# Patient Record
Sex: MALE | Race: WHITE | Employment: FULL TIME | URBAN - METROPOLITAN AREA
[De-identification: names, ages, dates, MRNs, and addresses within clinical notes are randomized per-mention and may not be internally consistent; named-entity substitution may affect disease eponyms.]

---

## 2017-01-06 ENCOUNTER — OFFICE VISIT (OUTPATIENT)
Dept: FAMILY MEDICINE CLINIC | Facility: CLINIC | Age: 52
End: 2017-01-06

## 2017-01-06 VITALS — DIASTOLIC BLOOD PRESSURE: 82 MMHG | RESPIRATION RATE: 16 BRPM | HEART RATE: 80 BPM | SYSTOLIC BLOOD PRESSURE: 132 MMHG

## 2017-01-06 DIAGNOSIS — E88.81 METABOLIC SYNDROME: Primary | ICD-10-CM

## 2017-01-06 DIAGNOSIS — Z72.0 TOBACCO ABUSE: ICD-10-CM

## 2017-01-06 DIAGNOSIS — E78.1 HYPERTRIGLYCERIDEMIA: ICD-10-CM

## 2017-01-06 PROCEDURE — 99213 OFFICE O/P EST LOW 20 MIN: CPT | Performed by: FAMILY MEDICINE

## 2017-01-06 RX ORDER — PRAVASTATIN SODIUM 10 MG
10 TABLET ORAL NIGHTLY
Qty: 90 TABLET | Refills: 3 | Status: SHIPPED | OUTPATIENT
Start: 2017-01-06 | End: 2017-06-07

## 2017-01-06 RX ORDER — FENOFIBRATE 160 MG/1
160 TABLET ORAL DAILY
Qty: 90 TABLET | Refills: 3 | Status: SHIPPED | OUTPATIENT
Start: 2017-01-06 | End: 2017-11-06

## 2017-01-06 RX ORDER — LEVOTHYROXINE SODIUM 0.03 MG/1
TABLET ORAL
Qty: 100 TABLET | Refills: 3 | Status: SHIPPED | OUTPATIENT
Start: 2017-01-06 | End: 2017-05-02 | Stop reason: DRUGHIGH

## 2017-01-06 NOTE — PROGRESS NOTES
Arrives to discuss some findings including a TSH over 10 and a triglyceride over 700. Thankfully he is asymptomatic for both these numbers although he has recently put on a fair amount of weight since he stopped smoking.     He denies chest pain shortness

## 2017-04-03 ENCOUNTER — LAB ENCOUNTER (OUTPATIENT)
Dept: LAB | Age: 52
End: 2017-04-03
Attending: FAMILY MEDICINE
Payer: COMMERCIAL

## 2017-04-03 DIAGNOSIS — E78.1 HYPERTRIGLYCERIDEMIA: ICD-10-CM

## 2017-04-03 DIAGNOSIS — E88.81 METABOLIC SYNDROME: ICD-10-CM

## 2017-04-03 PROCEDURE — 80061 LIPID PANEL: CPT

## 2017-04-03 PROCEDURE — 36415 COLL VENOUS BLD VENIPUNCTURE: CPT

## 2017-04-03 PROCEDURE — 83721 ASSAY OF BLOOD LIPOPROTEIN: CPT

## 2017-04-03 PROCEDURE — 84443 ASSAY THYROID STIM HORMONE: CPT

## 2017-04-03 PROCEDURE — 80053 COMPREHEN METABOLIC PANEL: CPT

## 2017-04-03 PROCEDURE — 83036 HEMOGLOBIN GLYCOSYLATED A1C: CPT

## 2017-04-11 ENCOUNTER — OFFICE VISIT (OUTPATIENT)
Dept: FAMILY MEDICINE CLINIC | Facility: CLINIC | Age: 52
End: 2017-04-11

## 2017-04-11 VITALS
HEART RATE: 66 BPM | BODY MASS INDEX: 35.55 KG/M2 | WEIGHT: 240 LBS | TEMPERATURE: 98 F | SYSTOLIC BLOOD PRESSURE: 144 MMHG | RESPIRATION RATE: 16 BRPM | HEIGHT: 69 IN | DIASTOLIC BLOOD PRESSURE: 82 MMHG

## 2017-04-11 DIAGNOSIS — K75.81 STEATOHEPATITIS: ICD-10-CM

## 2017-04-11 DIAGNOSIS — E78.1 HYPERTRIGLYCERIDEMIA: Primary | ICD-10-CM

## 2017-04-11 PROCEDURE — 99213 OFFICE O/P EST LOW 20 MIN: CPT | Performed by: FAMILY MEDICINE

## 2017-04-11 RX ORDER — PHENTERMINE HYDROCHLORIDE 37.5 MG/1
37.5 CAPSULE ORAL EVERY MORNING
Qty: 30 CAPSULE | Refills: 2 | Status: SHIPPED | OUTPATIENT
Start: 2017-04-11 | End: 2017-06-07

## 2017-04-11 RX ORDER — BUDESONIDE AND FORMOTEROL FUMARATE DIHYDRATE 160; 4.5 UG/1; UG/1
AEROSOL RESPIRATORY (INHALATION)
Qty: 1 INHALER | Refills: 6 | Status: SHIPPED | OUTPATIENT
Start: 2017-04-11 | End: 2017-07-17

## 2017-04-11 RX ORDER — ALBUTEROL SULFATE 90 UG/1
2 AEROSOL, METERED RESPIRATORY (INHALATION) EVERY 6 HOURS PRN
Qty: 1 INHALER | Refills: 6 | Status: SHIPPED | OUTPATIENT
Start: 2017-04-11 | End: 2017-11-06

## 2017-04-11 NOTE — PROGRESS NOTES
Here today to discuss his weight gain that has come on the heels of his losing weight intentionally. .  I also took advantage of our time today to discuss laboratory results including an AST of 48 and an ALT of 83. These represent asymptomatic elevations.

## 2017-05-02 ENCOUNTER — TELEPHONE (OUTPATIENT)
Dept: FAMILY MEDICINE CLINIC | Facility: CLINIC | Age: 52
End: 2017-05-02

## 2017-05-02 RX ORDER — LEVOTHYROXINE SODIUM 0.1 MG/1
100 TABLET ORAL
Qty: 90 TABLET | Refills: 1 | Status: SHIPPED | OUTPATIENT
Start: 2017-05-02 | End: 2017-10-30

## 2017-05-02 NOTE — TELEPHONE ENCOUNTER
Refilled medication- patient was taking Levothyroxine 25mcg 4 tabs a day. rx sent for Levothyroxine 100mcg 1 tab a day. Alejandra informed.

## 2017-05-02 NOTE — TELEPHONE ENCOUNTER
Wants to know why they only got 3 tabs on the Levothyroxine. He refilled at [facility] and this is all they gave him. Normally he gets 30 days supply. He needs a refill on this med for 1 month. Pls call to advise.

## 2017-06-05 ENCOUNTER — APPOINTMENT (OUTPATIENT)
Dept: LAB | Age: 52
End: 2017-06-05
Attending: FAMILY MEDICINE
Payer: COMMERCIAL

## 2017-06-05 DIAGNOSIS — K75.81 STEATOHEPATITIS: ICD-10-CM

## 2017-06-05 DIAGNOSIS — E78.1 HYPERTRIGLYCERIDEMIA: ICD-10-CM

## 2017-06-05 PROCEDURE — 83036 HEMOGLOBIN GLYCOSYLATED A1C: CPT

## 2017-06-05 PROCEDURE — 80061 LIPID PANEL: CPT

## 2017-06-05 PROCEDURE — 80053 COMPREHEN METABOLIC PANEL: CPT

## 2017-06-05 PROCEDURE — 36415 COLL VENOUS BLD VENIPUNCTURE: CPT

## 2017-06-07 ENCOUNTER — OFFICE VISIT (OUTPATIENT)
Dept: FAMILY MEDICINE CLINIC | Facility: CLINIC | Age: 52
End: 2017-06-07

## 2017-06-07 VITALS
SYSTOLIC BLOOD PRESSURE: 132 MMHG | WEIGHT: 228 LBS | TEMPERATURE: 98 F | BODY MASS INDEX: 33.77 KG/M2 | RESPIRATION RATE: 16 BRPM | DIASTOLIC BLOOD PRESSURE: 84 MMHG | HEART RATE: 76 BPM | HEIGHT: 69 IN

## 2017-06-07 DIAGNOSIS — T78.3XXA ANGIOEDEMA, INITIAL ENCOUNTER: ICD-10-CM

## 2017-06-07 DIAGNOSIS — E88.81 METABOLIC SYNDROME: Primary | ICD-10-CM

## 2017-06-07 DIAGNOSIS — E78.1 HYPERTRIGLYCERIDEMIA: ICD-10-CM

## 2017-06-07 PROCEDURE — 99213 OFFICE O/P EST LOW 20 MIN: CPT | Performed by: FAMILY MEDICINE

## 2017-06-07 RX ORDER — PRAVASTATIN SODIUM 20 MG
20 TABLET ORAL NIGHTLY
Qty: 90 TABLET | Refills: 3 | Status: SHIPPED | OUTPATIENT
Start: 2017-06-07 | End: 2017-11-06

## 2017-06-07 RX ORDER — MONTELUKAST SODIUM 10 MG/1
10 TABLET ORAL NIGHTLY
Qty: 90 TABLET | Refills: 3 | Status: SHIPPED | OUTPATIENT
Start: 2017-06-07 | End: 2017-11-06

## 2017-07-17 RX ORDER — BUDESONIDE AND FORMOTEROL FUMARATE DIHYDRATE 160; 4.5 UG/1; UG/1
AEROSOL RESPIRATORY (INHALATION)
Qty: 3 INHALER | Refills: 3 | Status: SHIPPED | OUTPATIENT
Start: 2017-07-17 | End: 2017-11-06

## 2017-10-30 ENCOUNTER — TELEPHONE (OUTPATIENT)
Dept: FAMILY MEDICINE CLINIC | Facility: CLINIC | Age: 52
End: 2017-10-30

## 2017-10-30 RX ORDER — LEVOTHYROXINE SODIUM 0.1 MG/1
100 TABLET ORAL
Qty: 90 TABLET | Refills: 0 | Status: SHIPPED | OUTPATIENT
Start: 2017-10-30 | End: 2017-11-06

## 2017-10-30 NOTE — TELEPHONE ENCOUNTER
rx refilled, pt has appt 11/13/17, however wife states pt's b/p has been high, and wants sooner appt, no symptoms.  Pt will get labs done, appt rescheduled for 11/6/17

## 2017-11-03 ENCOUNTER — LAB ENCOUNTER (OUTPATIENT)
Dept: LAB | Age: 52
End: 2017-11-03
Attending: FAMILY MEDICINE
Payer: COMMERCIAL

## 2017-11-03 DIAGNOSIS — T78.3XXA ANGIOEDEMA, INITIAL ENCOUNTER: ICD-10-CM

## 2017-11-03 DIAGNOSIS — E88.81 METABOLIC SYNDROME: ICD-10-CM

## 2017-11-03 DIAGNOSIS — E78.1 HYPERTRIGLYCERIDEMIA: ICD-10-CM

## 2017-11-03 PROCEDURE — 80061 LIPID PANEL: CPT

## 2017-11-03 PROCEDURE — 80053 COMPREHEN METABOLIC PANEL: CPT

## 2017-11-03 PROCEDURE — 36415 COLL VENOUS BLD VENIPUNCTURE: CPT

## 2017-11-03 PROCEDURE — 83721 ASSAY OF BLOOD LIPOPROTEIN: CPT

## 2017-11-06 ENCOUNTER — OFFICE VISIT (OUTPATIENT)
Dept: FAMILY MEDICINE CLINIC | Facility: CLINIC | Age: 52
End: 2017-11-06

## 2017-11-06 VITALS
OXYGEN SATURATION: 98 % | HEART RATE: 77 BPM | WEIGHT: 228 LBS | DIASTOLIC BLOOD PRESSURE: 90 MMHG | BODY MASS INDEX: 33.77 KG/M2 | SYSTOLIC BLOOD PRESSURE: 160 MMHG | TEMPERATURE: 98 F | HEIGHT: 69 IN | RESPIRATION RATE: 17 BRPM

## 2017-11-06 DIAGNOSIS — E78.1 HYPERTRIGLYCERIDEMIA: ICD-10-CM

## 2017-11-06 DIAGNOSIS — E88.81 METABOLIC SYNDROME: ICD-10-CM

## 2017-11-06 DIAGNOSIS — I10 HYPERTENSION, ACCELERATED: Primary | ICD-10-CM

## 2017-11-06 PROCEDURE — 99214 OFFICE O/P EST MOD 30 MIN: CPT | Performed by: FAMILY MEDICINE

## 2017-11-06 RX ORDER — FENOFIBRATE 160 MG/1
160 TABLET ORAL DAILY
Qty: 90 TABLET | Refills: 3 | Status: SHIPPED | OUTPATIENT
Start: 2017-11-06 | End: 2018-06-26

## 2017-11-06 RX ORDER — LEVOTHYROXINE SODIUM 0.1 MG/1
100 TABLET ORAL
Qty: 90 TABLET | Refills: 0 | Status: SHIPPED | OUTPATIENT
Start: 2017-11-06 | End: 2018-02-24

## 2017-11-06 RX ORDER — BUDESONIDE AND FORMOTEROL FUMARATE DIHYDRATE 160; 4.5 UG/1; UG/1
AEROSOL RESPIRATORY (INHALATION)
Qty: 3 INHALER | Refills: 3 | Status: SHIPPED | OUTPATIENT
Start: 2017-11-06 | End: 2018-06-26

## 2017-11-06 RX ORDER — PRAVASTATIN SODIUM 20 MG
20 TABLET ORAL NIGHTLY
Qty: 90 TABLET | Refills: 3 | Status: SHIPPED | OUTPATIENT
Start: 2017-11-06 | End: 2018-06-26

## 2017-11-06 RX ORDER — ALBUTEROL SULFATE 90 UG/1
2 AEROSOL, METERED RESPIRATORY (INHALATION) EVERY 6 HOURS PRN
Qty: 1 INHALER | Refills: 6 | Status: SHIPPED | OUTPATIENT
Start: 2017-11-06 | End: 2018-06-26

## 2017-11-06 RX ORDER — LOSARTAN POTASSIUM AND HYDROCHLOROTHIAZIDE 12.5; 5 MG/1; MG/1
1 TABLET ORAL DAILY
Qty: 90 TABLET | Refills: 3 | Status: SHIPPED | OUTPATIENT
Start: 2017-11-06 | End: 2018-06-26

## 2017-11-06 NOTE — PROGRESS NOTES
Patient is here for reevaluation of his chronic asthma. He is a very busy toe tropic . He occasionally has upper substernal burning is worse when he is active on the job or otherwise becomes excited about something. He is currently symptom-free.

## 2017-12-04 ENCOUNTER — TELEPHONE (OUTPATIENT)
Dept: FAMILY MEDICINE CLINIC | Facility: CLINIC | Age: 52
End: 2017-12-04

## 2017-12-04 NOTE — TELEPHONE ENCOUNTER
PT WIFE CALLED TO SET UP APPT BUT WOULD LIKE ELIU TO BE SEEN SOONER THAN 12/19/17    PT STATES BP STILL RUNNING HIGH     PLEASE CALL

## 2017-12-05 NOTE — TELEPHONE ENCOUNTER
Spoke to pt, he states his b/p has been 151/? He is not sure, he is taking his meds. He has no symptoms. appt time changed to 12/7/17.

## 2017-12-07 NOTE — PROGRESS NOTES
Follow-up for both blood pressure and his breathing. He had atypical chest sensations which have improved drastically now that he is using his Symbicort inhaler and as needed albuterol inhalers more regularly.   He had been taking montelukast for recurrent

## 2018-01-23 ENCOUNTER — APPOINTMENT (OUTPATIENT)
Dept: GENERAL RADIOLOGY | Facility: HOSPITAL | Age: 53
End: 2018-01-23
Attending: EMERGENCY MEDICINE
Payer: OTHER MISCELLANEOUS

## 2018-01-23 ENCOUNTER — HOSPITAL ENCOUNTER (EMERGENCY)
Facility: HOSPITAL | Age: 53
Discharge: HOME | End: 2018-01-23
Attending: EMERGENCY MEDICINE
Payer: OTHER MISCELLANEOUS

## 2018-01-23 VITALS
TEMPERATURE: 98 F | HEIGHT: 68 IN | SYSTOLIC BLOOD PRESSURE: 138 MMHG | OXYGEN SATURATION: 96 % | WEIGHT: 234 LBS | RESPIRATION RATE: 24 BRPM | HEART RATE: 72 BPM | DIASTOLIC BLOOD PRESSURE: 58 MMHG | BODY MASS INDEX: 35.46 KG/M2

## 2018-01-23 DIAGNOSIS — S93.492A SPRAIN OF ANTERIOR TALOFIBULAR LIGAMENT OF LEFT ANKLE, INITIAL ENCOUNTER: Primary | ICD-10-CM

## 2018-01-23 DIAGNOSIS — S63.502A SPRAIN OF LEFT WRIST, INITIAL ENCOUNTER: ICD-10-CM

## 2018-01-23 PROCEDURE — 73610 X-RAY EXAM OF ANKLE: CPT | Performed by: EMERGENCY MEDICINE

## 2018-01-23 PROCEDURE — 99284 EMERGENCY DEPT VISIT MOD MDM: CPT

## 2018-01-23 PROCEDURE — 73110 X-RAY EXAM OF WRIST: CPT | Performed by: EMERGENCY MEDICINE

## 2018-01-23 RX ORDER — IBUPROFEN 600 MG/1
600 TABLET ORAL EVERY 8 HOURS PRN
Qty: 30 TABLET | Refills: 0 | Status: SHIPPED | OUTPATIENT
Start: 2018-01-23 | End: 2018-01-30

## 2018-01-23 RX ORDER — IBUPROFEN 600 MG/1
600 TABLET ORAL ONCE
Status: DISCONTINUED | OUTPATIENT
Start: 2018-01-23 | End: 2018-01-23

## 2018-01-23 NOTE — ED NOTES
Spoke with Stacey Goodwin in [address] is not contracted with [facility] [address]. 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

## 2018-01-23 NOTE — ED PROVIDER NOTES
Patient Seen in: [facility] Emergency Department    History   Patient presents with:  Fall (musculoskeletal, neurologic)    Stated Complaint: ankle, wrist    HPI    69-year-old male who comes emergency department complaining of a fall.   He norma 1669  ------------------------------------------------------------  Xr Ankle (min 3 Views), Left (cpt=73610)    Result Date: 1/23/2018  CONCLUSION:  1. Soft tissue swelling laterally without definite acute displaced fracture appreciated.   Findings consiste

## 2018-01-24 ENCOUNTER — TELEPHONE (OUTPATIENT)
Dept: FAMILY MEDICINE CLINIC | Facility: CLINIC | Age: 53
End: 2018-01-24

## 2018-01-24 NOTE — TELEPHONE ENCOUNTER
Pt seen in ED on 1/23/18 for a sprained wrist and ankle. He was recommended to follow up in a week with JG if needed. Patient said the swelling is going down and the mobility is coming back. He said for now he didn't feel he needed a follow up.

## 2018-02-26 ENCOUNTER — TELEPHONE (OUTPATIENT)
Dept: FAMILY MEDICINE CLINIC | Facility: CLINIC | Age: 53
End: 2018-02-26

## 2018-02-26 RX ORDER — LEVOTHYROXINE SODIUM 0.1 MG/1
TABLET ORAL
Qty: 90 TABLET | Refills: 0 | Status: SHIPPED | OUTPATIENT
Start: 2018-02-26 | End: 2018-05-06

## 2018-02-26 NOTE — TELEPHONE ENCOUNTER
PT OUT OF LEVOTHYROXINE. WE DID GET A REQUEST. NEEDS REFILLED ASAP. CAN WE REFILL? PLS CALL TO ADVISE.     Kamryn Broussard ON FILE

## 2018-05-06 DIAGNOSIS — E03.9 HYPOTHYROIDISM, UNSPECIFIED TYPE: Primary | ICD-10-CM

## 2018-05-07 RX ORDER — LEVOTHYROXINE SODIUM 0.1 MG/1
TABLET ORAL
Qty: 30 TABLET | Refills: 0 | Status: SHIPPED | OUTPATIENT
Start: 2018-05-07 | End: 2018-06-26

## 2018-06-04 RX ORDER — LEVOTHYROXINE SODIUM 0.1 MG/1
TABLET ORAL
Qty: 90 TABLET | Refills: 0 | Status: SHIPPED | OUTPATIENT
Start: 2018-06-04 | End: 2018-08-28

## 2018-06-11 ENCOUNTER — TELEPHONE (OUTPATIENT)
Dept: FAMILY MEDICINE CLINIC | Facility: CLINIC | Age: 53
End: 2018-06-11

## 2018-06-11 DIAGNOSIS — E78.1 HYPERTRIGLYCERIDEMIA: Primary | ICD-10-CM

## 2018-06-11 DIAGNOSIS — R73.09 ELEVATED GLUCOSE: ICD-10-CM

## 2018-06-11 NOTE — TELEPHONE ENCOUNTER
Pt's wife called to request a call back to discuss pt's lab orders, she wants to know if pt is due for any other labs since pt has a hx of bp and cholesterol and thyroid. Pt's wife wants pt to do his labs prior to his appt next Friday.  Please call and advi

## 2018-06-26 ENCOUNTER — LAB ENCOUNTER (OUTPATIENT)
Dept: LAB | Age: 53
End: 2018-06-26
Attending: FAMILY MEDICINE
Payer: COMMERCIAL

## 2018-06-26 ENCOUNTER — OFFICE VISIT (OUTPATIENT)
Dept: FAMILY MEDICINE CLINIC | Facility: CLINIC | Age: 53
End: 2018-06-26

## 2018-06-26 VITALS
RESPIRATION RATE: 16 BRPM | BODY MASS INDEX: 34.71 KG/M2 | WEIGHT: 229 LBS | OXYGEN SATURATION: 98 % | HEIGHT: 68 IN | TEMPERATURE: 98 F | DIASTOLIC BLOOD PRESSURE: 78 MMHG | HEART RATE: 54 BPM | SYSTOLIC BLOOD PRESSURE: 116 MMHG

## 2018-06-26 DIAGNOSIS — E03.9 HYPOTHYROIDISM, UNSPECIFIED TYPE: ICD-10-CM

## 2018-06-26 DIAGNOSIS — E78.1 HYPERTRIGLYCERIDEMIA: ICD-10-CM

## 2018-06-26 DIAGNOSIS — E88.81 METABOLIC SYNDROME: ICD-10-CM

## 2018-06-26 DIAGNOSIS — J45.41 MODERATE PERSISTENT ASTHMA WITH ACUTE EXACERBATION: Primary | ICD-10-CM

## 2018-06-26 DIAGNOSIS — R73.09 ELEVATED GLUCOSE: ICD-10-CM

## 2018-06-26 PROCEDURE — 83036 HEMOGLOBIN GLYCOSYLATED A1C: CPT

## 2018-06-26 PROCEDURE — 80061 LIPID PANEL: CPT

## 2018-06-26 PROCEDURE — 99213 OFFICE O/P EST LOW 20 MIN: CPT | Performed by: FAMILY MEDICINE

## 2018-06-26 PROCEDURE — 85025 COMPLETE CBC W/AUTO DIFF WBC: CPT

## 2018-06-26 PROCEDURE — 80053 COMPREHEN METABOLIC PANEL: CPT

## 2018-06-26 PROCEDURE — 84443 ASSAY THYROID STIM HORMONE: CPT

## 2018-06-26 PROCEDURE — 36415 COLL VENOUS BLD VENIPUNCTURE: CPT

## 2018-06-26 RX ORDER — LOSARTAN POTASSIUM AND HYDROCHLOROTHIAZIDE 12.5; 5 MG/1; MG/1
1 TABLET ORAL DAILY
Qty: 90 TABLET | Refills: 3 | Status: ON HOLD | OUTPATIENT
Start: 2018-06-26 | End: 2018-09-01

## 2018-06-26 RX ORDER — BUDESONIDE AND FORMOTEROL FUMARATE DIHYDRATE 160; 4.5 UG/1; UG/1
AEROSOL RESPIRATORY (INHALATION)
Qty: 3 INHALER | Refills: 3 | Status: SHIPPED | OUTPATIENT
Start: 2018-06-26 | End: 2019-11-18

## 2018-06-26 RX ORDER — ALBUTEROL SULFATE 90 UG/1
2 AEROSOL, METERED RESPIRATORY (INHALATION) EVERY 6 HOURS PRN
Qty: 1 INHALER | Refills: 6 | Status: SHIPPED | OUTPATIENT
Start: 2018-06-26 | End: 2019-01-15

## 2018-06-26 RX ORDER — FENOFIBRATE 160 MG/1
160 TABLET ORAL DAILY
Qty: 90 TABLET | Refills: 3 | Status: SHIPPED | OUTPATIENT
Start: 2018-06-26 | End: 2019-01-21 | Stop reason: ALTCHOICE

## 2018-06-26 RX ORDER — ALBUTEROL SULFATE 90 UG/1
2 AEROSOL, METERED RESPIRATORY (INHALATION) EVERY 6 HOURS PRN
Qty: 3 INHALER | Refills: 3 | Status: SHIPPED | OUTPATIENT
Start: 2018-06-26 | End: 2018-11-06

## 2018-06-26 RX ORDER — PRAVASTATIN SODIUM 20 MG
20 TABLET ORAL NIGHTLY
Qty: 90 TABLET | Refills: 3 | Status: SHIPPED | OUTPATIENT
Start: 2018-06-26 | End: 2018-12-05

## 2018-06-26 NOTE — PROGRESS NOTES
Here for discussion and reevaluation of his asthma. He continues to smoke 2-3 small cigars a day and has stopped cigarettes. He is intentionally lost nearly 20 pounds by cutting out junk food and soda pop.     Today's exam vital signs are normal.  He has

## 2018-07-16 DIAGNOSIS — E03.9 HYPOTHYROIDISM, UNSPECIFIED TYPE: ICD-10-CM

## 2018-07-16 RX ORDER — LEVOTHYROXINE SODIUM 0.1 MG/1
TABLET ORAL
Qty: 30 TABLET | Refills: 0 | Status: SHIPPED | OUTPATIENT
Start: 2018-07-16 | End: 2018-08-17

## 2018-08-09 ENCOUNTER — TELEPHONE (OUTPATIENT)
Dept: FAMILY MEDICINE CLINIC | Facility: CLINIC | Age: 53
End: 2018-08-09

## 2018-08-09 ENCOUNTER — TELEPHONE (OUTPATIENT)
Dept: ADMINISTRATIVE | Age: 53
End: 2018-08-09

## 2018-08-09 NOTE — TELEPHONE ENCOUNTER
Pt's wife, Gonsalo Vitale, called to cancel/reschedule 's appointment with Dr. Elizabeth Enriquez 8/10/18. Gonsalo Vitale would prefer the office call her back to re-schedule, to be sure the appointment will be kept.  Please return Alejandra's call at [phone] to re-schedule

## 2018-08-17 DIAGNOSIS — E03.9 HYPOTHYROIDISM, UNSPECIFIED TYPE: ICD-10-CM

## 2018-08-17 RX ORDER — LEVOTHYROXINE SODIUM 0.1 MG/1
TABLET ORAL
Qty: 90 TABLET | Refills: 0 | Status: SHIPPED | OUTPATIENT
Start: 2018-08-17 | End: 2018-11-25

## 2018-08-28 ENCOUNTER — OFFICE VISIT (OUTPATIENT)
Dept: FAMILY MEDICINE CLINIC | Facility: CLINIC | Age: 53
End: 2018-08-28
Payer: COMMERCIAL

## 2018-08-28 ENCOUNTER — HOSPITAL ENCOUNTER (OUTPATIENT)
Dept: CT IMAGING | Age: 53
Discharge: HOME | End: 2018-08-28
Attending: FAMILY MEDICINE
Payer: COMMERCIAL

## 2018-08-28 ENCOUNTER — HOSPITAL ENCOUNTER (INPATIENT)
Facility: HOSPITAL | Age: 53
LOS: 3 days | Discharge: HOME | DRG: 372 | End: 2018-09-01
Attending: EMERGENCY MEDICINE | Admitting: HOSPITALIST
Payer: COMMERCIAL

## 2018-08-28 VITALS
RESPIRATION RATE: 16 BRPM | HEART RATE: 72 BPM | BODY MASS INDEX: 33.34 KG/M2 | WEIGHT: 220 LBS | OXYGEN SATURATION: 98 % | HEIGHT: 68 IN | TEMPERATURE: 98 F | SYSTOLIC BLOOD PRESSURE: 122 MMHG | DIASTOLIC BLOOD PRESSURE: 74 MMHG

## 2018-08-28 DIAGNOSIS — K35.80 ACUTE APPENDICITIS, UNSPECIFIED ACUTE APPENDICITIS TYPE: Primary | ICD-10-CM

## 2018-08-28 DIAGNOSIS — R10.31 ABDOMINAL PAIN, ACUTE, BILATERAL LOWER QUADRANT: Primary | ICD-10-CM

## 2018-08-28 DIAGNOSIS — R10.31 RLQ ABDOMINAL PAIN: ICD-10-CM

## 2018-08-28 DIAGNOSIS — R10.32 ABDOMINAL PAIN, ACUTE, BILATERAL LOWER QUADRANT: Primary | ICD-10-CM

## 2018-08-28 LAB
ALBUMIN SERPL-MCNC: 3.2 G/DL (ref 3.5–4.8)
ALBUMIN/GLOB SERPL: 0.7 {RATIO} (ref 1–2)
ALP LIVER SERPL-CCNC: 92 U/L (ref 45–117)
ALT SERPL-CCNC: 124 U/L (ref 17–63)
ANION GAP SERPL CALC-SCNC: 12 MMOL/L (ref 0–18)
AST SERPL-CCNC: 66 U/L (ref 15–41)
BASOPHILS # BLD AUTO: 0.04 X10(3) UL (ref 0–0.1)
BASOPHILS NFR BLD AUTO: 0.3 %
BILIRUB SERPL-MCNC: 0.3 MG/DL (ref 0.1–2)
BUN BLD-MCNC: 38 MG/DL (ref 8–20)
BUN/CREAT SERPL: 13.6 (ref 10–20)
CALCIUM BLD-MCNC: 9.2 MG/DL (ref 8.3–10.3)
CHLORIDE SERPL-SCNC: 108 MMOL/L (ref 101–111)
CO2 SERPL-SCNC: 17 MMOL/L (ref 22–32)
CREAT BLD-MCNC: 2.8 MG/DL (ref 0.7–1.3)
EOSINOPHIL # BLD AUTO: 0.21 X10(3) UL (ref 0–0.3)
EOSINOPHIL NFR BLD AUTO: 1.8 %
ERYTHROCYTE [DISTWIDTH] IN BLOOD BY AUTOMATED COUNT: 14.6 % (ref 11.5–16)
GLOBULIN PLAS-MCNC: 4.8 G/DL (ref 2.5–4)
GLUCOSE BLD-MCNC: 97 MG/DL (ref 70–99)
HCT VFR BLD AUTO: 34 % (ref 37–53)
HGB BLD-MCNC: 11.4 G/DL (ref 13–17)
IMMATURE GRANULOCYTE COUNT: 0.05 X10(3) UL (ref 0–1)
IMMATURE GRANULOCYTE RATIO %: 0.4 %
LYMPHOCYTES # BLD AUTO: 1.32 X10(3) UL (ref 0.9–4)
LYMPHOCYTES NFR BLD AUTO: 11.1 %
M PROTEIN MFR SERPL ELPH: 8 G/DL (ref 6.1–8.3)
MCH RBC QN AUTO: 28.7 PG (ref 27–33.2)
MCHC RBC AUTO-ENTMCNC: 33.5 G/DL (ref 31–37)
MCV RBC AUTO: 85.6 FL (ref 80–99)
MONOCYTES # BLD AUTO: 1.3 X10(3) UL (ref 0.1–1)
MONOCYTES NFR BLD AUTO: 11 %
NEUTROPHIL ABS PRELIM: 8.92 X10 (3) UL (ref 1.3–6.7)
NEUTROPHILS # BLD AUTO: 8.92 X10(3) UL (ref 1.3–6.7)
NEUTROPHILS NFR BLD AUTO: 75.4 %
OSMOLALITY SERPL CALC.SUM OF ELEC: 293 MOSM/KG (ref 275–295)
PLATELET # BLD AUTO: 267 10(3)UL (ref 150–450)
POTASSIUM SERPL-SCNC: 4.1 MMOL/L (ref 3.6–5.1)
RBC # BLD AUTO: 3.97 X10(6)UL (ref 4.3–5.7)
RED CELL DISTRIBUTION WIDTH-SD: 45.6 FL (ref 35.1–46.3)
SODIUM SERPL-SCNC: 137 MMOL/L (ref 136–144)
WBC # BLD AUTO: 11.8 X10(3) UL (ref 4–13)

## 2018-08-28 PROCEDURE — 99214 OFFICE O/P EST MOD 30 MIN: CPT | Performed by: FAMILY MEDICINE

## 2018-08-28 PROCEDURE — 74176 CT ABD & PELVIS W/O CONTRAST: CPT | Performed by: FAMILY MEDICINE

## 2018-08-28 RX ORDER — HYDROCODONE BITARTRATE AND ACETAMINOPHEN 10; 325 MG/1; MG/1
1 TABLET ORAL EVERY 6 HOURS PRN
Qty: 30 TABLET | Refills: 0 | Status: SHIPPED | OUTPATIENT
Start: 2018-08-28 | End: 2018-09-11 | Stop reason: ALTCHOICE

## 2018-08-28 NOTE — PROGRESS NOTES
Presents for 2 problems problem #1 is severe burning pain just below the right inguinal crease he denies trauma. He has been taking up to 8-10 ibuprofen tablets daily with no improvement he does not feel constipated is not nauseated he is not vomiting.   H

## 2018-08-29 ENCOUNTER — APPOINTMENT (OUTPATIENT)
Dept: CT IMAGING | Facility: HOSPITAL | Age: 53
DRG: 372 | End: 2018-08-29
Attending: PHYSICIAN ASSISTANT
Payer: COMMERCIAL

## 2018-08-29 PROBLEM — K35.80 ACUTE APPENDICITIS, UNSPECIFIED ACUTE APPENDICITIS TYPE: Status: ACTIVE | Noted: 2018-08-29

## 2018-08-29 LAB
ANION GAP SERPL CALC-SCNC: 12 MMOL/L (ref 0–18)
ANTIBODY SCREEN: NEGATIVE
APAP SERPL-MCNC: 4.7 UG/ML (ref ?–2)
APTT PPP: 37.7 SECONDS (ref 26.1–34.6)
BASOPHILS # BLD AUTO: 0.03 X10(3) UL (ref 0–0.1)
BASOPHILS NFR BLD AUTO: 0.3 %
BILIRUB UR QL STRIP.AUTO: NEGATIVE
BUN BLD-MCNC: 35 MG/DL (ref 8–20)
BUN/CREAT SERPL: 13.8 (ref 10–20)
CALCIUM BLD-MCNC: 8.7 MG/DL (ref 8.3–10.3)
CHLORIDE SERPL-SCNC: 108 MMOL/L (ref 101–111)
CLARITY UR REFRACT.AUTO: CLEAR
CO2 SERPL-SCNC: 19 MMOL/L (ref 22–32)
COLOR UR AUTO: YELLOW
CREAT BLD-MCNC: 2.53 MG/DL (ref 0.7–1.3)
EOSINOPHIL # BLD AUTO: 0.17 X10(3) UL (ref 0–0.3)
EOSINOPHIL NFR BLD AUTO: 1.8 %
ERYTHROCYTE [DISTWIDTH] IN BLOOD BY AUTOMATED COUNT: 14.7 % (ref 11.5–16)
GLUCOSE BLD-MCNC: 96 MG/DL (ref 70–99)
GLUCOSE UR STRIP.AUTO-MCNC: NEGATIVE MG/DL
HCT VFR BLD AUTO: 33.5 % (ref 37–53)
HGB BLD-MCNC: 10.7 G/DL (ref 13–17)
IMMATURE GRANULOCYTE COUNT: 0.05 X10(3) UL (ref 0–1)
IMMATURE GRANULOCYTE RATIO %: 0.5 %
INR BLD: 1.11 (ref 0.9–1.1)
KETONES UR STRIP.AUTO-MCNC: NEGATIVE MG/DL
LEUKOCYTE ESTERASE UR QL STRIP.AUTO: NEGATIVE
LYMPHOCYTES # BLD AUTO: 0.91 X10(3) UL (ref 0.9–4)
LYMPHOCYTES NFR BLD AUTO: 9.6 %
MCH RBC QN AUTO: 27.9 PG (ref 27–33.2)
MCHC RBC AUTO-ENTMCNC: 31.9 G/DL (ref 31–37)
MCV RBC AUTO: 87.5 FL (ref 80–99)
MONOCYTES # BLD AUTO: 0.9 X10(3) UL (ref 0.1–1)
MONOCYTES NFR BLD AUTO: 9.5 %
NEUTROPHIL ABS PRELIM: 7.43 X10 (3) UL (ref 1.3–6.7)
NEUTROPHILS # BLD AUTO: 7.43 X10(3) UL (ref 1.3–6.7)
NEUTROPHILS NFR BLD AUTO: 78.3 %
NITRITE UR QL STRIP.AUTO: NEGATIVE
OSMOLALITY SERPL CALC.SUM OF ELEC: 296 MOSM/KG (ref 275–295)
PH UR STRIP.AUTO: 5 [PH] (ref 4.5–8)
PLATELET # BLD AUTO: 226 10(3)UL (ref 150–450)
POTASSIUM SERPL-SCNC: 4 MMOL/L (ref 3.6–5.1)
PROT UR STRIP.AUTO-MCNC: 30 MG/DL
PSA SERPL DL<=0.01 NG/ML-MCNC: 14.8 SECONDS (ref 12.4–14.7)
RBC # BLD AUTO: 3.83 X10(6)UL (ref 4.3–5.7)
RED CELL DISTRIBUTION WIDTH-SD: 47 FL (ref 35.1–46.3)
RH BLOOD TYPE: POSITIVE
SODIUM SERPL-SCNC: 139 MMOL/L (ref 136–144)
SP GR UR STRIP.AUTO: 1.01 (ref 1–1.03)
UROBILINOGEN UR STRIP.AUTO-MCNC: <2 MG/DL
WBC # BLD AUTO: 9.5 X10(3) UL (ref 4–13)
WBC CLUMPS UR QL AUTO: PRESENT

## 2018-08-29 PROCEDURE — 0W9G30Z DRAINAGE OF PERITONEAL CAVITY WITH DRAINAGE DEVICE, PERCUTANEOUS APPROACH: ICD-10-PCS | Performed by: RADIOLOGY

## 2018-08-29 PROCEDURE — 99152 MOD SED SAME PHYS/QHP 5/>YRS: CPT | Performed by: PHYSICIAN ASSISTANT

## 2018-08-29 PROCEDURE — 10030 IMG GID FLU COLL DRG SFT TIS: CPT | Performed by: PHYSICIAN ASSISTANT

## 2018-08-29 PROCEDURE — 49406 IMAGE CATH FLUID PERI/RETRO: CPT | Performed by: PHYSICIAN ASSISTANT

## 2018-08-29 PROCEDURE — 99253 IP/OBS CNSLTJ NEW/EST LOW 45: CPT | Performed by: SURGERY

## 2018-08-29 PROCEDURE — 99223 1ST HOSP IP/OBS HIGH 75: CPT | Performed by: HOSPITALIST

## 2018-08-29 RX ORDER — HEPARIN SODIUM 5000 [USP'U]/ML
5000 INJECTION, SOLUTION INTRAVENOUS; SUBCUTANEOUS ONCE
Status: DISCONTINUED | OUTPATIENT
Start: 2018-08-29 | End: 2018-08-29

## 2018-08-29 RX ORDER — SODIUM CHLORIDE 9 MG/ML
INJECTION, SOLUTION INTRAVENOUS CONTINUOUS
Status: DISCONTINUED | OUTPATIENT
Start: 2018-08-29 | End: 2018-09-01

## 2018-08-29 RX ORDER — MIDAZOLAM HYDROCHLORIDE 1 MG/ML
1 INJECTION INTRAMUSCULAR; INTRAVENOUS EVERY 5 MIN PRN
Status: ACTIVE | OUTPATIENT
Start: 2018-08-29 | End: 2018-08-29

## 2018-08-29 RX ORDER — FEXOFENADINE HCL 180 MG/1
180 TABLET ORAL DAILY
Status: ON HOLD | COMMUNITY
End: 2018-11-12

## 2018-08-29 RX ORDER — MIDAZOLAM HYDROCHLORIDE 1 MG/ML
INJECTION INTRAMUSCULAR; INTRAVENOUS
Status: COMPLETED
Start: 2018-08-29 | End: 2018-08-29

## 2018-08-29 RX ORDER — ONDANSETRON 2 MG/ML
4 INJECTION INTRAMUSCULAR; INTRAVENOUS EVERY 6 HOURS PRN
Status: DISCONTINUED | OUTPATIENT
Start: 2018-08-29 | End: 2018-09-01

## 2018-08-29 RX ORDER — FLUMAZENIL 0.1 MG/ML
0.2 INJECTION, SOLUTION INTRAVENOUS AS NEEDED
Status: ACTIVE | OUTPATIENT
Start: 2018-08-29 | End: 2018-08-29

## 2018-08-29 RX ORDER — MORPHINE SULFATE 4 MG/ML
2 INJECTION, SOLUTION INTRAMUSCULAR; INTRAVENOUS EVERY 2 HOUR PRN
Status: DISCONTINUED | OUTPATIENT
Start: 2018-08-29 | End: 2018-09-01

## 2018-08-29 RX ORDER — NALOXONE HYDROCHLORIDE 0.4 MG/ML
80 INJECTION, SOLUTION INTRAMUSCULAR; INTRAVENOUS; SUBCUTANEOUS AS NEEDED
Status: ACTIVE | OUTPATIENT
Start: 2018-08-29 | End: 2018-08-29

## 2018-08-29 RX ORDER — HYDRALAZINE HYDROCHLORIDE 20 MG/ML
10 INJECTION INTRAMUSCULAR; INTRAVENOUS EVERY 6 HOURS PRN
Status: DISCONTINUED | OUTPATIENT
Start: 2018-08-29 | End: 2018-09-01

## 2018-08-29 RX ORDER — MORPHINE SULFATE 4 MG/ML
4 INJECTION, SOLUTION INTRAMUSCULAR; INTRAVENOUS EVERY 2 HOUR PRN
Status: DISCONTINUED | OUTPATIENT
Start: 2018-08-29 | End: 2018-09-01

## 2018-08-29 RX ORDER — METOCLOPRAMIDE HYDROCHLORIDE 5 MG/ML
5 INJECTION INTRAMUSCULAR; INTRAVENOUS EVERY 8 HOURS PRN
Status: DISCONTINUED | OUTPATIENT
Start: 2018-08-29 | End: 2018-09-01

## 2018-08-29 RX ORDER — SODIUM CHLORIDE 9 MG/ML
INJECTION, SOLUTION INTRAVENOUS CONTINUOUS
Status: DISCONTINUED | OUTPATIENT
Start: 2018-08-29 | End: 2018-08-31

## 2018-08-29 RX ORDER — ALBUTEROL SULFATE 90 UG/1
2 AEROSOL, METERED RESPIRATORY (INHALATION) EVERY 6 HOURS PRN
Status: DISCONTINUED | OUTPATIENT
Start: 2018-08-29 | End: 2018-09-01

## 2018-08-29 RX ORDER — MORPHINE SULFATE 4 MG/ML
1 INJECTION, SOLUTION INTRAMUSCULAR; INTRAVENOUS EVERY 2 HOUR PRN
Status: DISCONTINUED | OUTPATIENT
Start: 2018-08-29 | End: 2018-09-01

## 2018-08-29 NOTE — IMAGING NOTE
Dr Flo Wills explained procedure to patient. Patient signed consent. Time out performed. Patient tolerated procedure well. Dr Flo Wills placed an indwelling  Right  abdominal drainage catheter attached to ALONSO drain. Initial output 40 ml abscess.  Dakota brittany

## 2018-08-29 NOTE — PRE-SEDATION ASSESSMENT
[facility]  IR Pre-Procedure Sedation Assessment    History of snoring or sleep or apnea?    Yes    History of previous problems with anesthesia or sedation  No    Physical Findings:  Neck: nl ROM    PULM: normal respiratory rate/effort    Mallampati S

## 2018-08-29 NOTE — PROCEDURES
[address] Patient Status:  Inpatient    1965 MRN [id number]   [facility] 3NW-A Attending Dixie Johnson MD   Hosp Day # 0 PCP Aquilino Orozco DO         Brief Procedure Report    Pre-Operative Diagnosis: A

## 2018-08-29 NOTE — PROGRESS NOTES
Pharmacy Note: Renal dose adjustment for Metoclopramide (Reglan)  Livier Reynolds has been prescribed Metoclopramide (Reglan) 10 mg every 8 hours as needed. Estimated Creatinine Clearance: 28.8 mL/min (A) (based on SCr of 2.8 mg/dL (H)).     His calcu

## 2018-08-29 NOTE — ED INITIAL ASSESSMENT (HPI)
Pt presents to the ED accompanied by family with complaints of right LQ abdominal pain for past 2 weeks. Pt was seen by his PMD earlier today, sent for a CT and then told to come to the ED due to abnormal result.  Pt awake and alert,skin w/d,resps reg/unlab

## 2018-08-29 NOTE — PROGRESS NOTES
Pt. returned from CT with right lower abdomen ALONSO placement. sorbaview dressing and site intact. 10CC pink-tinged tan drainage emptied from ALONSO. Drain flushed with 10cc NS and 10cc pink-tinged drainage aspirated. Repeated x1 due to amount of drainage.

## 2018-08-29 NOTE — PROGRESS NOTES
Pharmacy Note: Renal dose adjustment of Unasyn    Corazon Guajardo is a 52year old male who has been prescribed Unasyn. CrCl is 28.8 ml/min so the dose has been adjusted  to 3gm IV q12h per hospital renal dose adjustment protocol.   Pharmacy will follow

## 2018-08-29 NOTE — PAYOR COMM NOTE
--------------  Admit to inpatient Once (Order #[id number]) on 8/29/18    ADMISSION REVIEW     Payor: [address] [place] [address] Drive #:  [id number]  Authorization Number: [id number]    Admit date: 8/29/18  Admit time: 0030       Patient Seen in: [facility] measuring up to 2.6 cm in transverse dimension with adjacent inflammatory changes consistent with acute appendicitis.   Lateral to the distended appendix is an approximately 3.0 x 3.3 cm, AP x T respectively air-fluid level concerning for a periappendiceal movement was yesterday morning. He has been taking over-the-counter pain medications to help with his pain. Over the past 4 days he is taking approximately 20-25 tablets of 200 mg ibuprofen a day.     Current Outpatient Prescriptions   HYDROcodone-acetami presented to the ED for evaluation due to severe pain.      CT abdomen/pelvis shows severe inflammation of the appendix with perforation and abscess. White count is normal but with a left shift. There is also anemia noted to 10. 7.        Acute appendicitis

## 2018-08-29 NOTE — ED NOTES
Report received from [facility], introduced self to pt. Pt aware awaiting disposition to OR. Iv antibiotics started as ordered, pt vs rechecked pt's  ATX drawn and sent as ordered. Pt voiced no needs currently.

## 2018-08-29 NOTE — PROCEDURES
[facility]  Pre-Procedure Note    Name: Yuniel Self  MRN#: [id number]  : 1965    Procedure:  CT guided abscess drain placement    Indication: Abdominal Abscess. Perforated appendicitis.     Allergies:    No Known Allergies    Pertinent M

## 2018-08-29 NOTE — H&P
[facility]  History and Physical     [address] [facility] Patient Status:  Inpatient    1965 MRN [id number]   [facility] 3NW-A Attending Anup Rosado MD   Hosp Day # 0 PCP Steve Samayoa DO     Chief Complaint: Abdominal p Lipids Maternal Grandfather        Allergies: No Known Allergies    Medications:    No current facility-administered medications on file prior to encounter.    Current Outpatient Prescriptions on File Prior to Encounter:  HYDROcodone-acetaminophen ([address]) 1 No tenderness or deformity. Abdomen: Soft, mild RLQ tenderness, nondistended. Positive bowel sounds. No rebound, guarding or organomegaly. Neurologic: No focal neurological deficits. CNII-XII grossly intact. Musculoskeletal: Moves all extremities.   Ext

## 2018-08-29 NOTE — ED PROVIDER NOTES
Patient Seen in: [facility] Emergency Department    History   Patient presents with:  Abdomen/Flank Pain (GI/)    Stated Complaint: abdominal pain    HPI    Patient is a 58-year-old male comes emergency room for evaluation of abdominal pain.   Nabila (36.7 °C) (Oral)   Resp 16   Ht 172.7 cm (5' 8\")   Wt 98.9 kg   SpO2 96%   BMI 33.15 kg/m²         Physical Exam    GENERAL: No acute distress, well appearing and non-toxic, Alert and oriented X 3   HEENT: Normocephalic, atraumatic.   Moist mucous membrane ---------                               -----------         ------                     CBC W/ DIFFERENTIAL[[id number]]          Abnormal            Final result                 Please view results for these tests on the individual orders.    BASIC METABOLI incompletely characterized on this study but most likely represents a cyst.  ADRENALS:  No mass or enlargement. AORTA/VASCULAR:    Atherosclerosis. Infrarenal abdominal aortic aneurysm measures 3.0 x 3.2 cm, AP x T dimension respectively.  RETROPERITONEUM ondansetron HCl (ZOFRAN) injection 4 mg (not administered)   Metoclopramide HCl (REGLAN) injection 10 mg (not administered)   morphINE sulfate (PF) 4 MG/ML injection 1 mg (not administered)     Or   morphINE sulfate (PF) 4 MG/ML injection 2 mg (not admin

## 2018-08-30 PROCEDURE — 99233 SBSQ HOSP IP/OBS HIGH 50: CPT | Performed by: HOSPITALIST

## 2018-08-30 PROCEDURE — 99233 SBSQ HOSP IP/OBS HIGH 50: CPT | Performed by: SURGERY

## 2018-08-30 RX ORDER — HYDROCODONE BITARTRATE AND ACETAMINOPHEN 5; 325 MG/1; MG/1
1 TABLET ORAL EVERY 6 HOURS PRN
Status: DISCONTINUED | OUTPATIENT
Start: 2018-08-30 | End: 2018-08-31

## 2018-08-30 RX ORDER — DOCUSATE SODIUM 100 MG/1
100 CAPSULE, LIQUID FILLED ORAL 2 TIMES DAILY
Status: DISCONTINUED | OUTPATIENT
Start: 2018-08-30 | End: 2018-09-01

## 2018-08-30 RX ORDER — CETIRIZINE HYDROCHLORIDE 10 MG/1
10 TABLET ORAL DAILY
Status: DISCONTINUED | OUTPATIENT
Start: 2018-08-30 | End: 2018-09-01

## 2018-08-30 RX ORDER — HEPARIN SODIUM 5000 [USP'U]/ML
5000 INJECTION, SOLUTION INTRAVENOUS; SUBCUTANEOUS EVERY 8 HOURS SCHEDULED
Status: DISCONTINUED | OUTPATIENT
Start: 2018-08-30 | End: 2018-09-01

## 2018-08-30 NOTE — PROGRESS NOTES
[facility]  Progress Note    [place] Sl[address] Jeronimo Patient Status:  Inpatient    1965 MRN [id number]   [facility] 3NW-A Attending Jace Perrin MD   [facility] Day # 1 PCP Dale Dias DO     Subjective:  No new complaints.  Mild/mode total face time with this patient was 23 minutes. Greater than half of our visit was spent in counseling the patient on the above listed diagnoses and treatment options. Yulissa Kearney  8/30/2018  1:26 PM        The patient was seen and examined.   Kristal

## 2018-08-30 NOTE — PROGRESS NOTES
Drain to right side of abdomen flushed at this time with saline and 10ml of serosanguinous fluid aspirated without difficulty. Pt tolerated well. Rates pain to abdomen as a 4 on a 1-10 scale and denies need for pain medication at this time.  Pt resting in b

## 2018-08-30 NOTE — PROGRESS NOTES
[facility]  Progress Note     Gaurav Decker Patient Status:  Inpatient    1965 MRN [id number]   [facility] 3NW-A Attending Yuniel Vidal MD   Hosp Day # 1 PCP Tom Santacruz DO     Chief Complaint: abd pain    S: Patie Intravenous Q12H       ASSESSMENT / PLAN:     1. Acute appendicitis with charan-appendiceal abscess s/p IR drain placement POD#1; timing of definitive surgery TBD  2. ASUNCION-aggressive hydration-monitor gfr  3. Acidosis-monitor response to hydration  4.  Anemia-

## 2018-08-30 NOTE — PROGRESS NOTES
Drain to right side of abdomen flushed with 10ml of sterile saline and 10ml of serous fluid drained without difficulty. Dressing c/d/i. Pt tolerated well. Will continue to monitor.

## 2018-08-31 LAB
ANION GAP SERPL CALC-SCNC: 7 MMOL/L (ref 0–18)
BUN BLD-MCNC: 17 MG/DL (ref 8–20)
BUN/CREAT SERPL: 15.2 (ref 10–20)
CALCIUM BLD-MCNC: 7.9 MG/DL (ref 8.3–10.3)
CHLORIDE SERPL-SCNC: 111 MMOL/L (ref 101–111)
CO2 SERPL-SCNC: 25 MMOL/L (ref 22–32)
CREAT BLD-MCNC: 1.12 MG/DL (ref 0.7–1.3)
GLUCOSE BLD-MCNC: 93 MG/DL (ref 70–99)
OSMOLALITY SERPL CALC.SUM OF ELEC: 297 MOSM/KG (ref 275–295)
POTASSIUM SERPL-SCNC: 3.9 MMOL/L (ref 3.6–5.1)
SODIUM SERPL-SCNC: 143 MMOL/L (ref 136–144)

## 2018-08-31 PROCEDURE — 99232 SBSQ HOSP IP/OBS MODERATE 35: CPT | Performed by: HOSPITALIST

## 2018-08-31 PROCEDURE — 99233 SBSQ HOSP IP/OBS HIGH 50: CPT | Performed by: SURGERY

## 2018-08-31 RX ORDER — HYDROCODONE BITARTRATE AND ACETAMINOPHEN 5; 325 MG/1; MG/1
2 TABLET ORAL EVERY 4 HOURS PRN
Status: DISCONTINUED | OUTPATIENT
Start: 2018-08-31 | End: 2018-09-01

## 2018-08-31 RX ORDER — HYDROCODONE BITARTRATE AND ACETAMINOPHEN 5; 325 MG/1; MG/1
1 TABLET ORAL EVERY 4 HOURS PRN
Status: DISCONTINUED | OUTPATIENT
Start: 2018-08-31 | End: 2018-09-01

## 2018-08-31 RX ORDER — METRONIDAZOLE 500 MG/100ML
500 INJECTION, SOLUTION INTRAVENOUS EVERY 8 HOURS
Status: DISCONTINUED | OUTPATIENT
Start: 2018-08-31 | End: 2018-09-01

## 2018-08-31 RX ORDER — CIPROFLOXACIN 2 MG/ML
400 INJECTION, SOLUTION INTRAVENOUS EVERY 12 HOURS
Status: DISCONTINUED | OUTPATIENT
Start: 2018-08-31 | End: 2018-09-01

## 2018-08-31 NOTE — PLAN OF CARE
RLQ drain easily flushed with 10 ml normal saline, aspirated back 10 ml cloudy serous fluid without difficulty. Patient tolerated well. Dressing remains clean, dry, and intact, drain insertion site remains intact without redness, swelling, or drainage.   Pa

## 2018-08-31 NOTE — PLAN OF CARE
Problem: Patient/Family Goals  Goal: Patient/Family Long Term Goal  Patient's Long Term Goal: to go home    Interventions:  - npo, pain meds prn, d/c when ordered  - See additional Care Plan goals for specific interventions    Outcome: Progressing  matt die nutritional consult as needed  - Establish a toileting routine/schedule  - Consider collaborating with pharmacy to review patient's medication profile   Outcome: Progressing    Goal: Maintains adequate nutritional intake (undernourished)  INTERVENTIONS:  - coughing and deep breathing, patient gave good return demonstration and verbalizes understanding. Encourage patient to use incentive spirometer at least ten times/hour while awake. Encourage patient to use pillow as splint with coughing and deep breathing.

## 2018-08-31 NOTE — PROGRESS NOTES
[facility]  Progress Note      Gaurav Decker Patient Status:  Inpatient    1965 MRN [id number]   [facility] 3NW-A Attending Yuniel Vidal MD   [facility] Day # 2 PCP Tamra Way DO       Subjective:   Feels fine today    Obj

## 2018-08-31 NOTE — PROGRESS NOTES
[facility]  Progress Note    [address] [facility] Patient Status:  Inpatient    1965 MRN [id number]   [facility] 3NW-A Attending Jasbir Navas MD   [facility] Day # 2 PCP Sidra Acevedo DO     Subjective:  Patient feeling better.  Pain Dr. Satnam Srivastava today. If no surgery this admission, can be discharged in the next 24 hours and schedule laparoscopic appendectomy early next week. 2. Continue drain care  3. Continue diet as tolerated  4. Continue IV antibiotics  5.  IV fluids, analgesia/antie

## 2018-08-31 NOTE — PROGRESS NOTES
[facility]  Progress Note     Adiran Tinsley [address] Patient Status:  Inpatient    1965 MRN [id number]   [facility] 3NW-A Attending Brian Mars MD   Hosp Day # 2 PCP Agusto Friday, DO     Chief Complaint: abd pain    S: Patie Heparin Sodium (Porcine)  5,000 Units Subcutaneous [facility]   • docusate sodium  100 mg Oral BID   • cetirizine  10 mg Oral Daily   • Fluticasone Furoate-Vilanterol  1 puff Inhalation Daily   • ampicillin-sulbactam  3 g Intravenous Q12H       ASSESSMENT / P

## 2018-08-31 NOTE — PAYOR COMM NOTE
--------------  CONTINUED STAY REVIEW    Payor: [address] [place] [address] Drive #:  [id number]  Authorization Number: [id number]    Admit date: 8/29/18  Admit time: 0030 8/29:    Pre-Operative Diagnosis: Abdominal abscess requiring drainage.   Procedure Pe 08/29/18 2334 98.2 °F (36.8 °C) 68 18 129/70 93 %   08/29/18 2032 98.4 °F (36.9 °C) 70 18 133/54 93 %       8/31:    Aerobic Bacterial Culture [[id number]] (Abnormal)  Collected: 08/29/18 1356   Order Status: Completed Lab Status: Preliminary result Updated HYDROcodone-acetaminophen (NORCO) 5-325 MG per tab 2 tablet     Date Action Dose Route User    8/31/2018 7787 Given 2 tablet Oral Parish Herrera RN    8/31/2018 0158 Given 2 tablet Oral Geo Villaseñor RN      morphINE sulfate (PF) 4 MG/ML injectio

## 2018-09-01 VITALS
WEIGHT: 218 LBS | OXYGEN SATURATION: 96 % | SYSTOLIC BLOOD PRESSURE: 136 MMHG | HEART RATE: 52 BPM | BODY MASS INDEX: 33.04 KG/M2 | TEMPERATURE: 98 F | HEIGHT: 68 IN | DIASTOLIC BLOOD PRESSURE: 65 MMHG | RESPIRATION RATE: 18 BRPM

## 2018-09-01 PROCEDURE — 99239 HOSP IP/OBS DSCHRG MGMT >30: CPT | Performed by: HOSPITALIST

## 2018-09-01 RX ORDER — ONDANSETRON 4 MG/1
4 TABLET, FILM COATED ORAL EVERY 8 HOURS PRN
Qty: 5 TABLET | Refills: 0 | Status: SHIPPED | OUTPATIENT
Start: 2018-09-01 | End: 2018-09-11 | Stop reason: ALTCHOICE

## 2018-09-01 RX ORDER — HYDROCODONE BITARTRATE AND ACETAMINOPHEN 5; 325 MG/1; MG/1
1 TABLET ORAL EVERY 8 HOURS PRN
Qty: 10 TABLET | Refills: 0 | Status: SHIPPED | OUTPATIENT
Start: 2018-09-01 | End: 2018-09-11 | Stop reason: ALTCHOICE

## 2018-09-01 RX ORDER — LOSARTAN POTASSIUM 50 MG/1
50 TABLET ORAL DAILY
Qty: 30 TABLET | Refills: 2 | Status: SHIPPED | OUTPATIENT
Start: 2018-09-01 | End: 2018-11-06 | Stop reason: ALTCHOICE

## 2018-09-01 RX ORDER — METRONIDAZOLE 500 MG/1
500 TABLET ORAL 3 TIMES DAILY
Qty: 42 TABLET | Refills: 0 | Status: SHIPPED | OUTPATIENT
Start: 2018-09-01 | End: 2018-09-12

## 2018-09-01 RX ORDER — CIPROFLOXACIN 500 MG/1
500 TABLET, FILM COATED ORAL 2 TIMES DAILY
Qty: 28 TABLET | Refills: 0 | Status: SHIPPED | OUTPATIENT
Start: 2018-09-01 | End: 2018-09-12

## 2018-09-01 NOTE — PLAN OF CARE
VSS,afebrile. Tolerating soft diet w/o issue. + flatus & loose bmx2(pt report). RLQ perc-drain to bulb suction, drain care performed. Reports \"tightness\" to abd when ambulating, prn meds admin w/relif. Up ad randy, voiding freely. Will monitor.      P

## 2018-09-01 NOTE — PROGRESS NOTES
[facility]  Progress Note    Cristobal Decker Patient Status:  Inpatient    1965 MRN [id number]   [facility] 3NW-A Attending Rut Anderson MD   [facility] Day # 3 PCP Tanya Wallace DO     Subjective:  No new complaints.  Feeling b diet  Follow up with your surgeon in 1 week for surgical planning - appendectomy as an outpatient in 6-8 weeks. My total face time with this patient was 25 minutes.   Greater than half of our visit was spent in counseling the patient on the above listed

## 2018-09-01 NOTE — PLAN OF CARE
Problem: Patient/Family Goals  Goal: Patient/Family Long Term Goal  Patient's Long Term Goal: to go home    Interventions:  - npo, pain meds prn, d/c when ordered  - See additional Care Plan goals for specific interventions    Outcome: Progressing  matt die mobilization and activity  - Obtain nutritional consult as needed  - Establish a toileting routine/schedule  - Consider collaborating with pharmacy to review patient's medication profile   Outcome: Progressing      Problem: SKIN/TISSUE INTEGRITY - ADULT  G

## 2018-09-01 NOTE — PROGRESS NOTES
Written and verbal discharge instructions given to patient and spouse verbalize understanding. Drain care instructed, discussed, demonstrated with successful teach back. All questions answered.  IV discontinued in [facility] angio-cath tip intact, site free from red

## 2018-09-02 NOTE — DISCHARGE SUMMARY
[facility]  DISCHARGE SUMMARY     [address] [facility] Patient Status:  Inpatient    1965 MRN [id number]   [facility] 3NW-A Attending No att. providers found   Hosp Day # 3 PCP Tanya Wallace DO     Date of Admission: 2018 Brief Synopsis: admitted and found to have Acute appendicitis with focal perforation and charan-appendiceal abscess so underwent IR drain placement. Definitive surgery was deemed best by surgery to be deferred for about 6-8 weeks into the future.   Will need Stop taking on:  9/15/2018  Quantity:  28 tablet  Refills:  0     Losartan Potassium 50 MG Tabs  Commonly known as:  COZAAR      Take 1 tablet (50 mg total) by mouth daily.    Quantity:  30 tablet  Refills:  2     metRONIDAZOLE 500 MG Tabs  Commonly known Take 1 tablet (160 mg total) by mouth daily. Quantity:  90 tablet  Refills:  3     Fexofenadine HCl 180 MG Tabs  Commonly known as:  ALLEGRA      Take 180 mg by mouth daily.    Refills:  0     Levothyroxine Sodium 100 MCG Tabs  Commonly known as:  SYNTHR -----------------------------------------------------------------------------------------------  PATIENT DISCHARGE INSTRUCTIONS: See electronic chart    Johana Suarez MD 9/2/2018    Time spent:  > 30 minutes

## 2018-09-04 ENCOUNTER — TELEPHONE (OUTPATIENT)
Dept: SURGERY | Facility: CLINIC | Age: 53
End: 2018-09-04

## 2018-09-04 ENCOUNTER — PATIENT OUTREACH (OUTPATIENT)
Dept: CASE MANAGEMENT | Age: 53
End: 2018-09-04

## 2018-09-04 ENCOUNTER — PATIENT MESSAGE (OUTPATIENT)
Dept: CASE MANAGEMENT | Age: 53
End: 2018-09-04

## 2018-09-04 DIAGNOSIS — Z02.9 ENCOUNTERS FOR ADMINISTRATIVE PURPOSE: ICD-10-CM

## 2018-09-04 NOTE — TELEPHONE ENCOUNTER
Spoke to spouse Crystal regarding drain removal/CT. Patient wants to have drain removed early this week. Patient is scheduled on 9/10/18 for CT and possible drain removal. Ok for CT to be done Monday 9/10/18 per Brandin CARTAGENA.

## 2018-09-05 ENCOUNTER — TELEPHONE (OUTPATIENT)
Dept: FAMILY MEDICINE CLINIC | Facility: CLINIC | Age: 53
End: 2018-09-05

## 2018-09-05 NOTE — TELEPHONE ENCOUNTER
Spoke to pt today for TCM and condition update. Pt discharged from hospital on 09/01/18 with acute appendicitis. Pt has not scheduled a TCM/HFU as no openings within 7 day timeframe and is high risk for readmission.   Please f/up with pt to try to schedul

## 2018-09-05 NOTE — PROGRESS NOTES
Initial Post Discharge Follow Up   Discharge Date: 9/1/18  Contact Date: 9/4/2018    Consent Verification:  Assessment Completed With: Patient  HIPAA Verified?   Yes    Discharge Dx:   Acute appendicitis, ASUNCION, Acidosis, Anemia, HTN, HL    General:   • Ho Potassium 50 MG Oral Tab Take 1 tablet (50 mg total) by mouth daily. Disp: 30 tablet Rfl: 2   Fexofenadine HCl 180 MG Oral Tab Take 180 mg by mouth daily.  Disp:  Rfl:    HYDROcodone-acetaminophen (NORCO)  MG Oral Tab Take 1 tablet by mouth every 6 (s addressed before your next visit with your PCP?  (DME, meds, disease concerns, Etc): No     Follow up appointments:       Your appointments     Date & Time Appointment Department [facility])    Sep 10, 2018 11:00 AM CDT CT INJECTION ABSCESS with [address] [facility] CT MAIN RM [address] ([facility])        [address] [address] [place] [address] [address]

## 2018-09-10 ENCOUNTER — HOSPITAL ENCOUNTER (OUTPATIENT)
Dept: CT IMAGING | Facility: HOSPITAL | Age: 53
Discharge: HOME | End: 2018-09-10
Attending: PHYSICIAN ASSISTANT
Payer: COMMERCIAL

## 2018-09-10 DIAGNOSIS — K35.80 ACUTE APPENDICITIS, UNSPECIFIED ACUTE APPENDICITIS TYPE: ICD-10-CM

## 2018-09-10 PROCEDURE — 49424 ASSESS CYST CONTRAST INJECT: CPT | Performed by: PHYSICIAN ASSISTANT

## 2018-09-10 PROCEDURE — 76080 X-RAY EXAM OF FISTULA: CPT | Performed by: PHYSICIAN ASSISTANT

## 2018-09-10 NOTE — PROCEDURES
[facility]  Procedure Note    Jefe Abraham Patient Status:  Outpatient    1965 MRN [id number]   [facility] CT Attending Patrick Welch, [address] Day # 0 PCP Rupinder King DO     Procedure: CT abscessogram    Pre-Proced

## 2018-09-11 ENCOUNTER — OFFICE VISIT (OUTPATIENT)
Dept: FAMILY MEDICINE CLINIC | Facility: CLINIC | Age: 53
End: 2018-09-11
Payer: COMMERCIAL

## 2018-09-11 VITALS
SYSTOLIC BLOOD PRESSURE: 108 MMHG | RESPIRATION RATE: 16 BRPM | TEMPERATURE: 98 F | HEIGHT: 68 IN | BODY MASS INDEX: 32.74 KG/M2 | WEIGHT: 216 LBS | HEART RATE: 92 BPM | DIASTOLIC BLOOD PRESSURE: 76 MMHG | OXYGEN SATURATION: 95 %

## 2018-09-11 DIAGNOSIS — K35.32 ACUTE APPENDICITIS WITH RUPTURE: Primary | ICD-10-CM

## 2018-09-11 PROCEDURE — 99214 OFFICE O/P EST MOD 30 MIN: CPT | Performed by: FAMILY MEDICINE

## 2018-09-11 NOTE — PROGRESS NOTES
A complicated case. I saw him nearly a month ago for acute abdominal pain and as it turned out it represented ruptured appendicitis with abscess.   He now has a drain in place in the ultimate anticipated treatment enforces to remove the drain and to apply

## 2018-09-12 ENCOUNTER — OFFICE VISIT (OUTPATIENT)
Dept: SURGERY | Facility: CLINIC | Age: 53
End: 2018-09-12
Payer: COMMERCIAL

## 2018-09-12 VITALS — DIASTOLIC BLOOD PRESSURE: 84 MMHG | SYSTOLIC BLOOD PRESSURE: 131 MMHG | HEART RATE: 65 BPM

## 2018-09-12 DIAGNOSIS — K35.890 OTHER ACUTE APPENDICITIS: Primary | ICD-10-CM

## 2018-09-12 PROCEDURE — 99214 OFFICE O/P EST MOD 30 MIN: CPT | Performed by: SURGERY

## 2018-09-12 RX ORDER — METRONIDAZOLE 500 MG/1
500 TABLET ORAL 3 TIMES DAILY
Qty: 42 TABLET | Refills: 0 | Status: SHIPPED | OUTPATIENT
Start: 2018-09-12 | End: 2018-09-26

## 2018-09-12 RX ORDER — CIPROFLOXACIN 500 MG/1
500 TABLET, FILM COATED ORAL 2 TIMES DAILY
Qty: 28 TABLET | Refills: 0 | Status: SHIPPED | OUTPATIENT
Start: 2018-09-12 | End: 2018-09-26

## 2018-09-12 NOTE — PROGRESS NOTES
Follow Up Visit Note       Active Problems      No diagnosis found. Chief Complaint   Patient presents with: Follow - Up: follow up after CT/ ED admission. SAILAJA pt. History of Present Illness  Patient denies complaint.   She denies fever chills use: Yes        Alcohol/week: 0.0 oz        Comment: social      Drug use: No      Sexual activity: Not on file    Other Topics      Concerns:         Service: Not Asked        Blood Transfusions: Not Asked        Caffeine Concern: Yes        Occup reviewed by me during today. Review of Systems   Constitutional: Negative for chills, diaphoresis, fatigue, fever and unexpected weight change. HENT: Negative for hearing loss, nosebleeds, sore throat and trouble swallowing.     Respiratory: Negative for communication of presumed open appendiceal stump with abscess cavity and catheter  Plan   Repeat CT scan in 1 week's time once there is no communication and drain can be removed with appendectomy planned for later date         No orders of the defined type

## 2018-09-18 ENCOUNTER — HOSPITAL ENCOUNTER (OUTPATIENT)
Dept: CT IMAGING | Facility: HOSPITAL | Age: 53
Discharge: HOME | End: 2018-09-18
Attending: SURGERY
Payer: COMMERCIAL

## 2018-09-18 DIAGNOSIS — K35.80 ACUTE APPENDICITIS, UNSPECIFIED ACUTE APPENDICITIS TYPE: ICD-10-CM

## 2018-09-18 PROCEDURE — 49424 ASSESS CYST CONTRAST INJECT: CPT | Performed by: SURGERY

## 2018-09-18 PROCEDURE — 76080 X-RAY EXAM OF FISTULA: CPT | Performed by: SURGERY

## 2018-09-25 ENCOUNTER — HOSPITAL ENCOUNTER (OUTPATIENT)
Dept: CT IMAGING | Facility: HOSPITAL | Age: 53
Discharge: HOME | End: 2018-09-25
Attending: RADIOLOGY
Payer: COMMERCIAL

## 2018-09-25 DIAGNOSIS — K35.33 APPENDICEAL ABSCESS: ICD-10-CM

## 2018-09-25 PROCEDURE — 76080 X-RAY EXAM OF FISTULA: CPT | Performed by: RADIOLOGY

## 2018-09-25 PROCEDURE — 49424 ASSESS CYST CONTRAST INJECT: CPT | Performed by: RADIOLOGY

## 2018-09-29 ENCOUNTER — OFFICE VISIT (OUTPATIENT)
Dept: FAMILY MEDICINE CLINIC | Facility: CLINIC | Age: 53
End: 2018-09-29
Payer: COMMERCIAL

## 2018-09-29 VITALS
WEIGHT: 218 LBS | HEART RATE: 56 BPM | SYSTOLIC BLOOD PRESSURE: 150 MMHG | DIASTOLIC BLOOD PRESSURE: 98 MMHG | TEMPERATURE: 98 F | OXYGEN SATURATION: 98 % | HEIGHT: 68 IN | RESPIRATION RATE: 16 BRPM | BODY MASS INDEX: 33.04 KG/M2

## 2018-09-29 DIAGNOSIS — M10.9 ACUTE GOUT INVOLVING TOE OF LEFT FOOT, UNSPECIFIED CAUSE: Primary | ICD-10-CM

## 2018-09-29 DIAGNOSIS — I10 HYPERTENSION, UNSPECIFIED TYPE: ICD-10-CM

## 2018-09-29 PROCEDURE — 99214 OFFICE O/P EST MOD 30 MIN: CPT | Performed by: FAMILY MEDICINE

## 2018-09-29 RX ORDER — COLCHICINE 0.6 MG/1
TABLET ORAL
Qty: 9 TABLET | Refills: 0 | Status: SHIPPED | OUTPATIENT
Start: 2018-09-29 | End: 2018-11-06

## 2018-09-29 RX ORDER — PREDNISONE 20 MG/1
TABLET ORAL
Qty: 15 TABLET | Refills: 0 | Status: SHIPPED | OUTPATIENT
Start: 2018-09-29 | End: 2018-10-16 | Stop reason: ALTCHOICE

## 2018-09-29 RX ORDER — TRAMADOL HYDROCHLORIDE 50 MG/1
50 TABLET ORAL EVERY 6 HOURS PRN
Qty: 20 TABLET | Refills: 0 | Status: SHIPPED | OUTPATIENT
Start: 2018-09-29 | End: 2018-11-06

## 2018-09-29 NOTE — PROGRESS NOTES
HPI:    Patient ID: Kindra Callahan is a 52year old male. Gout   This is a new problem. Episode onset: 1 week ago. The problem occurs constantly. The problem has been gradually improving.  Associated symptoms comments: Pain and swelling of left foot MCG/ACT Inhalation Aero Soln Inhale 2 puffs into the lungs every 6 (six) hours as needed for Wheezing. Disp: 1 Inhaler Rfl: 6   Fenofibrate 160 MG Oral Tab Take 1 tablet (160 mg total) by mouth daily.  Disp: 90 tablet Rfl: 3   Albuterol Sulfate HFA (VENTOLI Si.5 tab day 1-4, 2 tab day 5, 1.5 tab day 6, 1 tab day 7, 0.5 tab day 8   • TraMADol HCl 50 MG Oral Tab 20 tablet 0     Sig: Take 1 tablet (50 mg total) by mouth every 6 (six) hours as needed for Pain.        Imaging & Referrals:  None       ID#[id number]

## 2018-09-29 NOTE — PATIENT INSTRUCTIONS
Treating Gout Attacks     Raising the joint above the level of your heart can help reduce gout symptoms. Gout is a disease that affects the joints. It is caused by excess uric acid in your blood that may lead to crystals forming in your joints.  Left ? Shellfish (lobster, crab, shrimp, scallop, mussel)  ? Certain fish (anchovy, sardine, herring, mackerel)  · Take any medicines prescribed by your healthcare provider. · Lose weight if you need to. · Reduce high fructose corn syrup in meals and drinks. · Mushrooms, spinach, asparagus, and cauliflower  · Yeast and yeast extract supplements  Foods to try  Some foods may be helpful for people with gout. You may want to try adding some of the following foods to your diet:  · Dark berries.  These include blueb

## 2018-10-02 ENCOUNTER — OFFICE VISIT (OUTPATIENT)
Dept: SURGERY | Facility: CLINIC | Age: 53
End: 2018-10-02
Payer: COMMERCIAL

## 2018-10-02 VITALS
HEART RATE: 59 BPM | SYSTOLIC BLOOD PRESSURE: 129 MMHG | BODY MASS INDEX: 32.74 KG/M2 | HEIGHT: 68 IN | DIASTOLIC BLOOD PRESSURE: 83 MMHG | TEMPERATURE: 98 F | WEIGHT: 216 LBS

## 2018-10-02 DIAGNOSIS — K37 APPENDICITIS, UNSPECIFIED APPENDICITIS TYPE: Primary | ICD-10-CM

## 2018-10-02 DIAGNOSIS — K35.33 ACUTE APPENDICITIS WITH PERFORATION, LOCALIZED PERITONITIS, AND ABSCESS, UNSPECIFIED WHETHER GANGRENE PRESENT: ICD-10-CM

## 2018-10-02 PROCEDURE — 99213 OFFICE O/P EST LOW 20 MIN: CPT | Performed by: SURGERY

## 2018-10-02 NOTE — PROGRESS NOTES
Follow Up Visit Note       Active Problems      1. Appendicitis, unspecified appendicitis type    2.  Acute appendicitis with perforation, localized peritonitis, and abscess, unspecified whether gangrene present          Chief Complaint   Patient presents w Comment: social      Drug use: No      Sexual activity: Not on file    Other Topics      Concerns:         Service: Not Asked        Blood Transfusions: Not Asked        Caffeine Concern: Yes        Occupational Exposure: Not Asked        Clark Tam Disp:  Rfl:         Review of Systems  The Review of Systems has been reviewed by me during today. Review of Systems   Constitutional: Negative for chills, diaphoresis, fatigue, fever and unexpected weight change.    HENT: Negative for hearing loss, nosebl encounter diagnosis)  Acute appendicitis with perforation, localized peritonitis, and abscess, unspecified whether gangrene present  47-year-old gentleman who was recently admitted for acute appendicitis with periappendiceal abscess  The patient was treate

## 2018-10-16 ENCOUNTER — OFFICE VISIT (OUTPATIENT)
Dept: FAMILY MEDICINE CLINIC | Facility: CLINIC | Age: 53
End: 2018-10-16
Payer: COMMERCIAL

## 2018-10-16 ENCOUNTER — TELEPHONE (OUTPATIENT)
Dept: FAMILY MEDICINE CLINIC | Facility: CLINIC | Age: 53
End: 2018-10-16

## 2018-10-16 VITALS
WEIGHT: 220 LBS | HEART RATE: 70 BPM | RESPIRATION RATE: 16 BRPM | SYSTOLIC BLOOD PRESSURE: 124 MMHG | OXYGEN SATURATION: 98 % | HEIGHT: 68 IN | TEMPERATURE: 98 F | DIASTOLIC BLOOD PRESSURE: 78 MMHG | BODY MASS INDEX: 33.34 KG/M2

## 2018-10-16 DIAGNOSIS — M10.079 IDIOPATHIC GOUT OF FOOT, UNSPECIFIED CHRONICITY, UNSPECIFIED LATERALITY: Primary | ICD-10-CM

## 2018-10-16 PROCEDURE — 99213 OFFICE O/P EST LOW 20 MIN: CPT | Performed by: FAMILY MEDICINE

## 2018-10-16 RX ORDER — PREDNISONE 10 MG/1
TABLET ORAL
Qty: 14 TABLET | Refills: 0 | Status: SHIPPED | OUTPATIENT
Start: 2018-10-16 | End: 2018-10-23

## 2018-10-16 NOTE — PROGRESS NOTES
Here for follow-up. Unfortunately this gentleman who was been healing from a ruptured appendix a number of months ago is also come down with gout. It runs heavily in his family and this is his first encounter.   He just finished a course of anti-inflammat

## 2018-10-16 NOTE — TELEPHONE ENCOUNTER
Patients wife called. Patient has gout and wants to see Nasrin Franklin either on Wed or Thursday before 1pm.    Please advise.

## 2018-10-31 ENCOUNTER — TELEPHONE (OUTPATIENT)
Dept: FAMILY MEDICINE CLINIC | Facility: CLINIC | Age: 53
End: 2018-10-31

## 2018-10-31 RX ORDER — PREDNISONE 10 MG/1
10 TABLET ORAL DAILY
Qty: 30 TABLET | Refills: 0 | Status: SHIPPED | OUTPATIENT
Start: 2018-10-31 | End: 2018-11-29

## 2018-10-31 NOTE — TELEPHONE ENCOUNTER
Saw DOV 2 weeks ago for gout and was put on steroids. Gout has come back. DOV talked about putting her  on some medication but never sent a prescription in. Her  is scheduled for surgery on 11/6.   She said he can't go into surgery with adalberto

## 2018-10-31 NOTE — TELEPHONE ENCOUNTER
Spoke to pt wife,informed pt to take Prednisone 10mg daily. Also informed to notify anesthesiologist of daily prednisone dose. also instructed to FU with Dr German Manzaon prior to surgery. Understanding verbalized. Task complete

## 2018-10-31 NOTE — TELEPHONE ENCOUNTER
See previous message pt c/o gout flareup finished steroids from previous visit. Pt having appendectomy in 2 weeks. Please advise.

## 2018-11-06 ENCOUNTER — TELEPHONE (OUTPATIENT)
Dept: FAMILY MEDICINE CLINIC | Facility: CLINIC | Age: 53
End: 2018-11-06

## 2018-11-06 ENCOUNTER — TELEPHONE (OUTPATIENT)
Dept: SURGERY | Facility: CLINIC | Age: 53
End: 2018-11-06

## 2018-11-06 ENCOUNTER — OFFICE VISIT (OUTPATIENT)
Dept: FAMILY MEDICINE CLINIC | Facility: CLINIC | Age: 53
End: 2018-11-06
Payer: COMMERCIAL

## 2018-11-06 VITALS
SYSTOLIC BLOOD PRESSURE: 116 MMHG | DIASTOLIC BLOOD PRESSURE: 80 MMHG | HEART RATE: 90 BPM | RESPIRATION RATE: 16 BRPM | WEIGHT: 220 LBS | TEMPERATURE: 98 F | OXYGEN SATURATION: 97 % | BODY MASS INDEX: 33 KG/M2

## 2018-11-06 DIAGNOSIS — K37 APPENDICITIS, UNSPECIFIED APPENDICITIS TYPE: Primary | ICD-10-CM

## 2018-11-06 DIAGNOSIS — Z23 NEED FOR VACCINATION: ICD-10-CM

## 2018-11-06 PROCEDURE — 90471 IMMUNIZATION ADMIN: CPT | Performed by: FAMILY MEDICINE

## 2018-11-06 PROCEDURE — 99214 OFFICE O/P EST MOD 30 MIN: CPT | Performed by: FAMILY MEDICINE

## 2018-11-06 PROCEDURE — 90686 IIV4 VACC NO PRSV 0.5 ML IM: CPT | Performed by: FAMILY MEDICINE

## 2018-11-06 RX ORDER — LOSARTAN POTASSIUM AND HYDROCHLOROTHIAZIDE 12.5; 5 MG/1; MG/1
TABLET ORAL
COMMUNITY
Start: 2018-09-21 | End: 2018-12-05

## 2018-11-06 NOTE — PROGRESS NOTES
Originally here for preoperative clearance in 7 days he is scheduled to have laparoscopic appendectomy by Dr. Ang Martinez at [facility]..  His only other surgery of importance was that of an initial staging of an appendiceal abscess 6 weeks ago at of gout along with dehydration. Subsequent testing is now showing resolution of these renal functions. Symptomatically he is ready and able to undergo surgery. I spoke to Dr. Safia Martinez yesterday for verification.   After further review of this case I fee

## 2018-11-07 ENCOUNTER — TELEPHONE (OUTPATIENT)
Dept: NEPHROLOGY | Facility: CLINIC | Age: 53
End: 2018-11-07

## 2018-11-07 ENCOUNTER — TELEPHONE (OUTPATIENT)
Dept: FAMILY MEDICINE CLINIC | Facility: CLINIC | Age: 53
End: 2018-11-07

## 2018-11-07 NOTE — TELEPHONE ENCOUNTER
I called and spoke with the patient today. He was hospitalized at the end of August and was found to have acute kidney injury related to prerenal azotemia as well as excessive nonsteroidal anti-inflammatory use.   During that hospital stay his creatinine i

## 2018-11-07 NOTE — TELEPHONE ENCOUNTER
Dr. Yao Valadez, preadmission testing calling, is pt cleared for surgery? Was EKG done?     Office notes from 11/6/2018 states \"The plan I will hold surgery at the current time until we did discuss this case with nephrology i.e. Dr. Shiloh Marie and colleagues\"

## 2018-11-08 ENCOUNTER — TELEPHONE (OUTPATIENT)
Dept: SURGERY | Facility: CLINIC | Age: 53
End: 2018-11-08

## 2018-11-08 NOTE — TELEPHONE ENCOUNTER
Note from Dr. Macarena Chang given to JG>  Per Mague Zhang, he would like to discuss with Dr. Macarena Chang or Dr. Zuleyka Deutsch. Office will page doctor.  Back line number and JG cell number given

## 2018-11-08 NOTE — PAT NURSING NOTE
Received call from Walter Mina with [facility] stating that patient is not cleared for surgery until report from nephrologist  received.  Rosa Fong was contacted by Morgan Quintanilla and advised of need for evaluation of renal status

## 2018-11-08 NOTE — TELEPHONE ENCOUNTER
Left message for valeria, charge nurse,  EKG faxed, however unsure if pt is cleared yet. Awaiting appt with Dr. Michael Right today.

## 2018-11-08 NOTE — TELEPHONE ENCOUNTER
Notified Ana Paula Perez, charge nurse at preadmission testing.   Await for appt with Dr. Jay Perez today

## 2018-11-08 NOTE — TELEPHONE ENCOUNTER
Not yet cleared       Has thurs appt with dr. Elkin Schroeder    For acute renal failure          Also spoke to dr Han Norwood    Is awore of the possible need to cancel.    scarg

## 2018-11-09 NOTE — TELEPHONE ENCOUNTER
Olena Vazquez from [facility] pre admission dept called to check on the status of pt's clearance for surgery, pt is schedule for surgery on Monday, please call Olena Vazquez at [phone]

## 2018-11-09 NOTE — TELEPHONE ENCOUNTER
[id number] [facility] has cleared pt, added addendum to 11-6-18 H+P OV notes. LMOM for Percy Saldivar with this information. Requested a return call with questions. Task completed.

## 2018-11-11 ENCOUNTER — ANESTHESIA EVENT (OUTPATIENT)
Dept: SURGERY | Facility: HOSPITAL | Age: 53
End: 2018-11-11

## 2018-11-12 ENCOUNTER — HOSPITAL ENCOUNTER (OUTPATIENT)
Facility: HOSPITAL | Age: 53
Setting detail: HOSPITAL OUTPATIENT SURGERY
Discharge: HOME | End: 2018-11-12
Attending: SURGERY | Admitting: SURGERY
Payer: COMMERCIAL

## 2018-11-12 ENCOUNTER — ANESTHESIA (OUTPATIENT)
Dept: SURGERY | Facility: HOSPITAL | Age: 53
End: 2018-11-12

## 2018-11-12 VITALS
TEMPERATURE: 99 F | HEIGHT: 68 IN | BODY MASS INDEX: 34.38 KG/M2 | DIASTOLIC BLOOD PRESSURE: 79 MMHG | HEART RATE: 60 BPM | RESPIRATION RATE: 20 BRPM | WEIGHT: 226.88 LBS | OXYGEN SATURATION: 96 % | SYSTOLIC BLOOD PRESSURE: 147 MMHG

## 2018-11-12 DIAGNOSIS — K37 APPENDICITIS, UNSPECIFIED APPENDICITIS TYPE: ICD-10-CM

## 2018-11-12 PROCEDURE — 88304 TISSUE EXAM BY PATHOLOGIST: CPT | Performed by: SURGERY

## 2018-11-12 PROCEDURE — 88342 IMHCHEM/IMCYTCHM 1ST ANTB: CPT | Performed by: SURGERY

## 2018-11-12 PROCEDURE — 0DNW4ZZ RELEASE PERITONEUM, PERCUTANEOUS ENDOSCOPIC APPROACH: ICD-10-PCS | Performed by: SURGERY

## 2018-11-12 PROCEDURE — 99244 OFF/OP CNSLTJ NEW/EST MOD 40: CPT | Performed by: SURGERY

## 2018-11-12 PROCEDURE — 0DTJ4ZZ RESECTION OF APPENDIX, PERCUTANEOUS ENDOSCOPIC APPROACH: ICD-10-PCS | Performed by: SURGERY

## 2018-11-12 RX ORDER — METOCLOPRAMIDE HYDROCHLORIDE 5 MG/ML
10 INJECTION INTRAMUSCULAR; INTRAVENOUS AS NEEDED
Status: DISCONTINUED | OUTPATIENT
Start: 2018-11-12 | End: 2018-11-12

## 2018-11-12 RX ORDER — MORPHINE SULFATE 4 MG/ML
2 INJECTION, SOLUTION INTRAMUSCULAR; INTRAVENOUS EVERY 5 MIN PRN
Status: DISCONTINUED | OUTPATIENT
Start: 2018-11-12 | End: 2018-11-12

## 2018-11-12 RX ORDER — ACETAMINOPHEN 500 MG
1000 TABLET ORAL ONCE
Status: ON HOLD | COMMUNITY
End: 2018-11-12

## 2018-11-12 RX ORDER — MEPERIDINE HYDROCHLORIDE 25 MG/ML
INJECTION INTRAMUSCULAR; INTRAVENOUS; SUBCUTANEOUS
Status: COMPLETED
Start: 2018-11-12 | End: 2018-11-12

## 2018-11-12 RX ORDER — ALBUTEROL SULFATE 2.5 MG/3ML
2.5 SOLUTION RESPIRATORY (INHALATION) AS NEEDED
Status: DISCONTINUED | OUTPATIENT
Start: 2018-11-12 | End: 2018-11-12

## 2018-11-12 RX ORDER — BACITRACIN 50000 [USP'U]/1
INJECTION, POWDER, LYOPHILIZED, FOR SOLUTION INTRAMUSCULAR AS NEEDED
Status: DISCONTINUED | OUTPATIENT
Start: 2018-11-12 | End: 2018-11-12 | Stop reason: HOSPADM

## 2018-11-12 RX ORDER — ACETAMINOPHEN 500 MG
1000 TABLET ORAL ONCE AS NEEDED
Status: DISCONTINUED | OUTPATIENT
Start: 2018-11-12 | End: 2018-11-12

## 2018-11-12 RX ORDER — ONDANSETRON 2 MG/ML
4 INJECTION INTRAMUSCULAR; INTRAVENOUS AS NEEDED
Status: DISCONTINUED | OUTPATIENT
Start: 2018-11-12 | End: 2018-11-12

## 2018-11-12 RX ORDER — NALOXONE HYDROCHLORIDE 0.4 MG/ML
80 INJECTION, SOLUTION INTRAMUSCULAR; INTRAVENOUS; SUBCUTANEOUS AS NEEDED
Status: DISCONTINUED | OUTPATIENT
Start: 2018-11-12 | End: 2018-11-12

## 2018-11-12 RX ORDER — BUPIVACAINE HYDROCHLORIDE AND EPINEPHRINE 5; 5 MG/ML; UG/ML
INJECTION, SOLUTION EPIDURAL; INTRACAUDAL; PERINEURAL AS NEEDED
Status: DISCONTINUED | OUTPATIENT
Start: 2018-11-12 | End: 2018-11-12 | Stop reason: HOSPADM

## 2018-11-12 RX ORDER — HYDROCODONE BITARTRATE AND ACETAMINOPHEN 5; 325 MG/1; MG/1
TABLET ORAL
Status: DISCONTINUED
Start: 2018-11-12 | End: 2018-11-12

## 2018-11-12 RX ORDER — SODIUM CHLORIDE, SODIUM LACTATE, POTASSIUM CHLORIDE, CALCIUM CHLORIDE 600; 310; 30; 20 MG/100ML; MG/100ML; MG/100ML; MG/100ML
INJECTION, SOLUTION INTRAVENOUS CONTINUOUS
Status: DISCONTINUED | OUTPATIENT
Start: 2018-11-12 | End: 2018-11-12

## 2018-11-12 RX ORDER — POLYMYXIN B 500000 [USP'U]/1
INJECTION, POWDER, LYOPHILIZED, FOR SOLUTION INTRAMUSCULAR; INTRATHECAL; INTRAVENOUS; OPHTHALMIC AS NEEDED
Status: DISCONTINUED | OUTPATIENT
Start: 2018-11-12 | End: 2018-11-12 | Stop reason: HOSPADM

## 2018-11-12 RX ORDER — ACETAMINOPHEN 500 MG
1000 TABLET ORAL ONCE
Status: DISCONTINUED | OUTPATIENT
Start: 2018-11-12 | End: 2018-11-12 | Stop reason: HOSPADM

## 2018-11-12 RX ORDER — HYDROCODONE BITARTRATE AND ACETAMINOPHEN 10; 325 MG/1; MG/1
1 TABLET ORAL EVERY 6 HOURS PRN
Qty: 30 TABLET | Refills: 0 | Status: ON HOLD | OUTPATIENT
Start: 2018-11-12 | End: 2018-12-14

## 2018-11-12 RX ORDER — HYDROCODONE BITARTRATE AND ACETAMINOPHEN 5; 325 MG/1; MG/1
2 TABLET ORAL AS NEEDED
Status: COMPLETED | OUTPATIENT
Start: 2018-11-12 | End: 2018-11-12

## 2018-11-12 RX ORDER — HYDROCODONE BITARTRATE AND ACETAMINOPHEN 5; 325 MG/1; MG/1
1 TABLET ORAL AS NEEDED
Status: COMPLETED | OUTPATIENT
Start: 2018-11-12 | End: 2018-11-12

## 2018-11-12 RX ORDER — HEPARIN SODIUM 5000 [USP'U]/ML
5000 INJECTION, SOLUTION INTRAVENOUS; SUBCUTANEOUS ONCE
Status: COMPLETED | OUTPATIENT
Start: 2018-11-12 | End: 2018-11-12

## 2018-11-12 RX ORDER — DIPHENHYDRAMINE HYDROCHLORIDE 50 MG/ML
12.5 INJECTION INTRAMUSCULAR; INTRAVENOUS AS NEEDED
Status: DISCONTINUED | OUTPATIENT
Start: 2018-11-12 | End: 2018-11-12

## 2018-11-12 RX ORDER — AMOXICILLIN AND CLAVULANATE POTASSIUM 875; 125 MG/1; MG/1
1 TABLET, FILM COATED ORAL 2 TIMES DAILY
Qty: 10 TABLET | Refills: 0 | Status: SHIPPED | OUTPATIENT
Start: 2018-11-12 | End: 2018-11-17

## 2018-11-12 RX ORDER — MEPERIDINE HYDROCHLORIDE 25 MG/ML
12.5 INJECTION INTRAMUSCULAR; INTRAVENOUS; SUBCUTANEOUS AS NEEDED
Status: DISCONTINUED | OUTPATIENT
Start: 2018-11-12 | End: 2018-11-12

## 2018-11-12 NOTE — ANESTHESIA PREPROCEDURE EVALUATION
PRE-OP EVALUATION    Patient Name: Shashank Dickerson    Pre-op Diagnosis: Appendicitis, unspecified appendicitis type [K37]    Procedure(s):  LAPAROSCOPIC APPENDECTOMY    Surgeon(s) and Role:     Dennis Pandya MD - Primary    Pre-op vitals reviewed. Endo/Other           (+) hypothyroidism                       Pulmonary  Comment: Former smoker    (+) asthma                     Neuro/Psych    Negative neuro/psych ROS.                                 Past Surgical History:   Procedure Danuta House

## 2018-11-12 NOTE — ANESTHESIA POSTPROCEDURE EVALUATION
[address] Patient Status:  Hospital Outpatient Surgery   Age/Gender 52year old male MRN [id number]   [facility] SURGERY Attending Tamela Negron MD   [facility] Day # 0 PCP Chemo Antonio DO       Anesthesia Post-op

## 2018-11-12 NOTE — H&P
History & Physical Examination    Patient Name: Gerri Salmeron  MRN: [id number]  CSN: [id number]  YOB: 1965    Diagnosis: history of perforated appendicitis with abscess s/p percutaneous drainage of abscess.   Pt admitted today for interval Facility-Administered Medications:  lactated ringers infusion  Intravenous Continuous   [MAR Hold] acetaminophen (TYLENOL EXTRA STRENGTH) tab 1,000 mg 1,000 mg Oral Once   CefOXitin Sodium (MEFOXIN) 2 g in sodium chloride 0.9 % 100 mL MBP 2 g Intravenous O

## 2018-11-13 NOTE — OR NURSING
Detailed discharged instructions given. Wife verbally understands. Patient up to urinate and small amount saline serosanguinous drainage out of incisions sites. r Demonstrated how to care for drainage change dressing as needed.

## 2018-11-13 NOTE — OPERATIVE REPORT
[facility]  Operative Note    Rafael Slick Location: OR   CSN [id number] MRN [id number]   Admission Date 11/12/2018 Operation Date 11/12/2018   Attending Physician No att. providers found Operating Physician Mario Fairchild MD     Date of procedur open appendectomy. The patient states understanding and does not have any further questions at this time and does wish to proceed with surgery. Summary of case:  The patient was taken to the operating room and was placed on the OR table in the supine po mesoappendix and base of the appendix was identified and found to be intact. There was no evidence of bleeding. The right lower quadrant and pelvis was copiously irrigated with 2 L antibiotic irrigation fluid until the irrigant was clear.   Drain was placed

## 2018-11-14 ENCOUNTER — TELEPHONE (OUTPATIENT)
Dept: SURGERY | Facility: CLINIC | Age: 53
End: 2018-11-14

## 2018-11-14 NOTE — TELEPHONE ENCOUNTER
Pts wife phoned office, pt had lap appy with Dr. Jv Hackett. Requesting refill of Norco 10/325. Pt has tried alternating with motrin without good results.    Future Appointments   Date Time Provider Genesis Casarez   11/16/2018 10:15 AM Milad Apple MD E

## 2018-11-16 ENCOUNTER — APPOINTMENT (OUTPATIENT)
Dept: LAB | Facility: HOSPITAL | Age: 53
End: 2018-11-16
Attending: SURGERY
Payer: COMMERCIAL

## 2018-11-16 ENCOUNTER — OFFICE VISIT (OUTPATIENT)
Dept: SURGERY | Facility: CLINIC | Age: 53
End: 2018-11-16
Payer: COMMERCIAL

## 2018-11-16 ENCOUNTER — HOSPITAL ENCOUNTER (OUTPATIENT)
Dept: CT IMAGING | Facility: HOSPITAL | Age: 53
Discharge: HOME | End: 2018-11-16
Attending: SURGERY
Payer: COMMERCIAL

## 2018-11-16 ENCOUNTER — TELEPHONE (OUTPATIENT)
Dept: SURGERY | Facility: CLINIC | Age: 53
End: 2018-11-16

## 2018-11-16 VITALS
TEMPERATURE: 98 F | WEIGHT: 224 LBS | HEART RATE: 58 BPM | SYSTOLIC BLOOD PRESSURE: 139 MMHG | DIASTOLIC BLOOD PRESSURE: 85 MMHG | BODY MASS INDEX: 34 KG/M2

## 2018-11-16 DIAGNOSIS — C18.1 PRIMARY MUCINOUS ADENOCARCINOMA OF APPENDIX (HCC): Primary | ICD-10-CM

## 2018-11-16 PROCEDURE — 74178 CT ABD&PLV WO CNTR FLWD CNTR: CPT | Performed by: SURGERY

## 2018-11-16 PROCEDURE — 71270 CT THORAX DX C-/C+: CPT | Performed by: SURGERY

## 2018-11-16 PROCEDURE — 82378 CARCINOEMBRYONIC ANTIGEN: CPT

## 2018-11-16 PROCEDURE — 36415 COLL VENOUS BLD VENIPUNCTURE: CPT

## 2018-11-16 RX ORDER — POLYETHYLENE GLYCOL 3350, SODIUM CHLORIDE, SODIUM BICARBONATE, POTASSIUM CHLORIDE 420; 11.2; 5.72; 1.48 G/4L; G/4L; G/4L; G/4L
POWDER, FOR SOLUTION ORAL
Qty: 1 BOTTLE | Refills: 0 | Status: ON HOLD | OUTPATIENT
Start: 2018-11-16 | End: 2018-12-14

## 2018-11-16 NOTE — TELEPHONE ENCOUNTER
LVMM to discuss appointment information for consult and review health history. Call back number left.

## 2018-11-16 NOTE — H&P
New Patient Visit Note       Active Problems      1.  Primary mucinous adenocarcinoma of appendix [facility])        Chief Complaint   Patient presents with:  Post-Op: post op 72/60 lap appy, umbilical hernia repair, per pt drain is < 10 ml today      Allergies Drug use: No      Sexual activity: Not on file    Other Topics      Concerns:         Service: Not Asked        Blood Transfusions: Not Asked        Caffeine Concern: Yes        Occupational Exposure: Not Asked        Hobby Hazards: Not Asked Systems  The Review of Systems has been reviewed by me during today. Review of Systems   Constitutional: Negative for diaphoresis, fever and unexpected weight change. HENT: Negative for congestion, nosebleeds, sore throat and trouble swallowing.     Eyes was recently admitted for acute appendicitis with periappendiceal abscess on August 31, 2018  Litzymilka Marquis had not been feeling well for 2 weeks prior to admission  CT scan revealed perforated acute appendicitis with 3 cm adjacent abscess  Other findings including (primary encounter diagnosis)        Plan     CT scan chest abdomen pelvis for staging workup  CBC, CMP, CEA  Screening colonoscopy December 7  Patient to see Dr. Wiliam Davis this Monday             The risks, benefits, complications, possible outcomes and alte

## 2018-11-19 ENCOUNTER — OFFICE VISIT (OUTPATIENT)
Dept: SURGERY | Facility: CLINIC | Age: 53
End: 2018-11-19
Payer: COMMERCIAL

## 2018-11-19 ENCOUNTER — LAB ENCOUNTER (OUTPATIENT)
Dept: LAB | Facility: HOSPITAL | Age: 53
End: 2018-11-19
Attending: SURGERY
Payer: COMMERCIAL

## 2018-11-19 VITALS
RESPIRATION RATE: 18 BRPM | SYSTOLIC BLOOD PRESSURE: 196 MMHG | OXYGEN SATURATION: 99 % | TEMPERATURE: 98 F | HEIGHT: 68 IN | WEIGHT: 219 LBS | DIASTOLIC BLOOD PRESSURE: 97 MMHG | BODY MASS INDEX: 33.19 KG/M2 | HEART RATE: 70 BPM

## 2018-11-19 DIAGNOSIS — C18.1 PRIMARY APPENDICEAL ADENOCARCINOMA (HCC): Primary | ICD-10-CM

## 2018-11-19 DIAGNOSIS — C18.1 PRIMARY APPENDICEAL ADENOCARCINOMA (HCC): ICD-10-CM

## 2018-11-19 DIAGNOSIS — C18.1 PRIMARY MUCINOUS ADENOCARCINOMA OF APPENDIX (HCC): Primary | ICD-10-CM

## 2018-11-19 PROCEDURE — 80053 COMPREHEN METABOLIC PANEL: CPT

## 2018-11-19 PROCEDURE — 86301 IMMUNOASSAY TUMOR CA 19-9: CPT

## 2018-11-19 PROCEDURE — 85025 COMPLETE CBC W/AUTO DIFF WBC: CPT

## 2018-11-19 PROCEDURE — 86304 IMMUNOASSAY TUMOR CA 125: CPT

## 2018-11-19 PROCEDURE — 99245 OFF/OP CONSLTJ NEW/EST HI 55: CPT | Performed by: SURGERY

## 2018-11-19 PROCEDURE — 36415 COLL VENOUS BLD VENIPUNCTURE: CPT

## 2018-11-19 RX ORDER — METRONIDAZOLE 500 MG/1
500 TABLET ORAL 3 TIMES DAILY
Qty: 3 TABLET | Refills: 0 | Status: ON HOLD | OUTPATIENT
Start: 2018-11-19 | End: 2018-12-14

## 2018-11-19 RX ORDER — NEOMYCIN SULFATE 500 MG/1
1000 TABLET ORAL 3 TIMES DAILY
Qty: 6 TABLET | Refills: 0 | Status: SHIPPED | OUTPATIENT
Start: 2018-11-19 | End: 2018-11-20

## 2018-11-19 NOTE — PATIENT INSTRUCTIONS
Surgery: Cytoreductive surgery, possible multi-organ resection, hyperthermic intraperitoneal chemotherapy (HIPEC) for appendiceal cancer.     Date of Surgery: 12/11/18    Hosptial:    [place] [address] [address], [address]  Phone: 6 to surgery time), unless instructed otherwise by your surgeon. · Bring your picture ID and insurance card with you. · Wear comfortable clothing that can easily be removed. Preferably, something that zips, snaps, or buttons up the front.    · You will b

## 2018-11-20 DIAGNOSIS — C18.1 PRIMARY MUCINOUS ADENOCARCINOMA OF APPENDIX (HCC): Primary | ICD-10-CM

## 2018-11-20 RX ORDER — ALPRAZOLAM 0.5 MG/1
0.5 TABLET ORAL EVERY 6 HOURS
Qty: 40 TABLET | Refills: 0 | Status: SHIPPED | OUTPATIENT
Start: 2018-11-20 | End: 2019-01-04

## 2018-11-21 ENCOUNTER — TELEPHONE (OUTPATIENT)
Dept: SURGERY | Facility: CLINIC | Age: 53
End: 2018-11-21

## 2018-11-21 PROBLEM — C18.1: Status: ACTIVE | Noted: 2018-08-28

## 2018-11-21 NOTE — H&P (VIEW-ONLY)
[facility] Surgical Oncology    Patient Name:  Louisa Parish   YOB: 1965   Gender:  Male   Appt Date:  11/19/2018   Provider:  Ethan Blunt MD   Insurance:  NAVIGATE [address] [facility] [address]  Referring Provider: Meseret Robles MD    [facility] •  ALPRAZolam 0.5 MG Oral Tab, Take 1 tablet (0.5 mg total) by mouth every 6 (six) hours. , Disp: 40 tablet, Rfl: 0  •  PEG 3350-KCl-Na Bicarb-NaCl (TRILYTE) 420 g Oral Recon Soln, Starting at 4:00 pm the night before procedure, drink 8 ounces of the prep e Types: Cigarettes        Quit date: 2016        Years since quittin.6      Smokeless tobacco: Former User        Quit date: 2003    Alcohol use:  Yes      Alcohol/week: 0.0 oz      Comment: social    Drug use: No     Reviewed:  Family H Findings were discussed with the patient and his wife at length. The case was subsequently discussed at our tumor board.   I recommended, based on tumor board discussion, right hemicolectomy with cytoreductive surgery and hyperthermic intraperitoneal chemo

## 2018-11-21 NOTE — TELEPHONE ENCOUNTER
Addressed further questions with family. Offered to have Dr. Maciel Peterson speak with them on phone or to come in for OV to discuss further questions.

## 2018-11-21 NOTE — CONSULTS
[facility]    Patient Name:  Skip Shone   YOB: 1965   Gender:  Male   Appt Date:  11/19/2018   Provider:  Shabbir Mayo MD   Insurance:  NAVIGATE [address] [place] [address] Street  Referring Provider: Kaila Angel MD    [facility] •  ALPRAZolam 0.5 MG Oral Tab, Take 1 tablet (0.5 mg total) by mouth every 6 (six) hours. , Disp: 40 tablet, Rfl: 0  •  PEG 3350-KCl-Na Bicarb-NaCl (TRILYTE) 420 g Oral Recon Soln, Starting at 4:00 pm the night before procedure, drink 8 ounces of the prep e Types: Cigarettes        Quit date: 2016        Years since quittin.6      Smokeless tobacco: Former User        Quit date: 2003    Alcohol use:  Yes      Alcohol/week: 0.0 oz      Comment: social    Drug use: No     Reviewed:  Family H Findings were discussed with the patient and his wife at length. The case was subsequently discussed at our tumor board.   I recommended, based on tumor board discussion, right hemicolectomy with cytoreductive surgery and hyperthermic intraperitoneal chemo

## 2018-11-25 DIAGNOSIS — E03.9 HYPOTHYROIDISM, UNSPECIFIED TYPE: ICD-10-CM

## 2018-11-26 DIAGNOSIS — C18.1: Primary | ICD-10-CM

## 2018-11-26 RX ORDER — LEVOTHYROXINE SODIUM 0.1 MG/1
TABLET ORAL
Qty: 90 TABLET | Refills: 1 | Status: SHIPPED | OUTPATIENT
Start: 2018-11-26 | End: 2019-07-02

## 2018-11-27 ENCOUNTER — TELEPHONE (OUTPATIENT)
Dept: SURGERY | Facility: CLINIC | Age: 53
End: 2018-11-27

## 2018-11-27 DIAGNOSIS — E88.81 METABOLIC SYNDROME: ICD-10-CM

## 2018-11-27 DIAGNOSIS — E78.1 HYPERTRIGLYCERIDEMIA: ICD-10-CM

## 2018-11-27 RX ORDER — PRAVASTATIN SODIUM 20 MG
TABLET ORAL
Qty: 90 TABLET | Refills: 0 | Status: SHIPPED | OUTPATIENT
Start: 2018-11-27 | End: 2019-03-17

## 2018-11-27 NOTE — TELEPHONE ENCOUNTER
Dr. Rajinder Rodríguez attempt to reach Dandy Kaur [phone] to discuss questions. Will try back another time.

## 2018-11-27 NOTE — TELEPHONE ENCOUNTER
Returned Alejandra's call. Reviewed lab results and answered further questions regarding surgery. Patient has colonoscopy scheduled on 12/7/18. Wife requests call from Dr. Lissette Marion to address further questions.

## 2018-11-28 NOTE — TELEPHONE ENCOUNTER
Approve/Deny Prednisone  Pt scheduled for surgery 12/11/18.   Last filled 10/31/18  Last ov 11/16/18  Pt on for gout

## 2018-11-28 NOTE — TELEPHONE ENCOUNTER
Prednisone refill - any question to wife Gregor Ruiz. Pt is scheduled for surgery and wife wants to make sure \"all ducks in a row\" .   pls call to discuss

## 2018-11-29 RX ORDER — PREDNISONE 10 MG/1
10 TABLET ORAL DAILY
Qty: 30 TABLET | Refills: 0 | Status: SHIPPED | OUTPATIENT
Start: 2018-11-29 | End: 2019-01-04

## 2018-11-30 ENCOUNTER — TELEPHONE (OUTPATIENT)
Dept: FAMILY MEDICINE CLINIC | Facility: CLINIC | Age: 53
End: 2018-11-30

## 2018-11-30 DIAGNOSIS — C18.1: Primary | ICD-10-CM

## 2018-11-30 NOTE — TELEPHONE ENCOUNTER
Calling about an order needed for a colonoscopy for his [employer]. She faxed us a form and notes today. Pls let her know if this was not received.

## 2018-12-03 ENCOUNTER — TELEPHONE (OUTPATIENT)
Dept: FAMILY MEDICINE CLINIC | Facility: CLINIC | Age: 53
End: 2018-12-03

## 2018-12-03 DIAGNOSIS — C18.1 ADENOCARCINOMA OF APPENDIX (HCC): Primary | ICD-10-CM

## 2018-12-03 NOTE — TELEPHONE ENCOUNTER
Call received from [facility] at  Dr. Tadeo Fernando office. Pt having colonoscopy on 12/7/18, needs referral.   Entered.  Await approval.

## 2018-12-03 NOTE — TELEPHONE ENCOUNTER
Referral order entered. Office notes from Dr. Satnam Srivastava faxed to referral dept.   Holding papers in triage referral folder for approval.

## 2018-12-05 RX ORDER — LOSARTAN POTASSIUM 50 MG/1
50 TABLET ORAL DAILY
COMMUNITY
End: 2019-01-21 | Stop reason: ALTCHOICE

## 2018-12-05 RX ORDER — METRONIDAZOLE 500 MG/100ML
500 INJECTION, SOLUTION INTRAVENOUS ONCE
Status: CANCELLED | OUTPATIENT
Start: 2018-12-05 | End: 2018-12-05

## 2018-12-05 RX ORDER — FEXOFENADINE HCL 180 MG/1
180 TABLET ORAL AS NEEDED
COMMUNITY

## 2018-12-05 RX ORDER — HEPARIN SODIUM 5000 [USP'U]/ML
5000 INJECTION, SOLUTION INTRAVENOUS; SUBCUTANEOUS ONCE
Status: CANCELLED | OUTPATIENT
Start: 2018-12-05 | End: 2018-12-05

## 2018-12-07 RX ORDER — NEOMYCIN SULFATE 500 MG/1
500 TABLET ORAL
Status: ON HOLD | COMMUNITY
End: 2018-12-14

## 2018-12-08 ENCOUNTER — LABORATORY ENCOUNTER (OUTPATIENT)
Dept: LAB | Facility: HOSPITAL | Age: 53
End: 2018-12-08
Attending: SURGERY
Payer: COMMERCIAL

## 2018-12-08 DIAGNOSIS — C18.1: ICD-10-CM

## 2018-12-08 PROCEDURE — 86901 BLOOD TYPING SEROLOGIC RH(D): CPT

## 2018-12-08 PROCEDURE — 86850 RBC ANTIBODY SCREEN: CPT

## 2018-12-08 PROCEDURE — 86900 BLOOD TYPING SEROLOGIC ABO: CPT

## 2018-12-10 ENCOUNTER — TELEPHONE (OUTPATIENT)
Dept: SURGERY | Facility: CLINIC | Age: 53
End: 2018-12-10

## 2018-12-10 ENCOUNTER — ANESTHESIA EVENT (OUTPATIENT)
Dept: SURGERY | Facility: HOSPITAL | Age: 53
DRG: 330 | End: 2018-12-10
Payer: COMMERCIAL

## 2018-12-10 NOTE — TELEPHONE ENCOUNTER
Returned wife's message regarding pre-op instructions for tomorrow's surgery. States that person from PAT only reviewed flagyl and bowel prep instruction and did not have neomycin listed.  Wife called back to discuss again with PAT and at that time was conf

## 2018-12-11 ENCOUNTER — HOSPITAL ENCOUNTER (INPATIENT)
Facility: HOSPITAL | Age: 53
LOS: 3 days | Discharge: HOME | DRG: 330 | End: 2018-12-14
Attending: SURGERY | Admitting: SURGERY
Payer: COMMERCIAL

## 2018-12-11 ENCOUNTER — ANESTHESIA (OUTPATIENT)
Dept: SURGERY | Facility: HOSPITAL | Age: 53
DRG: 330 | End: 2018-12-11
Payer: COMMERCIAL

## 2018-12-11 DIAGNOSIS — C18.1 PRIMARY MUCINOUS ADENOCARCINOMA OF APPENDIX (HCC): ICD-10-CM

## 2018-12-11 DIAGNOSIS — C18.1: Primary | ICD-10-CM

## 2018-12-11 PROCEDURE — 99223 1ST HOSP IP/OBS HIGH 75: CPT | Performed by: HOSPITALIST

## 2018-12-11 PROCEDURE — 0DBW0ZZ EXCISION OF PERITONEUM, OPEN APPROACH: ICD-10-PCS | Performed by: SURGERY

## 2018-12-11 PROCEDURE — 0DBB0ZZ EXCISION OF ILEUM, OPEN APPROACH: ICD-10-PCS | Performed by: SURGERY

## 2018-12-11 PROCEDURE — 0DBU0ZZ EXCISION OF OMENTUM, OPEN APPROACH: ICD-10-PCS | Performed by: SURGERY

## 2018-12-11 PROCEDURE — 3E0M705 INTRODUCTION OF OTHER ANTINEOPLASTIC INTO PERITONEAL CAVITY, VIA NATURAL OR ARTIFICIAL OPENING: ICD-10-PCS | Performed by: SURGERY

## 2018-12-11 PROCEDURE — 0DBE0ZZ EXCISION OF LARGE INTESTINE, OPEN APPROACH: ICD-10-PCS | Performed by: SURGERY

## 2018-12-11 RX ORDER — HYDROMORPHONE HYDROCHLORIDE 1 MG/ML
INJECTION, SOLUTION INTRAMUSCULAR; INTRAVENOUS; SUBCUTANEOUS
Status: COMPLETED
Start: 2018-12-11 | End: 2018-12-11

## 2018-12-11 RX ORDER — MEPERIDINE HYDROCHLORIDE 25 MG/ML
12.5 INJECTION INTRAMUSCULAR; INTRAVENOUS; SUBCUTANEOUS AS NEEDED
Status: DISCONTINUED | OUTPATIENT
Start: 2018-12-11 | End: 2018-12-11 | Stop reason: HOSPADM

## 2018-12-11 RX ORDER — ACETAMINOPHEN 10 MG/ML
1000 INJECTION, SOLUTION INTRAVENOUS EVERY 6 HOURS
Status: DISCONTINUED | OUTPATIENT
Start: 2018-12-12 | End: 2018-12-12

## 2018-12-11 RX ORDER — BUPIVACAINE HYDROCHLORIDE 2.5 MG/ML
INJECTION, SOLUTION INFILTRATION; PERINEURAL AS NEEDED
Status: DISCONTINUED | OUTPATIENT
Start: 2018-12-11 | End: 2018-12-11

## 2018-12-11 RX ORDER — SODIUM CHLORIDE, SODIUM LACTATE, POTASSIUM CHLORIDE, CALCIUM CHLORIDE 600; 310; 30; 20 MG/100ML; MG/100ML; MG/100ML; MG/100ML
INJECTION, SOLUTION INTRAVENOUS CONTINUOUS
Status: DISCONTINUED | OUTPATIENT
Start: 2018-12-11 | End: 2018-12-11 | Stop reason: HOSPADM

## 2018-12-11 RX ORDER — ALBUTEROL SULFATE 90 UG/1
2 AEROSOL, METERED RESPIRATORY (INHALATION) EVERY 6 HOURS PRN
Status: DISCONTINUED | OUTPATIENT
Start: 2018-12-11 | End: 2018-12-14

## 2018-12-11 RX ORDER — LEVOTHYROXINE SODIUM 0.1 MG/1
100 TABLET ORAL
Status: DISCONTINUED | OUTPATIENT
Start: 2018-12-12 | End: 2018-12-14

## 2018-12-11 RX ORDER — ALPRAZOLAM 0.5 MG/1
0.5 TABLET ORAL EVERY 6 HOURS
Status: DISCONTINUED | OUTPATIENT
Start: 2018-12-11 | End: 2018-12-11

## 2018-12-11 RX ORDER — GABAPENTIN 300 MG/1
300 CAPSULE ORAL ONCE
Status: COMPLETED | OUTPATIENT
Start: 2018-12-11 | End: 2018-12-11

## 2018-12-11 RX ORDER — ENOXAPARIN SODIUM 100 MG/ML
40 INJECTION SUBCUTANEOUS DAILY
Status: DISCONTINUED | OUTPATIENT
Start: 2018-12-12 | End: 2018-12-14

## 2018-12-11 RX ORDER — LOSARTAN POTASSIUM 50 MG/1
50 TABLET ORAL DAILY
Status: DISCONTINUED | OUTPATIENT
Start: 2018-12-12 | End: 2018-12-14

## 2018-12-11 RX ORDER — ONDANSETRON 2 MG/ML
4 INJECTION INTRAMUSCULAR; INTRAVENOUS AS NEEDED
Status: DISCONTINUED | OUTPATIENT
Start: 2018-12-11 | End: 2018-12-11 | Stop reason: HOSPADM

## 2018-12-11 RX ORDER — SODIUM CHLORIDE, SODIUM LACTATE, POTASSIUM CHLORIDE, CALCIUM CHLORIDE 600; 310; 30; 20 MG/100ML; MG/100ML; MG/100ML; MG/100ML
INJECTION, SOLUTION INTRAVENOUS CONTINUOUS
Status: DISCONTINUED | OUTPATIENT
Start: 2018-12-11 | End: 2018-12-13

## 2018-12-11 RX ORDER — PREDNISONE 10 MG/1
10 TABLET ORAL DAILY
Status: DISCONTINUED | OUTPATIENT
Start: 2018-12-12 | End: 2018-12-14

## 2018-12-11 RX ORDER — HYDROMORPHONE HYDROCHLORIDE 1 MG/ML
0.4 INJECTION, SOLUTION INTRAMUSCULAR; INTRAVENOUS; SUBCUTANEOUS EVERY 5 MIN PRN
Status: DISCONTINUED | OUTPATIENT
Start: 2018-12-11 | End: 2018-12-11 | Stop reason: HOSPADM

## 2018-12-11 RX ORDER — PRAVASTATIN SODIUM 20 MG
20 TABLET ORAL NIGHTLY
Status: DISCONTINUED | OUTPATIENT
Start: 2018-12-11 | End: 2018-12-14

## 2018-12-11 RX ORDER — HEPARIN SODIUM 5000 [USP'U]/ML
5000 INJECTION, SOLUTION INTRAVENOUS; SUBCUTANEOUS ONCE
Status: COMPLETED | OUTPATIENT
Start: 2018-12-11 | End: 2018-12-11

## 2018-12-11 RX ORDER — ACETAMINOPHEN 500 MG
1000 TABLET ORAL EVERY 6 HOURS
Status: DISCONTINUED | OUTPATIENT
Start: 2018-12-11 | End: 2018-12-11

## 2018-12-11 RX ORDER — ONDANSETRON 2 MG/ML
4 INJECTION INTRAMUSCULAR; INTRAVENOUS EVERY 6 HOURS PRN
Status: DISCONTINUED | OUTPATIENT
Start: 2018-12-11 | End: 2018-12-14

## 2018-12-11 RX ORDER — ACETAMINOPHEN 500 MG
1000 TABLET ORAL EVERY 6 HOURS PRN
Status: ON HOLD | COMMUNITY
End: 2018-12-14

## 2018-12-11 RX ORDER — MIDAZOLAM HYDROCHLORIDE 1 MG/ML
1 INJECTION INTRAMUSCULAR; INTRAVENOUS EVERY 5 MIN PRN
Status: DISCONTINUED | OUTPATIENT
Start: 2018-12-11 | End: 2018-12-11 | Stop reason: HOSPADM

## 2018-12-11 RX ORDER — SODIUM CHLORIDE 9 MG/ML
INJECTION, SOLUTION INTRAVENOUS CONTINUOUS
Status: DISCONTINUED | OUTPATIENT
Start: 2018-12-11 | End: 2018-12-14

## 2018-12-11 RX ORDER — FAMOTIDINE 20 MG/1
20 TABLET ORAL 2 TIMES DAILY
Status: DISCONTINUED | OUTPATIENT
Start: 2018-12-11 | End: 2018-12-14

## 2018-12-11 RX ORDER — METOCLOPRAMIDE HYDROCHLORIDE 5 MG/ML
10 INJECTION INTRAMUSCULAR; INTRAVENOUS EVERY 6 HOURS PRN
Status: DISCONTINUED | OUTPATIENT
Start: 2018-12-11 | End: 2018-12-14

## 2018-12-11 RX ORDER — HYDROCODONE BITARTRATE AND ACETAMINOPHEN 5; 325 MG/1; MG/1
1 TABLET ORAL AS NEEDED
Status: DISCONTINUED | OUTPATIENT
Start: 2018-12-11 | End: 2018-12-11 | Stop reason: HOSPADM

## 2018-12-11 RX ORDER — CETIRIZINE HYDROCHLORIDE 10 MG/1
10 TABLET ORAL DAILY
Status: DISCONTINUED | OUTPATIENT
Start: 2018-12-11 | End: 2018-12-14

## 2018-12-11 RX ORDER — METRONIDAZOLE 500 MG/100ML
500 INJECTION, SOLUTION INTRAVENOUS ONCE
Status: COMPLETED | OUTPATIENT
Start: 2018-12-11 | End: 2018-12-11

## 2018-12-11 RX ORDER — HYDROCODONE BITARTRATE AND ACETAMINOPHEN 10; 325 MG/1; MG/1
1 TABLET ORAL EVERY 6 HOURS PRN
Status: DISCONTINUED | OUTPATIENT
Start: 2018-12-11 | End: 2018-12-11 | Stop reason: ALTCHOICE

## 2018-12-11 RX ORDER — HYDROCODONE BITARTRATE AND ACETAMINOPHEN 5; 325 MG/1; MG/1
2 TABLET ORAL AS NEEDED
Status: DISCONTINUED | OUTPATIENT
Start: 2018-12-11 | End: 2018-12-11 | Stop reason: HOSPADM

## 2018-12-11 RX ORDER — LABETALOL HYDROCHLORIDE 5 MG/ML
5 INJECTION, SOLUTION INTRAVENOUS EVERY 5 MIN PRN
Status: DISCONTINUED | OUTPATIENT
Start: 2018-12-11 | End: 2018-12-11 | Stop reason: HOSPADM

## 2018-12-11 RX ORDER — ACETAMINOPHEN 500 MG
1000 TABLET ORAL ONCE
Status: DISCONTINUED | OUTPATIENT
Start: 2018-12-11 | End: 2018-12-11 | Stop reason: HOSPADM

## 2018-12-11 RX ORDER — FAMOTIDINE 10 MG/ML
20 INJECTION, SOLUTION INTRAVENOUS 2 TIMES DAILY
Status: DISCONTINUED | OUTPATIENT
Start: 2018-12-11 | End: 2018-12-13

## 2018-12-11 RX ORDER — MORPHINE SULFATE 4 MG/ML
2 INJECTION, SOLUTION INTRAMUSCULAR; INTRAVENOUS EVERY 5 MIN PRN
Status: DISCONTINUED | OUTPATIENT
Start: 2018-12-11 | End: 2018-12-11 | Stop reason: HOSPADM

## 2018-12-11 RX ORDER — NALOXONE HYDROCHLORIDE 0.4 MG/ML
80 INJECTION, SOLUTION INTRAMUSCULAR; INTRAVENOUS; SUBCUTANEOUS AS NEEDED
Status: DISCONTINUED | OUTPATIENT
Start: 2018-12-11 | End: 2018-12-11 | Stop reason: HOSPADM

## 2018-12-11 RX ORDER — FENOFIBRATE 67 MG/1
67 CAPSULE ORAL
Status: DISCONTINUED | OUTPATIENT
Start: 2018-12-12 | End: 2018-12-14

## 2018-12-11 RX ORDER — MAGNESIUM HYDROXIDE 1200 MG/15ML
LIQUID ORAL CONTINUOUS PRN
Status: COMPLETED | OUTPATIENT
Start: 2018-12-11 | End: 2018-12-11

## 2018-12-11 NOTE — ANESTHESIA POSTPROCEDURE EVALUATION
[address] Patient Status:  Surgery Admit   Age/Gender 52year old male MRN [id number]   [facility] SURGERY Attending Amberly Castro MD   [address] Day # 0 PCP Dale Dias DO       Anesthesia Post-op Note    Procedur

## 2018-12-11 NOTE — ANESTHESIA PREPROCEDURE EVALUATION
PRE-OP EVALUATION    Patient Name: Dian Lacy    Pre-op Diagnosis: Primary mucinous adenocarcinoma of appendix ([facility] [address] [C18.1]    Procedure(s):  Laparoscopic, possible open, Cytoreductive suregery, possible multi organ resection, hyperthermic intrape Laterality Date   • APPENDECTOMY  11/12/2018    interval appey   • CHOLECYSTECTOMY  2008   • HERNIA SURGERY  28/96/8539    umbilical   • LAPAROSCOPIC APPENDECTOMY N/A 11/12/2018    Performed by Sulma Mcdaniel MD at [facility] damage, sore throat and adverse reactions related to medications administered. Questions answered and patient wishes to proceed.        Present on Admission:  **None**

## 2018-12-11 NOTE — INTERVAL H&P NOTE
There has been no significant change since the patient was seen as documented in EPIC. Surgery revisted. To proceed as planned.       Adriana WOMACK, PAVenkateshC

## 2018-12-11 NOTE — BRIEF OP NOTE
Pre-Operative Diagnosis: Primary mucinous adenocarcinoma of appendix ([facility] [address] [C18.1]     Post-Operative Diagnosis: Primary mucinous adenocarcinoma of appendix ([facility] [address] [C18.1]      Procedure Performed:   Laparoscopic resection of terminal ileum with partial col

## 2018-12-12 LAB
ALBUMIN SERPL-MCNC: 3.2 G/DL (ref 3.1–4.5)
ALBUMIN/GLOB SERPL: 1 {RATIO} (ref 1–2)
ALP LIVER SERPL-CCNC: 32 U/L (ref 45–117)
ALT SERPL-CCNC: 85 U/L (ref 17–63)
ANION GAP SERPL CALC-SCNC: 9 MMOL/L (ref 0–18)
AST SERPL-CCNC: 53 U/L (ref 15–41)
BILIRUB SERPL-MCNC: 0.4 MG/DL (ref 0.1–2)
BUN BLD-MCNC: 21 MG/DL (ref 8–20)
BUN/CREAT SERPL: 21 (ref 10–20)
CALCIUM BLD-MCNC: 8.1 MG/DL (ref 8.3–10.3)
CHLORIDE SERPL-SCNC: 109 MMOL/L (ref 101–111)
CO2 SERPL-SCNC: 22 MMOL/L (ref 22–32)
CREAT BLD-MCNC: 1 MG/DL (ref 0.7–1.3)
ERYTHROCYTE [DISTWIDTH] IN BLOOD BY AUTOMATED COUNT: 14.6 % (ref 11.5–16)
GLOBULIN PLAS-MCNC: 3.2 G/DL (ref 2.8–4.4)
GLUCOSE BLD-MCNC: 107 MG/DL (ref 70–99)
HAV IGM SER QL: 1.7 MG/DL (ref 1.8–2.5)
HCT VFR BLD AUTO: 36.3 % (ref 37–53)
HGB BLD-MCNC: 12.1 G/DL (ref 13–17)
M PROTEIN MFR SERPL ELPH: 6.4 G/DL (ref 6.4–8.2)
MCH RBC QN AUTO: 28.8 PG (ref 27–33.2)
MCHC RBC AUTO-ENTMCNC: 33.3 G/DL (ref 31–37)
MCV RBC AUTO: 86.4 FL (ref 80–99)
OSMOLALITY SERPL CALC.SUM OF ELEC: 293 MOSM/KG (ref 275–295)
PHOSPHATE SERPL-MCNC: 3 MG/DL (ref 2.5–4.9)
PLATELET # BLD AUTO: 160 10(3)UL (ref 150–450)
POTASSIUM SERPL-SCNC: 3.6 MMOL/L (ref 3.6–5.1)
RBC # BLD AUTO: 4.2 X10(6)UL (ref 4.3–5.7)
RED CELL DISTRIBUTION WIDTH-SD: 46.2 FL (ref 35.1–46.3)
SODIUM SERPL-SCNC: 140 MMOL/L (ref 136–144)
WBC # BLD AUTO: 7.9 X10(3) UL (ref 4–13)

## 2018-12-12 PROCEDURE — 99232 SBSQ HOSP IP/OBS MODERATE 35: CPT | Performed by: STUDENT IN AN ORGANIZED HEALTH CARE EDUCATION/TRAINING PROGRAM

## 2018-12-12 RX ORDER — OXYCODONE HYDROCHLORIDE 5 MG/1
5 TABLET ORAL EVERY 4 HOURS PRN
Status: DISCONTINUED | OUTPATIENT
Start: 2018-12-12 | End: 2018-12-14

## 2018-12-12 RX ORDER — MAGNESIUM SULFATE HEPTAHYDRATE 40 MG/ML
2 INJECTION, SOLUTION INTRAVENOUS ONCE
Status: COMPLETED | OUTPATIENT
Start: 2018-12-12 | End: 2018-12-12

## 2018-12-12 RX ORDER — ACETAMINOPHEN 500 MG
1000 TABLET ORAL EVERY 6 HOURS
Status: DISCONTINUED | OUTPATIENT
Start: 2018-12-12 | End: 2018-12-14

## 2018-12-12 NOTE — CM/SW NOTE
Pt was screened during rounds and no needs are identified at this time. RN to contact SW/CM if needs arise.       12/12/18 1500   CM/SW Screening   Referral Source Social Work (self-referral)   Information Source Nursing rounds   Patient's Mental Status Al

## 2018-12-12 NOTE — PROGRESS NOTES
POD #1 s/p CRS/HIPEC    Patient had 2 episodes of emesis yesterday after drinking too much  No N/V this AM. Tolerating clear liquids  Pain controlled  No fever or chills    /58 (BP Location: Left arm)   Pulse 66   Temp 98 °F (36.7 °C) (Oral)   Resp 1

## 2018-12-12 NOTE — CONSULTS
[facility]  [facility] Patient Status:  Inpatient    1965 MRN [id number]   [facility] 7NE-A Attending Teena Maxwell MD   Hosp Day # 0 PCP Adan Paris DO     Reason for consult: Medical management COMMENTS)    Comment:Fried eggs cause lip numbness    Medications:    No current facility-administered medications on file prior to encounter.    Current Outpatient Medications on File Prior to Encounter:  acetaminophen 500 MG Oral Tab Take 1,000 mg by mout distress. Alert and oriented x 3. HEENT: Normocephalic atraumatic. Moist mucous membranes. EOM-I. PERRLA. Anicteric. Neck: No lymphadenopathy. No JVD. No carotid bruits. Respiratory: Clear to auscultation bilaterally. No wheezes. No rhonchi.   Cardiovasc

## 2018-12-12 NOTE — DIETARY NOTE
[facility]    NUTRITION INITIAL ASSESSMENT     Pt does not meet malnutrition criteria.     NUTRITION DIAGNOSIS/PROBLEM:    Inadequate oral intake related to inability to consume sufficient po as evidenced by pt s/p CRS/HIPEC    NUTRITION INTERVENTION: ~1 ml/kcal or per MD discretion    MONITOR AND EVALUATE/NUTRITION GOALS:  1. PO intake to meet at least 75% patient nutrition prescription  3. No signs of skin breakdown  4.  Maintain lean body mass    MEDICATIONS:  Noted    LABS:  Noted    Pt is at [employer]

## 2018-12-12 NOTE — PROGRESS NOTES
[facility]  Progress Note     [facility] Patient Status:  Inpatient    1965 MRN [id number]   [facility] 7NE-A Attending Hal Soto MD   [facility] Day # 1 PCP Tavia Rodgers DO     Chief Complaint: Gil Pisano    S: Jonathan Cruz • cetirizine  10 mg Oral Daily   • Levothyroxine Sodium  100 mcg Oral Before breakfast   • Losartan Potassium  50 mg Oral Daily   • Pravastatin Sodium  20 mg Oral Nightly   • predniSONE  10 mg Oral Daily       ASSESSMENT / PLAN:     1.  Primary mucinous a

## 2018-12-12 NOTE — PLAN OF CARE
Assumed patient care 0730. Patient alert/oriented x4. VSS during shift. Oxygen maintained on RA. Abd incision x4 w/aquacel dressing, C/D/I. No gas. Pain managed with PCA and prn oxy. Patient ambulating to BR, voiding. Diet clear liquids w. some nausea.  Man

## 2018-12-12 NOTE — PLAN OF CARE
Assumed care at [address] [facility]. Pt a/ox4. VSS, NSR per tele. RA. Pain controlled w/ pca dialudid and scheduled iv tylenol. Had emesis x2. Dr. Phill Hernandez aware. Zofran iv prn and reglan iv prn given per order. Abd incision x4 w/ aquacel drsg, C/D/I. Hypoactive BS.  Leilani Irvin

## 2018-12-12 NOTE — PLAN OF CARE
Report received from [address] [place] [address], PACU. Patient transferred via cart to 7th floor. Received patient A/O x4. Oxygen weaned to RA, no c/o SOB. Patient does not normally wear O2. C/o nausea with emesis x2.  Saldivar in place, orders to remove in the AM. Pain managed wi

## 2018-12-13 LAB
ALBUMIN SERPL-MCNC: 3.1 G/DL (ref 3.1–4.5)
ALBUMIN/GLOB SERPL: 1 {RATIO} (ref 1–2)
ALP LIVER SERPL-CCNC: 32 U/L (ref 45–117)
ALT SERPL-CCNC: 61 U/L (ref 17–63)
ANION GAP SERPL CALC-SCNC: 5 MMOL/L (ref 0–18)
AST SERPL-CCNC: 31 U/L (ref 15–41)
BASOPHILS # BLD AUTO: 0.03 X10(3) UL (ref 0–0.1)
BASOPHILS NFR BLD AUTO: 0.4 %
BILIRUB SERPL-MCNC: 0.4 MG/DL (ref 0.1–2)
BUN BLD-MCNC: 11 MG/DL (ref 8–20)
BUN/CREAT SERPL: 13.8 (ref 10–20)
CALCIUM BLD-MCNC: 8.1 MG/DL (ref 8.3–10.3)
CHLORIDE SERPL-SCNC: 108 MMOL/L (ref 101–111)
CO2 SERPL-SCNC: 26 MMOL/L (ref 22–32)
CREAT BLD-MCNC: 0.8 MG/DL (ref 0.7–1.3)
EOSINOPHIL # BLD AUTO: 0.18 X10(3) UL (ref 0–0.3)
EOSINOPHIL NFR BLD AUTO: 2.3 %
ERYTHROCYTE [DISTWIDTH] IN BLOOD BY AUTOMATED COUNT: 14.2 % (ref 11.5–16)
GLOBULIN PLAS-MCNC: 3.1 G/DL (ref 2.8–4.4)
GLUCOSE BLD-MCNC: 103 MG/DL (ref 70–99)
HAV IGM SER QL: 2.2 MG/DL (ref 1.8–2.5)
HCT VFR BLD AUTO: 36.6 % (ref 37–53)
HGB BLD-MCNC: 11.8 G/DL (ref 13–17)
IMMATURE GRANULOCYTE COUNT: 0.03 X10(3) UL (ref 0–1)
IMMATURE GRANULOCYTE RATIO %: 0.4 %
LYMPHOCYTES # BLD AUTO: 1.41 X10(3) UL (ref 0.9–4)
LYMPHOCYTES NFR BLD AUTO: 17.8 %
M PROTEIN MFR SERPL ELPH: 6.2 G/DL (ref 6.4–8.2)
MCH RBC QN AUTO: 28.1 PG (ref 27–33.2)
MCHC RBC AUTO-ENTMCNC: 32.2 G/DL (ref 31–37)
MCV RBC AUTO: 87.1 FL (ref 80–99)
MONOCYTES # BLD AUTO: 0.58 X10(3) UL (ref 0.1–1)
MONOCYTES NFR BLD AUTO: 7.3 %
NEUTROPHIL ABS PRELIM: 5.7 X10 (3) UL (ref 1.3–6.7)
NEUTROPHILS # BLD AUTO: 5.7 X10(3) UL (ref 1.3–6.7)
NEUTROPHILS NFR BLD AUTO: 71.8 %
OSMOLALITY SERPL CALC.SUM OF ELEC: 288 MOSM/KG (ref 275–295)
PHOSPHATE SERPL-MCNC: 2.1 MG/DL (ref 2.5–4.9)
PLATELET # BLD AUTO: 159 10(3)UL (ref 150–450)
POTASSIUM SERPL-SCNC: 3.8 MMOL/L (ref 3.6–5.1)
RBC # BLD AUTO: 4.2 X10(6)UL (ref 4.3–5.7)
RED CELL DISTRIBUTION WIDTH-SD: 45.2 FL (ref 35.1–46.3)
SODIUM SERPL-SCNC: 139 MMOL/L (ref 136–144)
WBC # BLD AUTO: 7.9 X10(3) UL (ref 4–13)

## 2018-12-13 PROCEDURE — 99232 SBSQ HOSP IP/OBS MODERATE 35: CPT | Performed by: STUDENT IN AN ORGANIZED HEALTH CARE EDUCATION/TRAINING PROGRAM

## 2018-12-13 RX ORDER — HYDROMORPHONE HYDROCHLORIDE 1 MG/ML
0.5 INJECTION, SOLUTION INTRAMUSCULAR; INTRAVENOUS; SUBCUTANEOUS EVERY 2 HOUR PRN
Status: DISCONTINUED | OUTPATIENT
Start: 2018-12-13 | End: 2018-12-14

## 2018-12-13 RX ORDER — ENOXAPARIN SODIUM 100 MG/ML
40 INJECTION SUBCUTANEOUS DAILY
Qty: 14 SYRINGE | Refills: 0 | Status: SHIPPED | OUTPATIENT
Start: 2018-12-13 | End: 2019-01-04

## 2018-12-13 RX ORDER — HYDROMORPHONE HYDROCHLORIDE 1 MG/ML
1 INJECTION, SOLUTION INTRAMUSCULAR; INTRAVENOUS; SUBCUTANEOUS EVERY 2 HOUR PRN
Status: DISCONTINUED | OUTPATIENT
Start: 2018-12-13 | End: 2018-12-14

## 2018-12-13 RX ORDER — OXYCODONE HYDROCHLORIDE 10 MG/1
10 TABLET ORAL EVERY 4 HOURS PRN
Status: DISCONTINUED | OUTPATIENT
Start: 2018-12-13 | End: 2018-12-14

## 2018-12-13 NOTE — PROGRESS NOTES
POD #2 s/p CRS/HIPEC    Doing well this AM  No N/V. tolerating clear liquids  Pain controlled  No fever, chills, chest pain, or SOB  Denies dysuria    /69 (BP Location: Left arm)   Pulse 59   Temp 97.9 °F (36.6 °C) (Oral)   Resp 15   Ht 1.727 m (5' 8

## 2018-12-13 NOTE — PROGRESS NOTES
[facility]  Progress Note     [address] [facility] Patient Status:  Inpatient    1965 MRN [id number]   [facility] 7NE-A Attending Niesha Dan MD   [address] Day # 2 PCP Ethan Maier DO     Chief Complaint: Jennifer Hernandez    S: Franny Liu 67 mg Oral Daily with breakfast   • cetirizine  10 mg Oral Daily   • Levothyroxine Sodium  100 mcg Oral Before breakfast   • Losartan Potassium  50 mg Oral Daily   • Pravastatin Sodium  20 mg Oral Nightly   • predniSONE  10 mg Oral Daily       ASSESSMENT / (3) assistive equipment and person

## 2018-12-13 NOTE — PLAN OF CARE
Assumed care at 0730  A&Ox4, VSS, NSR on tele  Ambulation and IS encouraged  Incision dressings C/D/I  Hypoactive bowel sounds. Denies passing gas  Mild nausea, relief with zofran  Tolerating full liquid diet.  Upgrade to soft diet tomorrow AM  Pain control

## 2018-12-13 NOTE — PLAN OF CARE
Assumed care at [facility]. Pt a/ox4. VSS, NSR per tele. RA. Pain controlled w/ pca dilaudid, oxy prn and scheduled tylenol po. Abd incision x4 w/ aquacel in place C/D/I. Hypoactive BS, denies flatus. Voids. Encourage to use IS and ambulate.   Pt and spouse

## 2018-12-13 NOTE — OPERATIVE REPORT
[facility]    PATIENT'S NAME: Xavier Rivera   ATTENDING PHYSICIAN: Shamika Boland MD   OPERATING PHYSICIAN: Shamika Boland MD   PATIENT ACCOUNT#:   [id number]    LOCATION:  [address] [place] [address]  MEDICAL RECORD #:   [id number]       DATE OF BIRTH: Bre, and under visualization, two 12 mm trocars were placed on either side of the abdomen with subsequent additional 5 mm trocar on the left side. Evaluation of the abdominal cavity revealed no evidence for metastatic disease by laparoscopy.   Dangelo Alcantara undertaken on the right side. The colon was then divided at the transverse colon level using SANJEEV stapler. The terminal ileum was then divided using SANJEEV stapler. The mesentery was then divided using Harmonic scalpel or endovascular SANJEEV stapler.   The spec within the circuit for 60 minutes. An additional 10 mg were then instilled for an additional 30 minutes; thus, a total of 40 mg of Mitomycin-C were instilled for a total heated chemoperfusion time of 90 minutes.   The system was then flushed using 2 L lact

## 2018-12-13 NOTE — PHYSICAL THERAPY NOTE
PHYSICAL THERAPY EVALUATION - INPATIENT     Room Number: 1998/4062-A  Evaluation Date: 12/13/2018  Type of Evaluation: Initial  Physician Order: PT Eval and Treat    Presenting Problem: HIPEC  Reason for Therapy: Mobility Dysfunction and Discharge Pl on break. They have 10 month old puppy and 15 yo dog at home    SUBJECTIVE  Pt cooperative, ornery    Patient self-stated goal is go home    OBJECTIVE  Precautions:  Other (Comment)(surgical)  Fall Risk: Standard fall risk    WEIGHT BEARING RESTRICTION  Tish Guy surgical precautions. Pt performed supine to sit with supervision, cues for log rolling technique. Pt performed sit to stand with supervision> [facility], [facility], cues for pacing, upright posturing.  Pt with fast randy and somewhat a Established Goals: 2      CURRENT GOALS    Goal #1 Patient is able to demonstrate supine - sit EOB @ level: modified independent     Goal #2 Patient is able to demonstrate transfers Sit to/from Stand at assistance level: modified independent     Goal #3 Pa

## 2018-12-14 VITALS
OXYGEN SATURATION: 93 % | DIASTOLIC BLOOD PRESSURE: 77 MMHG | TEMPERATURE: 98 F | HEART RATE: 73 BPM | SYSTOLIC BLOOD PRESSURE: 165 MMHG | BODY MASS INDEX: 32.07 KG/M2 | HEIGHT: 68 IN | WEIGHT: 211.63 LBS | RESPIRATION RATE: 24 BRPM

## 2018-12-14 LAB
ALBUMIN SERPL-MCNC: 3.2 G/DL (ref 3.1–4.5)
ALBUMIN/GLOB SERPL: 0.8 {RATIO} (ref 1–2)
ALP LIVER SERPL-CCNC: 47 U/L (ref 45–117)
ALT SERPL-CCNC: 51 U/L (ref 17–63)
ANION GAP SERPL CALC-SCNC: 6 MMOL/L (ref 0–18)
AST SERPL-CCNC: 20 U/L (ref 15–41)
BILIRUB SERPL-MCNC: 0.4 MG/DL (ref 0.1–2)
BUN BLD-MCNC: 10 MG/DL (ref 8–20)
BUN/CREAT SERPL: 10.1 (ref 10–20)
CALCIUM BLD-MCNC: 8.7 MG/DL (ref 8.3–10.3)
CHLORIDE SERPL-SCNC: 106 MMOL/L (ref 101–111)
CO2 SERPL-SCNC: 26 MMOL/L (ref 22–32)
CREAT BLD-MCNC: 0.99 MG/DL (ref 0.7–1.3)
ERYTHROCYTE [DISTWIDTH] IN BLOOD BY AUTOMATED COUNT: 14 % (ref 11.5–16)
GLOBULIN PLAS-MCNC: 4 G/DL (ref 2.8–4.4)
GLUCOSE BLD-MCNC: 104 MG/DL (ref 70–99)
HAV IGM SER QL: 2.2 MG/DL (ref 1.8–2.5)
HCT VFR BLD AUTO: 39.1 % (ref 37–53)
HGB BLD-MCNC: 12.7 G/DL (ref 13–17)
M PROTEIN MFR SERPL ELPH: 7.2 G/DL (ref 6.4–8.2)
MCH RBC QN AUTO: 27.9 PG (ref 27–33.2)
MCHC RBC AUTO-ENTMCNC: 32.5 G/DL (ref 31–37)
MCV RBC AUTO: 85.9 FL (ref 80–99)
OSMOLALITY SERPL CALC.SUM OF ELEC: 285 MOSM/KG (ref 275–295)
PHOSPHATE SERPL-MCNC: 2.2 MG/DL (ref 2.5–4.9)
PLATELET # BLD AUTO: 176 10(3)UL (ref 150–450)
POTASSIUM SERPL-SCNC: 4.1 MMOL/L (ref 3.6–5.1)
RBC # BLD AUTO: 4.55 X10(6)UL (ref 4.3–5.7)
RED CELL DISTRIBUTION WIDTH-SD: 44.3 FL (ref 35.1–46.3)
SODIUM SERPL-SCNC: 138 MMOL/L (ref 136–144)
WBC # BLD AUTO: 8.6 X10(3) UL (ref 4–13)

## 2018-12-14 PROCEDURE — 99232 SBSQ HOSP IP/OBS MODERATE 35: CPT | Performed by: STUDENT IN AN ORGANIZED HEALTH CARE EDUCATION/TRAINING PROGRAM

## 2018-12-14 RX ORDER — ACETAMINOPHEN 500 MG
1000 TABLET ORAL EVERY 6 HOURS PRN
Qty: 30 TABLET | Refills: 0 | Status: SHIPPED | OUTPATIENT
Start: 2018-12-14 | End: 2019-01-04

## 2018-12-14 RX ORDER — PSEUDOEPHEDRINE HCL 30 MG
100 TABLET ORAL 2 TIMES DAILY
Qty: 20 CAPSULE | Refills: 0 | Status: SHIPPED | OUTPATIENT
Start: 2018-12-14 | End: 2019-01-04

## 2018-12-14 RX ORDER — OXYCODONE HYDROCHLORIDE 5 MG/1
5 TABLET ORAL EVERY 4 HOURS PRN
Status: DISCONTINUED | OUTPATIENT
Start: 2018-12-14 | End: 2018-12-14

## 2018-12-14 RX ORDER — DOCUSATE SODIUM 100 MG/1
100 CAPSULE, LIQUID FILLED ORAL 2 TIMES DAILY
Status: DISCONTINUED | OUTPATIENT
Start: 2018-12-14 | End: 2018-12-14

## 2018-12-14 RX ORDER — OXYCODONE HYDROCHLORIDE 15 MG/1
15 TABLET ORAL
Qty: 30 TABLET | Refills: 0 | Status: ON HOLD | OUTPATIENT
Start: 2018-12-14 | End: 2018-12-21

## 2018-12-14 NOTE — PLAN OF CARE
A/OX4, RA, VSS, NSR per Tele  Abdominal incisional dressings c/d/i  C/o incisional pain- pt states pain is managed w/ Oxy 10 and 5mg   Abdomen soft, non distended  No c/o nausea, bloating   Poor appetite, no BM, no gas   IS encouraged   Call light w/i reac

## 2018-12-14 NOTE — PLAN OF CARE
Patient awake, alert and oriented x4  Telemetry, Sinus Rhythm   C/o abdominal incisional tenderness, increased pain intensity with activity/deep breathing exercises and coughing  Pain control with scheduled ES tylenol and PRN oxycodone   Education reinfor

## 2018-12-14 NOTE — PROGRESS NOTES
[facility]  Progress Note     [facility] Patient Status:  Inpatient    1965 MRN [id number]   [facility] 7NE-A Attending Leighton Arellano MD   [facility] Day # 3 PCP Sarah Lambert DO     Chief Complaint: Annemarie Sinclair    S: Lux Blackwood • famoTIDine  20 mg Oral BID   • Fluticasone Furoate-Vilanterol  1 puff Inhalation Daily   • fenofibrate micronized  67 mg Oral Daily with breakfast   • cetirizine  10 mg Oral Daily   • Levothyroxine Sodium  100 mcg Oral Before breakfast   • Losartan Pot

## 2018-12-14 NOTE — DIETARY NOTE
1230 [facility] ASSESSMENT     Pt does not meet malnutrition criteria.     NUTRITION DIAGNOSIS/PROBLEM:    Inadequate oral intake related to inability to consume sufficient po as evidenced by pt s/p CRS/HIPEC    NUTRITION INTERVENTION Encounters:  12/14/18 : 96 kg (211 lb 10.3 oz)  12/03/18 : 98 kg (216 lb)  11/19/18 : 99.3 kg (219 lb)  11/16/18 : 101.6 kg (224 lb)  11/12/18 : 102.9 kg (226 lb 13.7 oz)  11/06/18 : 99.8 kg (220 lb)      NUTRITION:  Diet: low fiber  Oral Supplements: enli

## 2018-12-14 NOTE — PROGRESS NOTES
POD #3 s/p CRS/HIPEC    Doing well this AM  No N/V. tolerating full liquids  Some difficulty maintaining pain control  No fever, chills, chest pain, or SOB    /67 (BP Location: Left arm)   Pulse 82   Temp 97.8 °F (36.6 °C) (Oral)   Resp 18   Ht 1.727

## 2018-12-14 NOTE — PHYSICAL THERAPY NOTE
Attempted to see Pt this PM - RN aware of attempt. Discussed with Pt safe mechanics for stairs. Pt able to verbalize and in agreement with activity recommendations. Pt up ambulating sans AD. Will sign-off at this time. RN aware.

## 2018-12-15 ENCOUNTER — TELEPHONE (OUTPATIENT)
Dept: SURGERY | Facility: CLINIC | Age: 53
End: 2018-12-15

## 2018-12-15 DIAGNOSIS — Z87.898 HISTORY OF POSTOPERATIVE NAUSEA: Primary | ICD-10-CM

## 2018-12-15 LAB — BLOOD TYPE BARCODE: 5100

## 2018-12-15 RX ORDER — ONDANSETRON 4 MG/1
4 TABLET, ORALLY DISINTEGRATING ORAL EVERY 8 HOURS PRN
Qty: 20 TABLET | Refills: 0 | Status: SHIPPED | OUTPATIENT
Start: 2018-12-15 | End: 2019-01-04

## 2018-12-15 NOTE — PLAN OF CARE
NURSING DISCHARGE NOTE    Discharged Home via Wheelchair. Accompanied by Friend and Support staff  Belongings Taken by patient/family. AVS Discharge paperwork completed and reviewed with patient and significant other, Alejandra at bedside.    Reinforce

## 2018-12-16 NOTE — TELEPHONE ENCOUNTER
Liz Dyson called back, patient had one episode of bilious emesis. Passing gas now. Will make arrangements for early postop visit tomorrow.

## 2018-12-17 ENCOUNTER — HOSPITAL ENCOUNTER (INPATIENT)
Facility: HOSPITAL | Age: 53
LOS: 3 days | Discharge: HOME | DRG: 908 | End: 2018-12-21
Attending: SURGERY | Admitting: SURGERY
Payer: COMMERCIAL

## 2018-12-17 ENCOUNTER — TELEPHONE (OUTPATIENT)
Dept: SURGERY | Facility: CLINIC | Age: 53
End: 2018-12-17

## 2018-12-17 ENCOUNTER — ANESTHESIA EVENT (OUTPATIENT)
Dept: SURGERY | Facility: HOSPITAL | Age: 53
End: 2018-12-17

## 2018-12-17 ENCOUNTER — ANESTHESIA (OUTPATIENT)
Dept: SURGERY | Facility: HOSPITAL | Age: 53
End: 2018-12-17

## 2018-12-17 ENCOUNTER — OFFICE VISIT (OUTPATIENT)
Dept: SURGERY | Facility: CLINIC | Age: 53
End: 2018-12-17
Payer: COMMERCIAL

## 2018-12-17 VITALS
DIASTOLIC BLOOD PRESSURE: 81 MMHG | OXYGEN SATURATION: 97 % | BODY MASS INDEX: 31.22 KG/M2 | WEIGHT: 206 LBS | HEIGHT: 68 IN | RESPIRATION RATE: 18 BRPM | SYSTOLIC BLOOD PRESSURE: 119 MMHG | TEMPERATURE: 98 F | HEART RATE: 101 BPM

## 2018-12-17 DIAGNOSIS — C18.1 PRIMARY APPENDICEAL ADENOCARCINOMA (HCC): Primary | ICD-10-CM

## 2018-12-17 DIAGNOSIS — T81.30XA ABDOMINAL WOUND DEHISCENCE, INITIAL ENCOUNTER: ICD-10-CM

## 2018-12-17 DIAGNOSIS — T81.31XA DEHISCENCE OF INCISION: Primary | ICD-10-CM

## 2018-12-17 DIAGNOSIS — C18.1: Primary | ICD-10-CM

## 2018-12-17 PROCEDURE — 05H633Z INSERTION OF INFUSION DEVICE INTO LEFT SUBCLAVIAN VEIN, PERCUTANEOUS APPROACH: ICD-10-PCS | Performed by: SURGERY

## 2018-12-17 PROCEDURE — 0JQ80ZZ REPAIR ABDOMEN SUBCUTANEOUS TISSUE AND FASCIA, OPEN APPROACH: ICD-10-PCS | Performed by: SURGERY

## 2018-12-17 PROCEDURE — 99024 POSTOP FOLLOW-UP VISIT: CPT | Performed by: SURGERY

## 2018-12-17 RX ORDER — FAMOTIDINE 20 MG/1
20 TABLET ORAL 2 TIMES DAILY
Status: DISCONTINUED | OUTPATIENT
Start: 2018-12-17 | End: 2018-12-21

## 2018-12-17 RX ORDER — HYDROCODONE BITARTRATE AND ACETAMINOPHEN 5; 325 MG/1; MG/1
1 TABLET ORAL EVERY 4 HOURS PRN
Status: DISCONTINUED | OUTPATIENT
Start: 2018-12-17 | End: 2018-12-18

## 2018-12-17 RX ORDER — ENOXAPARIN SODIUM 100 MG/ML
40 INJECTION SUBCUTANEOUS DAILY
Status: DISCONTINUED | OUTPATIENT
Start: 2018-12-18 | End: 2018-12-21

## 2018-12-17 RX ORDER — SODIUM CHLORIDE, SODIUM LACTATE, POTASSIUM CHLORIDE, CALCIUM CHLORIDE 600; 310; 30; 20 MG/100ML; MG/100ML; MG/100ML; MG/100ML
INJECTION, SOLUTION INTRAVENOUS CONTINUOUS
Status: DISCONTINUED | OUTPATIENT
Start: 2018-12-17 | End: 2018-12-20

## 2018-12-17 RX ORDER — SODIUM CHLORIDE, SODIUM LACTATE, POTASSIUM CHLORIDE, CALCIUM CHLORIDE 600; 310; 30; 20 MG/100ML; MG/100ML; MG/100ML; MG/100ML
INJECTION, SOLUTION INTRAVENOUS CONTINUOUS
Status: DISCONTINUED | OUTPATIENT
Start: 2018-12-17 | End: 2018-12-18

## 2018-12-17 RX ORDER — HYDROMORPHONE HYDROCHLORIDE 1 MG/ML
0.5 INJECTION, SOLUTION INTRAMUSCULAR; INTRAVENOUS; SUBCUTANEOUS EVERY 5 MIN PRN
Status: DISCONTINUED | OUTPATIENT
Start: 2018-12-17 | End: 2018-12-17 | Stop reason: HOSPADM

## 2018-12-17 RX ORDER — CEFAZOLIN SODIUM 1 G/3ML
INJECTION, POWDER, FOR SOLUTION INTRAMUSCULAR; INTRAVENOUS
Status: DISCONTINUED | OUTPATIENT
Start: 2018-12-17 | End: 2018-12-17 | Stop reason: HOSPADM

## 2018-12-17 RX ORDER — NALOXONE HYDROCHLORIDE 0.4 MG/ML
80 INJECTION, SOLUTION INTRAMUSCULAR; INTRAVENOUS; SUBCUTANEOUS AS NEEDED
Status: DISCONTINUED | OUTPATIENT
Start: 2018-12-17 | End: 2018-12-17 | Stop reason: HOSPADM

## 2018-12-17 RX ORDER — HYDROCODONE BITARTRATE AND ACETAMINOPHEN 5; 325 MG/1; MG/1
2 TABLET ORAL EVERY 4 HOURS PRN
Status: DISCONTINUED | OUTPATIENT
Start: 2018-12-17 | End: 2018-12-18

## 2018-12-17 RX ORDER — DIPHENHYDRAMINE HYDROCHLORIDE 50 MG/ML
12.5 INJECTION INTRAMUSCULAR; INTRAVENOUS AS NEEDED
Status: DISCONTINUED | OUTPATIENT
Start: 2018-12-17 | End: 2018-12-17 | Stop reason: HOSPADM

## 2018-12-17 RX ORDER — HYDROMORPHONE HYDROCHLORIDE 1 MG/ML
INJECTION, SOLUTION INTRAMUSCULAR; INTRAVENOUS; SUBCUTANEOUS
Status: COMPLETED
Start: 2018-12-17 | End: 2018-12-17

## 2018-12-17 RX ORDER — HYDROMORPHONE HYDROCHLORIDE 1 MG/ML
0.5 INJECTION, SOLUTION INTRAMUSCULAR; INTRAVENOUS; SUBCUTANEOUS EVERY 2 HOUR PRN
Status: DISCONTINUED | OUTPATIENT
Start: 2018-12-17 | End: 2018-12-21

## 2018-12-17 RX ORDER — ACETAMINOPHEN 500 MG
1000 TABLET ORAL ONCE
Status: DISCONTINUED | OUTPATIENT
Start: 2018-12-17 | End: 2018-12-17 | Stop reason: HOSPADM

## 2018-12-17 RX ORDER — ONDANSETRON 2 MG/ML
4 INJECTION INTRAMUSCULAR; INTRAVENOUS EVERY 6 HOURS PRN
Status: DISCONTINUED | OUTPATIENT
Start: 2018-12-17 | End: 2018-12-21

## 2018-12-17 RX ORDER — CEFAZOLIN SODIUM/WATER 2 G/20 ML
2 SYRINGE (ML) INTRAVENOUS EVERY 8 HOURS
Status: COMPLETED | OUTPATIENT
Start: 2018-12-17 | End: 2018-12-18

## 2018-12-17 RX ORDER — MEPERIDINE HYDROCHLORIDE 25 MG/ML
12.5 INJECTION INTRAMUSCULAR; INTRAVENOUS; SUBCUTANEOUS AS NEEDED
Status: DISCONTINUED | OUTPATIENT
Start: 2018-12-17 | End: 2018-12-17 | Stop reason: HOSPADM

## 2018-12-17 RX ORDER — SODIUM CHLORIDE 0.9 % (FLUSH) 0.9 %
10 SYRINGE (ML) INJECTION EVERY 12 HOURS
Status: DISCONTINUED | OUTPATIENT
Start: 2018-12-17 | End: 2018-12-21

## 2018-12-17 RX ORDER — SODIUM CHLORIDE, SODIUM LACTATE, POTASSIUM CHLORIDE, CALCIUM CHLORIDE 600; 310; 30; 20 MG/100ML; MG/100ML; MG/100ML; MG/100ML
INJECTION, SOLUTION INTRAVENOUS CONTINUOUS
Status: DISCONTINUED | OUTPATIENT
Start: 2018-12-17 | End: 2018-12-17 | Stop reason: HOSPADM

## 2018-12-17 RX ORDER — ONDANSETRON 2 MG/ML
4 INJECTION INTRAMUSCULAR; INTRAVENOUS AS NEEDED
Status: DISCONTINUED | OUTPATIENT
Start: 2018-12-17 | End: 2018-12-17 | Stop reason: HOSPADM

## 2018-12-17 RX ORDER — METOCLOPRAMIDE HYDROCHLORIDE 5 MG/ML
10 INJECTION INTRAMUSCULAR; INTRAVENOUS EVERY 8 HOURS PRN
Status: DISCONTINUED | OUTPATIENT
Start: 2018-12-17 | End: 2018-12-19

## 2018-12-17 RX ORDER — HYDROMORPHONE HYDROCHLORIDE 1 MG/ML
1 INJECTION, SOLUTION INTRAMUSCULAR; INTRAVENOUS; SUBCUTANEOUS EVERY 2 HOUR PRN
Status: DISCONTINUED | OUTPATIENT
Start: 2018-12-17 | End: 2018-12-21

## 2018-12-17 NOTE — TELEPHONE ENCOUNTER
Call to pt's wife to follow up and see how her  is feeling. She states he has been vomiting since Friday and he has been nauseous. Also states he hasnt been able to eat anything and when he was vomiting he had some drainage from his incision. She sta

## 2018-12-17 NOTE — BRIEF OP NOTE
Pre-Operative Diagnosis: Dehiscence of incision [T81.31XA]     Post-Operative Diagnosis: Dehiscence of incision [T81.31XA]      Procedure Performed:     EXPLORATORY LAPAROTOMY  REPAIR OF ABDOMINAL WOUND DEHISCENCE    Surgeon(s) and Role:     * Marylu Trivedi

## 2018-12-17 NOTE — CM/SW NOTE
12/17/18 0900   Discharge disposition   Expected discharge disposition Home or Self   Name of [address] [place] [address] services after discharge None   Discharge transportation Private car

## 2018-12-17 NOTE — H&P
[facility] Surgical Oncology    Patient Name:  Corazon Guajardo   YOB: 1965   Gender:  Male   Appt Date:  12/17/2018   Provider:  Sydnie White MD   Insurance:  [employer]  Primary Care Provider:Joseph Seldon Bound, Rigoberto Leventhal 135/80 (BP Location: Right arm)   Pulse 85   Temp 97.8 °F (36.6 °C) (Temporal)   Resp 17   Ht 1.727 m (5' 8\")   Wt 92.5 kg (204 lb)   SpO2 94%   BMI 31.02 kg/m²      Medications Reviewed:  No current outpatient medications on file.      Allergies Reviewed: dehiscence noted. Musculoskeletal: Extremities: no edema. Skin: Inspection and palpation: no jaundice.       Document Review:  None     Assessment / Plan:  The patient is s/p laparoscopic assisted CRS/HIPEC now with fascial dehiscence and intestinal baron

## 2018-12-17 NOTE — ANESTHESIA POSTPROCEDURE EVALUATION
[address] Patient Status:  Hospital Outpatient Surgery   Age/Gender 52year old male MRN [id number]   [facility] SURGERY Attending Clari Heredia MD   [facility] Day # 0 PCP Tamra Way DO       Anesthesia Post-op No

## 2018-12-17 NOTE — ANESTHESIA PREPROCEDURE EVALUATION
PRE-OP EVALUATION    Patient Name: Skip Shone    Pre-op Diagnosis: Dehiscence of incision [T81.31XA]    Procedure(s):  EXPLORATORY LAPAROTOMY    Surgeon(s) and Role:     Iris Acevedo MD - Primary    Pre-op vitals reviewed.   Temp: 97.8 °F (36.6 2016        Years since quittin.6      Smokeless tobacco: Former User        Quit date: 2003    Alcohol use: Yes      Alcohol/week: 0.0 oz      Comment: social      Drug use: No     Available pre-op labs reviewed.   Lab Results   Component Va

## 2018-12-18 PROCEDURE — 99233 SBSQ HOSP IP/OBS HIGH 50: CPT | Performed by: HOSPITALIST

## 2018-12-18 RX ORDER — LEVOTHYROXINE SODIUM 0.1 MG/1
100 TABLET ORAL
Status: DISCONTINUED | OUTPATIENT
Start: 2018-12-18 | End: 2018-12-21

## 2018-12-18 RX ORDER — LOSARTAN POTASSIUM 50 MG/1
50 TABLET ORAL DAILY
Status: DISCONTINUED | OUTPATIENT
Start: 2018-12-18 | End: 2018-12-21

## 2018-12-18 RX ORDER — ALBUTEROL SULFATE 90 UG/1
2 AEROSOL, METERED RESPIRATORY (INHALATION) EVERY 6 HOURS PRN
Status: DISCONTINUED | OUTPATIENT
Start: 2018-12-18 | End: 2018-12-21

## 2018-12-18 RX ORDER — ACETAMINOPHEN 500 MG
1000 TABLET ORAL EVERY 6 HOURS
Status: DISCONTINUED | OUTPATIENT
Start: 2018-12-18 | End: 2018-12-21

## 2018-12-18 RX ORDER — DOCUSATE SODIUM 100 MG/1
100 CAPSULE, LIQUID FILLED ORAL 2 TIMES DAILY
Status: DISCONTINUED | OUTPATIENT
Start: 2018-12-18 | End: 2018-12-21

## 2018-12-18 RX ORDER — PRAVASTATIN SODIUM 20 MG
20 TABLET ORAL NIGHTLY
Status: DISCONTINUED | OUTPATIENT
Start: 2018-12-18 | End: 2018-12-21

## 2018-12-18 RX ORDER — KETOROLAC TROMETHAMINE 15 MG/ML
15 INJECTION, SOLUTION INTRAMUSCULAR; INTRAVENOUS EVERY 6 HOURS PRN
Status: DISPENSED | OUTPATIENT
Start: 2018-12-18 | End: 2018-12-20

## 2018-12-18 RX ORDER — CETIRIZINE HYDROCHLORIDE 10 MG/1
10 TABLET ORAL DAILY
Status: DISCONTINUED | OUTPATIENT
Start: 2018-12-18 | End: 2018-12-21

## 2018-12-18 RX ORDER — POTASSIUM CHLORIDE 20 MEQ/1
40 TABLET, EXTENDED RELEASE ORAL EVERY 4 HOURS
Status: DISPENSED | OUTPATIENT
Start: 2018-12-18 | End: 2018-12-18

## 2018-12-18 NOTE — PROGRESS NOTES
Pt had two sm episodes of dark green emesis this morning, antiemetics given. Pt states it helped relieve the nausea.

## 2018-12-18 NOTE — PLAN OF CARE
NURSING ADMISSION NOTE      Patient admitted via Cart  Oriented to room. Safety precautions initiated. Bed in low position. Call light in reach.     Received pt a/ox4, 3L NC, NSR on tele at 1645  Pt has surgical dressing over abdomen with gauze, dry,

## 2018-12-18 NOTE — PAYOR COMM NOTE
--------------  ADMISSION REVIEW     Payor: [address] [place] [address] Drive #:  [id number]  Authorization Number: [id number]     Admit date: 12/17/18  Admit time: 46       Admitting Physician: Eugene Stevenson MD  Attending Physician:  Eugene Stevenson MD  [facility] BMI 31.02 kg/m²       Medications Reviewed:  No current outpatient medications on file.      Allergies Reviewed:    Eggs Or Egg-Derived*    OTHER (SEE COMMENTS)    Comment:Fried eggs cause lip numbness       Review of Systems:  A comprehensive 14 point rev dehiscence.             MEDICATIONS ADMINISTERED IN LAST 1 DAY:  CeFAZolin Sodium (ANCEF) 1 g injection     Date Action Dose Route User    12/17/2018 1306 Given 2 g (none) Marilu Tejeda MD      ceFAZolin sodium (ANCEF/KEFZOL) 2 GM/20ML premix IV syringe (none) Intravenous Elizabeth Donnelly, RN      lactated ringers infusion     Date Action Dose Route User    12/18/2018 1119 Rate/Dose Change (none) Intravenous Lauryn Mack RN    12/17/2018 1956 New Bag (none) Intravenous Radha Trujillo    12/17/2018 1454 Rate

## 2018-12-18 NOTE — PLAN OF CARE
Assumed care at 0730. A&OX4. C/o N&V. Dr. Edison Dhillon notified and pain meds changed to Tylenol and Toradol. Incision C/D/I with old drainage noted. Pt medicated with Zofran in am.  Refusing any other meds this afternoon.  Taking in small amounts of Ice cream.

## 2018-12-18 NOTE — CONSULTS
[facility]  1 [facility] Patient Status:  Inpatient    1965 MRN [id number]   [facility] 7NE-A Attending Olga Smith MD   [facility] Day # 1 PCP Delmy James DO     Reason for consult: medical mgmt- HTN, gou facility-administered medications on file prior to encounter. Current Outpatient Medications on File Prior to Encounter:  ondansetron 4 MG Oral Tablet Dispersible Take 1 tablet (4 mg total) by mouth every 8 (eight) hours as needed for Nausea.  Disp: 20 ta kg)   SpO2 93%   BMI 31.61 kg/m²   General: No acute distress. Alert and oriented x 3. HEENT: Normocephalic atraumatic. EOM-I. Anicteric. Neck: Supple, No JVD, No carotid bruits. Respiratory: Clear to auscultation bilaterally. No wheezes. No rhonchi.   C Lovenox   · CODE status: full  Plan of care discussed with patient     Elizabeth Adams MD  12/18/2018

## 2018-12-18 NOTE — CM/SW NOTE
Pt is a readmission for wound dihiscence. Pt had surgery with Dr Anh Reyna. Pt was screened during rounds and no needs are identified at this time. RN to contact SW/CM if needs arise.       12/18/18 1300   CM/SW Screening   Referral Source Social Work (self-

## 2018-12-18 NOTE — PLAN OF CARE
A/OX4, VSS 1-2L O2, NSR per Tele  Abdominal surgical dressing intact, sm amount of drainage present   Abdominal binder in place  Pt c/o abdominal pain- managed w/ pain medication  Pt c/o nausea- managed w/ antiemetics  X1 episode of sm amount of green emes

## 2018-12-18 NOTE — PROGRESS NOTES
POD #1 s/p reopening of recent laparotomy with repair of dehiscence  POD #7 s/p CRS/HIPEC    The patient had some N/V overnight. States it is related to pain medication. Pain overall controlled at rest.  No fevers or chills. Good urinary output.     BP 15

## 2018-12-18 NOTE — OPERATIVE REPORT
[address]    PATIENT'S NAME: Nicolas Freeman   ATTENDING PHYSICIAN: Geovanna Garcia. Martín Neal MD   OPERATING PHYSICIAN: Geovanna Garcai.  Martín Neal MD   PATIENT ACCOUNT#:   [id number]    LOCATION:  [address] [place] [address]  MEDICAL RECORD #:   [id number]       DATE OF BIRTH: patient tolerated the procedure well, without immediate complications. He was extubated in the operating room and was sent in stable condition to Recovery. Counts were correct. I was present throughout the procedure. Dictated By Beth Neal MD

## 2018-12-19 ENCOUNTER — APPOINTMENT (OUTPATIENT)
Dept: CT IMAGING | Facility: HOSPITAL | Age: 53
DRG: 908 | End: 2018-12-19
Attending: PHYSICIAN ASSISTANT
Payer: COMMERCIAL

## 2018-12-19 PROCEDURE — 99233 SBSQ HOSP IP/OBS HIGH 50: CPT | Performed by: HOSPITALIST

## 2018-12-19 PROCEDURE — 74177 CT ABD & PELVIS W/CONTRAST: CPT | Performed by: PHYSICIAN ASSISTANT

## 2018-12-19 PROCEDURE — 3E0336Z INTRODUCTION OF NUTRITIONAL SUBSTANCE INTO PERIPHERAL VEIN, PERCUTANEOUS APPROACH: ICD-10-PCS | Performed by: SURGERY

## 2018-12-19 RX ORDER — POTASSIUM CHLORIDE 20 MEQ/1
40 TABLET, EXTENDED RELEASE ORAL EVERY 4 HOURS
Status: DISCONTINUED | OUTPATIENT
Start: 2018-12-19 | End: 2018-12-19

## 2018-12-19 RX ORDER — DILTIAZEM HYDROCHLORIDE 5 MG/ML
5 INJECTION INTRAVENOUS ONCE
Status: COMPLETED | OUTPATIENT
Start: 2018-12-19 | End: 2018-12-19

## 2018-12-19 RX ORDER — DILTIAZEM HYDROCHLORIDE 5 MG/ML
10 INJECTION INTRAVENOUS ONCE
Status: DISCONTINUED | OUTPATIENT
Start: 2018-12-19 | End: 2018-12-21

## 2018-12-19 RX ORDER — METOCLOPRAMIDE HYDROCHLORIDE 5 MG/ML
10 INJECTION INTRAMUSCULAR; INTRAVENOUS EVERY 8 HOURS
Status: DISCONTINUED | OUTPATIENT
Start: 2018-12-19 | End: 2018-12-20

## 2018-12-19 RX ORDER — DILTIAZEM HYDROCHLORIDE 5 MG/ML
INJECTION INTRAVENOUS
Status: DISCONTINUED
Start: 2018-12-19 | End: 2018-12-19

## 2018-12-19 NOTE — PLAN OF CARE
Pt flipped into Afib RVR, HR up to 180s s/p emesis (100ml's) of bile. EKG confirmed. Dr. David Stone on unit and notified, 5mg IV cardizem given. Dr. Kenna Painter on unit and notified as well.

## 2018-12-19 NOTE — DIETARY NOTE
[facility]    NUTRITION INITIAL ASSESSMENT     Pt does not meet malnutrition criteria.     NUTRITION DIAGNOSIS/PROBLEM:    Inadequate oral intake related to inability to consume sufficient po as evidenced by pt POD #2 s/p reopening of recent laparotomy Addresses: Yes    NUTRITION RELATED PHYSICAL FINDINGS:     1. Body Fat/Muscle Mass: BMI: 31.61     2.  Fluid Accumulation: none noted    NUTRITION PRESCRIPTION: based on IBW 70 kg  Calories: 9061-1823 calories/day (25-30 calories per kg)  Protein: 105-126 g

## 2018-12-19 NOTE — PROGRESS NOTES
[facility]  Progress Note     [address] [facility] Patient Status:  Inpatient    1965 MRN [id number]   [facility] 7NE-A Attending Sneha Bailey MD   [facility] Day # 1 PCP Jarrod Nguyen,      Chief Complaint: nausea vomiting    S: hours.         Imaging: Imaging data reviewed in Epic.     Medications:   • Potassium Chloride ER  40 mEq Oral Q4H   • DilTIAZem HCl  10 mg Intravenous Once   • docusate sodium  100 mg Oral BID   • acetaminophen  1,000 mg Oral Q6H   • Fluticasone Furoate-Vi

## 2018-12-19 NOTE — PLAN OF CARE
Assumed care at 0700. Scheduled tylenol for pain. RA  NSR on tele. Emesis x 1 @ 1030 and went into Afib RVR w/ HR up to 180's. Cardizem bolus x 3 given. Gtt started at 5ml/hour. EKG confirmed. Pt converted back to NSR at 1206 and has maintained.  Adelfo gramajo

## 2018-12-19 NOTE — PLAN OF CARE
Assumed care @ 1900. AOx4, NSR on tele, room air, VSS. No bowel movements. Not passing gas. (+) hypoactive bowel sounds. Midline abd dressing dry/intact, sm amount old drainage. Bilateral abd dressings c/d/i.   Pt c/o pain- given PO Tylenol with some re

## 2018-12-19 NOTE — PROGRESS NOTES
POD #2 s/p reopening of recent laparotomy with repair of dehiscence  POD #8 s/p CRS/HIPEC    Afebrile and hemodynamically stable  The patient continues to have nausea and emesis  Good urinary output.     While in the room patient had episode of emesis with

## 2018-12-19 NOTE — DISCHARGE SUMMARY
[facility]  Discharge Summary    [address] [facility] Patient Status:  Inpatient    1965 MRN [id number]   [facility] 7NE-A Attending No att. providers found   Hosp Day # 3 PCP Chemo Antonio DO     Date of Admission: 2018 terminal ileum with partial colon resection and ileocolic anastomosis. 2.       Peritonectomy. 3.       Omentectomy (greater and lesser). 4.       Cytoreductive surgery. 5.       Mobilization of the splenic flexure.     6.       Hyperthermic int R-3    Albuterol Sulfate  (90 Base) MCG/ACT Inhalation Aero Soln  Inhale 2 puffs into the lungs every 6 (six) hours as needed for Wheezing., Normal, Disp-1 Inhaler, R-6      STOP taking these medications    Neomycin Sulfate 500 MG Oral Tab    metRON

## 2018-12-20 PROCEDURE — 99232 SBSQ HOSP IP/OBS MODERATE 35: CPT | Performed by: HOSPITALIST

## 2018-12-20 RX ORDER — KETOROLAC TROMETHAMINE 15 MG/ML
15 INJECTION, SOLUTION INTRAMUSCULAR; INTRAVENOUS EVERY 6 HOURS PRN
Status: DISCONTINUED | OUTPATIENT
Start: 2018-12-20 | End: 2018-12-21

## 2018-12-20 RX ORDER — METOCLOPRAMIDE HYDROCHLORIDE 5 MG/ML
10 INJECTION INTRAMUSCULAR; INTRAVENOUS EVERY 8 HOURS PRN
Status: DISCONTINUED | OUTPATIENT
Start: 2018-12-20 | End: 2018-12-21

## 2018-12-20 NOTE — PROGRESS NOTES
POD #3 s/p reopening of recent laparotomy with repair of dehiscence  POD #9 s/p CRS/HIPEC    Afebrile and hemodynamically stable. He has had no more episodes of a. Fib. The patient had multiple loose BMs overnight. C. Diff negative.   No fevers or chills

## 2018-12-20 NOTE — PLAN OF CARE
Assumed care @ 1900. AOx4, NSR on tele, room air, VSS. Pt given scheduled tylenol for pain control. Pt denied nausea/vomiting and refused antiemetic. TPN infusing as ordered. Abd incision d/i, scant amount of old brown drainage.  Abd distended, tender

## 2018-12-20 NOTE — DIETARY NOTE
Nutrition short note: TPN    Parenteral Nutrition - TPN infusing without difficulty. TPN tonight to provide 700 dextrose calories, 400 lipid calories, 85 grams protein in 2400 ml fluid to meet ~75% pt  needs.  Appropriate to increase TPN to goal cautiousl

## 2018-12-20 NOTE — PROGRESS NOTES
[facility]  Progress Note     Goldie Agustin Patient Status:  Inpatient    1965 MRN [id number]   [facility] 7NE-A Attending Niesha Dan MD   [facility] Day # 2 PCP Ethan Maier DO     Chief Complaint: abd pain and distensi last 168 hours. Imaging: Imaging data reviewed in Epic.     Medications:   • DilTIAZem HCl  10 mg Intravenous Once   • docusate sodium  100 mg Oral BID   • acetaminophen  1,000 mg Oral Q6H   • Fluticasone Furoate-Vilanterol  1 puff Inhalation Daily

## 2018-12-20 NOTE — PAYOR COMM NOTE
--------------  CONTINUED STAY REVIEW    Payor: [address] [place] [address] Drive #:  [id number]  Authorization Number: [id number]    Admit date: 12/18/18  Admit time: 2004    Admitting Physician: Susi Latif MD  Attending Physician:  Susi Latif MD  [facility] m (5' 8\")   Wt 94.3 kg (207 lb 14.3 oz)   SpO2 90%   BMI 31.61 kg/m²      WBC 7.2 12/19/2018     HGB 14.3 12/19/2018     HCT 42.7 12/19/2018     .0 12/19/2018     CREATSERUM 1.10 12/19/2018     BUN 28 12/19/2018      12/19/2018     K 3.5 12/1   ALT 58 12/20/2018     MG 2.2 12/20/2018     PHOS 3.0 12/20/2018        A/P  -CT abdomen/pelvis yesterday revealed post-operative ileus.  No evidence of obstruction or transition point.  -continue TPN via midline  -Continue Toradol and tylenol prn for be

## 2018-12-20 NOTE — PROGRESS NOTES
12/20/18 1629   Clinical Encounter Type   Visited With Patient   Routine Visit Follow-up   Patient's Supportive Strategies/Resources  offered emotional and spiritual support including active listening and acknowlegement of the concerns of the pa

## 2018-12-21 VITALS
TEMPERATURE: 98 F | HEART RATE: 86 BPM | HEIGHT: 68 IN | WEIGHT: 202.19 LBS | BODY MASS INDEX: 30.64 KG/M2 | RESPIRATION RATE: 20 BRPM | DIASTOLIC BLOOD PRESSURE: 68 MMHG | SYSTOLIC BLOOD PRESSURE: 137 MMHG | OXYGEN SATURATION: 96 %

## 2018-12-21 PROCEDURE — 99232 SBSQ HOSP IP/OBS MODERATE 35: CPT | Performed by: HOSPITALIST

## 2018-12-21 NOTE — PROGRESS NOTES
[facility]  Progress Note     Dian Lacy Patient Status:  Inpatient    1965 MRN [id number]   [facility] 7NE-A Attending Arcelia Chen MD   [facility] Day # 3 PCP Ana Griffiths DO     Chief Complaint: abd pain and distensi Intravenous Once   • docusate sodium  100 mg Oral BID   • acetaminophen  1,000 mg Oral Q6H   • Fluticasone Furoate-Vilanterol  1 puff Inhalation Daily   • cetirizine  10 mg Oral Daily   • Levothyroxine Sodium  100 mcg Oral Before breakfast   • Pravastatin

## 2018-12-21 NOTE — PLAN OF CARE
Assumed care at [address]. AOx4. C/o slight incision pain. Scheduled Tylenol given. Midline dressing C/D/I. Abdominal binder in place. Hypoactive bowel sounds noted. (+) flatus and had BM. Up as tolerated. Continue on TPN. Plan of care discussed with pt.

## 2018-12-21 NOTE — PROGRESS NOTES
POD #4 s/p reopening of recent laparotomy with repair of dehiscence  POD #10 s/p CRS/HIPEC    Afebrile and hemodynamically stable. He has had no more episodes of a. Fib.   No fevers or chills  No N/V overnight  Tolerating low fiber diet    /65 (BP Loc

## 2018-12-24 ENCOUNTER — TELEPHONE (OUTPATIENT)
Dept: SURGERY | Facility: CLINIC | Age: 53
End: 2018-12-24

## 2018-12-24 PROBLEM — T81.321A ABDOMINAL WALL DEHISCENCE: Status: ACTIVE | Noted: 2018-12-24

## 2018-12-24 PROBLEM — T81.30XA ABDOMINAL WALL DEHISCENCE: Status: ACTIVE | Noted: 2018-12-24

## 2018-12-24 NOTE — TELEPHONE ENCOUNTER
Call to pt to see how he is feeling since being discharged from the hospital. Pt states he is doing very well. He states the pain has been minimal and he has only been taking tylenol as needed. Pt states he has had a little nausea but no vomiting.  Pt denie

## 2018-12-26 ENCOUNTER — OFFICE VISIT (OUTPATIENT)
Dept: SURGERY | Facility: CLINIC | Age: 53
End: 2018-12-26
Payer: COMMERCIAL

## 2018-12-26 ENCOUNTER — RESEARCH ENCOUNTER (OUTPATIENT)
Dept: HEMATOLOGY/ONCOLOGY | Facility: HOSPITAL | Age: 53
End: 2018-12-26

## 2018-12-26 VITALS
HEIGHT: 68 IN | BODY MASS INDEX: 30.41 KG/M2 | WEIGHT: 200.63 LBS | SYSTOLIC BLOOD PRESSURE: 126 MMHG | TEMPERATURE: 98 F | OXYGEN SATURATION: 98 % | HEART RATE: 89 BPM | DIASTOLIC BLOOD PRESSURE: 73 MMHG | RESPIRATION RATE: 18 BRPM

## 2018-12-26 DIAGNOSIS — C18.1: Primary | ICD-10-CM

## 2018-12-26 NOTE — DISCHARGE SUMMARY
[facility]  Discharge Summary    [address] [facility] Patient Status:  Inpatient    1965 MRN [id number]   [facility] 7NE-A Attending No att. providers found   Hosp Day # 3 PCP Giuliana Alvarez DO     Date of Admission: 2018 12/11/18.      His hospital course was fairly unremarkable and he was discharged home on POD #3. He represented with complaints of abdominal pain and N/V.  Wound exploration revealed fascial dehiscence of recent laparotomy incision with controlled bowel sandip Normal, Disp-90 tablet, R-1    Albuterol Sulfate  (90 Base) MCG/ACT Inhalation Aero Soln  Inhale 2 puffs into the lungs every 6 (six) hours as needed for Wheezing., Normal, Disp-1 Inhaler, R-6    Fenofibrate 160 MG Oral Tab  Take 1 tablet (160 mg to

## 2018-12-26 NOTE — PROGRESS NOTES
STUDY: HIPEC DATABASE REGISTRY  ID # [id number]    Patient with adeno ca of appendix. S/p cytoreductive surgery and HIPEC procedure on 12/11/18. Seen and examined per Etienne Cuenca for post op f/u. MD explained data registry.    Introduced myself and explained ro

## 2018-12-26 NOTE — PROGRESS NOTES
[facility] Surgical Oncology    Patient Name:  Emilia Schultz   YOB: 1965   Gender:  Male   Appt Date:  12/26/2018   Provider:  Johana Brito MD   Insurance:  TOM Louis 45, DO   A /73 (BP Location: Left arm, Patient Position: Sitting, Cuff Size: adult)   Pulse 89   Temp 98.1 °F (36.7 °C) (Tympanic)   Resp 18   Ht 1.727 m (5' 8\")   Wt 91 kg (200 lb 9.6 oz)   SpO2 98%   BMI 30.50 kg/m²      Medications Reviewed:    Current Outp Eggs Or Egg-Derived*    OTHER (SEE COMMENTS)    Comment:Fried eggs cause lip numbness     History:  Reviewed:  Past Medical History:   Diagnosis Date   • Asthma    • Disorder of thyroid    • Essential hypertension    • Extrinsic asthma, unspecified    • Go -Small residual focus of adenocarcinoma measuring 0.4 cm in greatest dimension at the previous appendectomy site.  -Background tissue with chronic inflammation, fat necrosis, and giant cell reaction.  -Two out of nineteen lymph nodes (2/19), POSITIVE for m

## 2018-12-28 ENCOUNTER — TELEPHONE (OUTPATIENT)
Dept: SURGERY | Facility: CLINIC | Age: 53
End: 2018-12-28

## 2018-12-28 NOTE — TELEPHONE ENCOUNTER
Wife calling to report patient has been having generalized pain that is not relieved with Tylenol. Denies symptoms of fever, nausea, vomiting. Script placed for Frida by Dr. Phill Henrandez and wife will be in to pick it up today in office.

## 2018-12-31 ENCOUNTER — OFFICE VISIT (OUTPATIENT)
Dept: SURGERY | Facility: CLINIC | Age: 53
End: 2018-12-31
Payer: COMMERCIAL

## 2018-12-31 VITALS
TEMPERATURE: 98 F | BODY MASS INDEX: 30.31 KG/M2 | RESPIRATION RATE: 20 BRPM | DIASTOLIC BLOOD PRESSURE: 84 MMHG | WEIGHT: 200 LBS | OXYGEN SATURATION: 95 % | SYSTOLIC BLOOD PRESSURE: 134 MMHG | HEART RATE: 96 BPM | HEIGHT: 68 IN

## 2018-12-31 DIAGNOSIS — C18.1 PRIMARY APPENDICEAL ADENOCARCINOMA (HCC): Primary | ICD-10-CM

## 2018-12-31 PROCEDURE — 99024 POSTOP FOLLOW-UP VISIT: CPT | Performed by: SURGERY

## 2018-12-31 NOTE — PROGRESS NOTES
[facility] Surgical Oncology    Patient Name:  Marleni Savage   YOB: 1965   Gender:  Male   Appt Date:  12/26/2018   Provider:  Baldemar Pardo MD   Insurance:  TOM Louis 45, DO   A /84 (BP Location: Left arm, Patient Position: Sitting, Cuff Size: adult)   Pulse 96   Temp 98 °F (36.7 °C) (Tympanic)   Resp 20   Ht 1.727 m (5' 8\")   Wt 90.7 kg (200 lb)   SpO2 95%   BMI 30.41 kg/m²      Medications Reviewed:    Current Outpatient •  Albuterol Sulfate  (90 Base) MCG/ACT Inhalation Aero Soln, Inhale 2 puffs into the lungs every 6 (six) hours as needed for Wheezing., Disp: 1 Inhaler, Rfl: 6     Allergies Reviewed:    Eggs Or Egg-Derived*    OTHER (SEE COMMENTS)    Comment:Pieter Final Diagnosis:   A- Resection, lesser omentum:  -Mature adipose tissue.  -No evidence of malignancy.     B- Resection, terminal ileum, portion of right colon:  -Small residual focus of adenocarcinoma measuring 0.4 cm in greatest dimension at the previous

## 2019-01-04 ENCOUNTER — OFFICE VISIT (OUTPATIENT)
Dept: HEMATOLOGY/ONCOLOGY | Facility: HOSPITAL | Age: 54
End: 2019-01-04
Attending: INTERNAL MEDICINE
Payer: COMMERCIAL

## 2019-01-04 VITALS
TEMPERATURE: 98 F | OXYGEN SATURATION: 99 % | WEIGHT: 195.81 LBS | DIASTOLIC BLOOD PRESSURE: 83 MMHG | BODY MASS INDEX: 29.68 KG/M2 | HEART RATE: 91 BPM | RESPIRATION RATE: 18 BRPM | SYSTOLIC BLOOD PRESSURE: 142 MMHG | HEIGHT: 68 IN

## 2019-01-04 DIAGNOSIS — C18.1 PRIMARY APPENDICEAL ADENOCARCINOMA (HCC): Primary | ICD-10-CM

## 2019-01-04 PROCEDURE — 99245 OFF/OP CONSLTJ NEW/EST HI 55: CPT | Performed by: INTERNAL MEDICINE

## 2019-01-04 NOTE — PROGRESS NOTES
Patient is here for MD consult. Patient had HIPEC on 12/11. Patient went back to surgery one week later due to wound dehiscence. Patient has mild incisional pain. Taking Tylenol as needed. Here to discuss the next steps and POC.        Education Record    L

## 2019-01-07 ENCOUNTER — TELEPHONE (OUTPATIENT)
Dept: SURGERY | Facility: CLINIC | Age: 54
End: 2019-01-07

## 2019-01-07 NOTE — TELEPHONE ENCOUNTER
Returned Alejandra's call. She states that Joseph Stephens is still having some drainage from the umbilical incision. Denies fever, chills, redness or pain. She states that she is changing dressing 2x per eric.  Offered patient to see Jie Morley today although they w

## 2019-01-07 NOTE — CONSULTS
[facility]    PATIENT'S NAME: Arvin Deleon   CONSULTING PHYSICIAN: Huyen Magaña M.D.    PATIENT ACCOUNT #: [de-identified] LOCATION: [address] [place] [address] RECORD #: [id number] YOB: 1965   CONSULTATION DATE: 01/04/2019       CHECO The margins of resection were free of malignancy. The patient is recovering postoperatively. He is due to see Surgery in followup. He is still having some incisional pain; he is taking Tylenol p.r.n. He stopped taking hydrocodone.   The patient had Karina Hand that are in good health. REVIEW OF SYSTEMS:  No headaches. His vision is fair. He has no glaucoma or cataracts, wears glasses. He has asthma, as noted. He has no GERD. He has no peptic ulcer disease or hepatitis.   He has had 1 colonoscopy in the pa for systemic progression given his yakelin disease. As a result, we talked about adjuvant chemotherapy.   While there are no randomized trials in appendiceal carcinoma, the closest parallel is colorectal cancer and is obviously a well established approach in 07:38:10  Job [id number]  /    cc: SIRENA Araujo M.D. Tresea Freiberg, D.O. Tilford Raw, APN

## 2019-01-09 ENCOUNTER — OFFICE VISIT (OUTPATIENT)
Dept: SURGERY | Facility: CLINIC | Age: 54
End: 2019-01-09
Payer: COMMERCIAL

## 2019-01-09 VITALS
HEART RATE: 102 BPM | RESPIRATION RATE: 18 BRPM | SYSTOLIC BLOOD PRESSURE: 137 MMHG | DIASTOLIC BLOOD PRESSURE: 85 MMHG | TEMPERATURE: 99 F | HEIGHT: 68 IN | BODY MASS INDEX: 29.25 KG/M2 | OXYGEN SATURATION: 97 % | WEIGHT: 193 LBS

## 2019-01-09 DIAGNOSIS — C18.1: Primary | ICD-10-CM

## 2019-01-09 PROCEDURE — 99024 POSTOP FOLLOW-UP VISIT: CPT | Performed by: SURGERY

## 2019-01-11 ENCOUNTER — DIETICIAN VISIT (OUTPATIENT)
Dept: HEMATOLOGY/ONCOLOGY | Facility: HOSPITAL | Age: 54
End: 2019-01-11

## 2019-01-11 ENCOUNTER — TELEPHONE (OUTPATIENT)
Dept: SURGERY | Facility: CLINIC | Age: 54
End: 2019-01-11

## 2019-01-11 NOTE — TELEPHONE ENCOUNTER
Returned wife's call inquiring about abdominal wound. Wants to know if suturing will expedite healing over daily wound packing and if so would like to come in next week Monday to have done. Informed Gonsalo Vitale I will ask Kamilah Prescott and get back to her.

## 2019-01-11 NOTE — PROGRESS NOTES
Nutrition screen complete as triggered by [employer] dx of appendiceal carcinoma s/p CRS/HIPEC 12/11/18. Chart reviewed. Noted ~ 26 lb wt loss x 6 wks. Noted pt to begin post-op chemotherapy. Pt appears at a moderate to high nutrition risk at present.  R

## 2019-01-11 NOTE — TELEPHONE ENCOUNTER
Per Nathalia Bethea PA-C and Dr. Samantha Daniels suturing of abdominal wound would need to take place in the OR. Patient added to schedule on Monday at 10:30am to discuss wound management.

## 2019-01-14 ENCOUNTER — OFFICE VISIT (OUTPATIENT)
Dept: SURGERY | Facility: CLINIC | Age: 54
End: 2019-01-14
Payer: COMMERCIAL

## 2019-01-14 DIAGNOSIS — T14.8XXA WOUND INFECTION: Primary | ICD-10-CM

## 2019-01-14 DIAGNOSIS — C18.1: ICD-10-CM

## 2019-01-14 DIAGNOSIS — L08.9 WOUND INFECTION: Primary | ICD-10-CM

## 2019-01-14 DIAGNOSIS — R10.10 PAIN OF UPPER ABDOMEN: ICD-10-CM

## 2019-01-14 PROCEDURE — 99024 POSTOP FOLLOW-UP VISIT: CPT | Performed by: PHYSICIAN ASSISTANT

## 2019-01-14 RX ORDER — HYDROCODONE BITARTRATE AND ACETAMINOPHEN 5; 325 MG/1; MG/1
1 TABLET ORAL EVERY 6 HOURS PRN
Qty: 15 TABLET | Refills: 0 | Status: SHIPPED | OUTPATIENT
Start: 2019-01-14 | End: 2019-01-21

## 2019-01-14 NOTE — PROGRESS NOTES
Confirmed with [facility] pharmacist, Skinny Obdulio they do have Dakins 0.125% and did received escribed script.

## 2019-01-14 NOTE — PROGRESS NOTES
[facility] Surgical Oncology    Patient Name:  Rhonda Youssef   YOB: 1965   Gender:  Male   Appt Date:  1/14/2018   Provider:  General Arsen MD   Insurance:  TOM Louis 45, DO   Ad There were no vitals taken for this visit.      Medications Reviewed:    Current Outpatient Medications:   •  HYDROcodone-acetaminophen 5-325 MG Oral Tab, Take 1 tablet by mouth every 6 (six) hours as needed for Pain., Disp: 15 tablet, Rfl: 0  •  Dakins 0.1 • Other surgical history  12/17/2018    Reopening of recent laparotomy, repair of dehiscence   • Other surgical history  12/11/2018    HIPEC   • Removal gallbladder        Reviewed Social History:  Social History    Tobacco Use      Smoking status: Former

## 2019-01-15 ENCOUNTER — OFFICE VISIT (OUTPATIENT)
Dept: HEMATOLOGY/ONCOLOGY | Facility: HOSPITAL | Age: 54
End: 2019-01-15
Attending: INTERNAL MEDICINE
Payer: COMMERCIAL

## 2019-01-15 VITALS
TEMPERATURE: 98 F | SYSTOLIC BLOOD PRESSURE: 150 MMHG | HEART RATE: 94 BPM | HEIGHT: 67.99 IN | DIASTOLIC BLOOD PRESSURE: 82 MMHG | BODY MASS INDEX: 29.31 KG/M2 | OXYGEN SATURATION: 96 % | WEIGHT: 193.38 LBS

## 2019-01-15 DIAGNOSIS — C18.1 PRIMARY APPENDICEAL ADENOCARCINOMA (HCC): Primary | ICD-10-CM

## 2019-01-15 DIAGNOSIS — E88.81 METABOLIC SYNDROME: ICD-10-CM

## 2019-01-15 DIAGNOSIS — E78.1 HYPERTRIGLYCERIDEMIA: ICD-10-CM

## 2019-01-15 PROCEDURE — 99214 OFFICE O/P EST MOD 30 MIN: CPT | Performed by: INTERNAL MEDICINE

## 2019-01-15 RX ORDER — LOSARTAN POTASSIUM AND HYDROCHLOROTHIAZIDE 12.5; 5 MG/1; MG/1
1 TABLET ORAL DAILY
Qty: 90 TABLET | Refills: 0 | Status: SHIPPED | OUTPATIENT
Start: 2019-01-15 | End: 2019-04-06

## 2019-01-15 RX ORDER — FENOFIBRATE 160 MG/1
TABLET ORAL
Qty: 90 TABLET | Refills: 0 | Status: SHIPPED | OUTPATIENT
Start: 2019-01-15 | End: 2019-06-02

## 2019-01-15 NOTE — TELEPHONE ENCOUNTER
Last refill- 12/5/18  The source prescription was discontinued on 6/26/2018 by Sweetie Petersen DO        Last Visit- 11/6/18      Please approve or deny medication

## 2019-01-15 NOTE — PROGRESS NOTES
Patient is here for MD f/u appendiceal cancer. Patient has an umbilical wound that is healing. Packing wound twice daily. Energy level is improving everyday. Appetite is good. Inquiring about chemo start date.          Education Record    Learner:  Patient

## 2019-01-18 ENCOUNTER — TELEPHONE (OUTPATIENT)
Dept: SURGERY | Facility: CLINIC | Age: 54
End: 2019-01-18

## 2019-01-18 DIAGNOSIS — C18.1 APPENDIX CARCINOMA (HCC): Primary | ICD-10-CM

## 2019-01-18 NOTE — TELEPHONE ENCOUNTER
Phoned pt for port placement prior to starting chemo 2/1/19- Surgery scheduled with Dr. Jv Hackett 1/28/19 at [facility]. Surgical check list reviewed with pt, asking appropriate wuestions, and verbalizing understanding.

## 2019-01-20 NOTE — PROGRESS NOTES
[facility]    PATIENT'S NAME: Donavon Mccarthy   ATTENDING PHYSICIAN: Delaney Leblanc M.D.    PATIENT ACCOUNT #: [de-identified] LOCATION: [address] [place] [address] RECORD #: [id number] YOB: 1965   DATE OF SERVICE: 01/15/2019       CANCER C is going to receive 3 months of treatment based upon the results of the IDEA trial.  Again, there is no absolute correlate to this and appendiceal cancer, but the biology appears to be similar to that of colon cancer.   Having said that, I have told him we

## 2019-01-21 ENCOUNTER — TELEPHONE (OUTPATIENT)
Dept: SURGERY | Facility: CLINIC | Age: 54
End: 2019-01-21

## 2019-01-21 DIAGNOSIS — R10.10 PAIN OF UPPER ABDOMEN: ICD-10-CM

## 2019-01-21 RX ORDER — HYDROCODONE BITARTRATE AND ACETAMINOPHEN 5; 325 MG/1; MG/1
1 TABLET ORAL EVERY 6 HOURS PRN
Qty: 30 TABLET | Refills: 0 | Status: ON HOLD | OUTPATIENT
Start: 2019-01-21 | End: 2019-01-26

## 2019-01-21 NOTE — TELEPHONE ENCOUNTER
Call from pt's wife stating that her  would like a refill of his hydrocodone. Last refill was on 1/14/19 #15. She states he has been taking 2 tablets at night to help him sleep.  Advised pt's wife that per Sherwin Tena PA-C she will refill the presc

## 2019-01-23 ENCOUNTER — OFFICE VISIT (OUTPATIENT)
Dept: HEMATOLOGY/ONCOLOGY | Facility: HOSPITAL | Age: 54
End: 2019-01-23
Attending: INTERNAL MEDICINE
Payer: COMMERCIAL

## 2019-01-23 ENCOUNTER — TELEPHONE (OUTPATIENT)
Dept: SURGERY | Facility: CLINIC | Age: 54
End: 2019-01-23

## 2019-01-23 ENCOUNTER — OFFICE VISIT (OUTPATIENT)
Dept: SURGERY | Facility: CLINIC | Age: 54
End: 2019-01-23
Payer: COMMERCIAL

## 2019-01-23 VITALS
HEIGHT: 68 IN | RESPIRATION RATE: 16 BRPM | BODY MASS INDEX: 29.1 KG/M2 | HEART RATE: 102 BPM | DIASTOLIC BLOOD PRESSURE: 81 MMHG | OXYGEN SATURATION: 96 % | SYSTOLIC BLOOD PRESSURE: 153 MMHG | TEMPERATURE: 98 F | WEIGHT: 192 LBS

## 2019-01-23 DIAGNOSIS — C18.1: ICD-10-CM

## 2019-01-23 DIAGNOSIS — C18.1 PRIMARY APPENDICEAL ADENOCARCINOMA (HCC): Primary | ICD-10-CM

## 2019-01-23 DIAGNOSIS — Z51.89 ENCOUNTER FOR WOUND RE-CHECK: Primary | ICD-10-CM

## 2019-01-23 PROCEDURE — 99215 OFFICE O/P EST HI 40 MIN: CPT | Performed by: CLINICAL NURSE SPECIALIST

## 2019-01-23 PROCEDURE — 99024 POSTOP FOLLOW-UP VISIT: CPT | Performed by: SURGERY

## 2019-01-23 NOTE — TELEPHONE ENCOUNTER
Wound vac orders and home health orders faxed to [facility] and [facility].

## 2019-01-24 ENCOUNTER — TELEPHONE (OUTPATIENT)
Dept: HEMATOLOGY/ONCOLOGY | Facility: HOSPITAL | Age: 54
End: 2019-01-24

## 2019-01-24 DIAGNOSIS — L08.9 WOUND INFECTION: Primary | ICD-10-CM

## 2019-01-24 DIAGNOSIS — T14.8XXA WOUND INFECTION: Primary | ICD-10-CM

## 2019-01-24 NOTE — TELEPHONE ENCOUNTER
I have called [facility] to follow up on the order. They have received the order however they need additional information. They need the H & P and the pathology report. I have faxed both over to them this morning.

## 2019-01-24 NOTE — TELEPHONE ENCOUNTER
I have called and spoke to [facility] SURGICAL [facility] trying to have this approval expedited. Per Dr. Tamy Pruitt he would like to do the surgery on Saturday.  Patty has sent thi to the patient insurance and it is still pending as of [phone] [id number] on 1/24/18

## 2019-01-24 NOTE — TELEPHONE ENCOUNTER
Spoke to patient. He believes he is getting the wound opened and the wound-vac attached on Saturday. Will ask him to call me on Tuesday so we can coordinate when we are starting.   Shawn Lee MD

## 2019-01-25 ENCOUNTER — TELEPHONE (OUTPATIENT)
Dept: SURGERY | Facility: CLINIC | Age: 54
End: 2019-01-25

## 2019-01-25 DIAGNOSIS — T14.8XXA WOUND INFECTION: Primary | ICD-10-CM

## 2019-01-25 DIAGNOSIS — L08.9 WOUND INFECTION: Primary | ICD-10-CM

## 2019-01-25 DIAGNOSIS — C18.1 APPENDIX CARCINOMA (HCC): Primary | ICD-10-CM

## 2019-01-25 NOTE — TELEPHONE ENCOUNTER
Spoke with Brittni Ambriz at [facility] regarding wound vac to be delivered to [facility] on 1/26/19. She states the insurance is still pending for the wound vac and she will follow up with the insurance company later on today.

## 2019-01-26 ENCOUNTER — HOSPITAL ENCOUNTER (OUTPATIENT)
Facility: HOSPITAL | Age: 54
Setting detail: HOSPITAL OUTPATIENT SURGERY
Discharge: HOME | End: 2019-01-26
Attending: SURGERY | Admitting: SURGERY
Payer: COMMERCIAL

## 2019-01-26 VITALS
HEIGHT: 68 IN | SYSTOLIC BLOOD PRESSURE: 115 MMHG | BODY MASS INDEX: 29.4 KG/M2 | HEART RATE: 78 BPM | WEIGHT: 194 LBS | TEMPERATURE: 98 F | RESPIRATION RATE: 16 BRPM | DIASTOLIC BLOOD PRESSURE: 72 MMHG | OXYGEN SATURATION: 94 %

## 2019-01-26 DIAGNOSIS — L08.9 WOUND INFECTION: ICD-10-CM

## 2019-01-26 DIAGNOSIS — T14.8XXA WOUND INFECTION: ICD-10-CM

## 2019-01-26 DIAGNOSIS — R10.10 PAIN OF UPPER ABDOMEN: ICD-10-CM

## 2019-01-26 PROCEDURE — 87070 CULTURE OTHR SPECIMN AEROBIC: CPT | Performed by: SURGERY

## 2019-01-26 PROCEDURE — 87176 TISSUE HOMOGENIZATION CULTR: CPT | Performed by: SURGERY

## 2019-01-26 PROCEDURE — 87205 SMEAR GRAM STAIN: CPT | Performed by: SURGERY

## 2019-01-26 PROCEDURE — 88304 TISSUE EXAM BY PATHOLOGIST: CPT | Performed by: SURGERY

## 2019-01-26 PROCEDURE — 87147 CULTURE TYPE IMMUNOLOGIC: CPT | Performed by: SURGERY

## 2019-01-26 PROCEDURE — 87077 CULTURE AEROBIC IDENTIFY: CPT | Performed by: SURGERY

## 2019-01-26 PROCEDURE — 0JB80ZZ EXCISION OF ABDOMEN SUBCUTANEOUS TISSUE AND FASCIA, OPEN APPROACH: ICD-10-PCS | Performed by: SURGERY

## 2019-01-26 PROCEDURE — 87186 SC STD MICRODIL/AGAR DIL: CPT | Performed by: SURGERY

## 2019-01-26 RX ORDER — ONDANSETRON 2 MG/ML
4 INJECTION INTRAMUSCULAR; INTRAVENOUS AS NEEDED
Status: DISCONTINUED | OUTPATIENT
Start: 2019-01-26 | End: 2019-01-26

## 2019-01-26 RX ORDER — METOCLOPRAMIDE HYDROCHLORIDE 5 MG/ML
10 INJECTION INTRAMUSCULAR; INTRAVENOUS AS NEEDED
Status: DISCONTINUED | OUTPATIENT
Start: 2019-01-26 | End: 2019-01-26

## 2019-01-26 RX ORDER — HYDROCODONE BITARTRATE AND ACETAMINOPHEN 5; 325 MG/1; MG/1
2 TABLET ORAL AS NEEDED
Status: COMPLETED | OUTPATIENT
Start: 2019-01-26 | End: 2019-01-26

## 2019-01-26 RX ORDER — HYDROCODONE BITARTRATE AND ACETAMINOPHEN 5; 325 MG/1; MG/1
1 TABLET ORAL EVERY 6 HOURS PRN
Qty: 30 TABLET | Refills: 0 | Status: SHIPPED | OUTPATIENT
Start: 2019-01-26 | End: 2019-03-01 | Stop reason: ALTCHOICE

## 2019-01-26 RX ORDER — NALOXONE HYDROCHLORIDE 0.4 MG/ML
80 INJECTION, SOLUTION INTRAMUSCULAR; INTRAVENOUS; SUBCUTANEOUS AS NEEDED
Status: DISCONTINUED | OUTPATIENT
Start: 2019-01-26 | End: 2019-01-26

## 2019-01-26 RX ORDER — MAGNESIUM HYDROXIDE 1200 MG/15ML
LIQUID ORAL CONTINUOUS PRN
Status: COMPLETED | OUTPATIENT
Start: 2019-01-26 | End: 2019-01-26

## 2019-01-26 RX ORDER — SODIUM CHLORIDE, SODIUM LACTATE, POTASSIUM CHLORIDE, CALCIUM CHLORIDE 600; 310; 30; 20 MG/100ML; MG/100ML; MG/100ML; MG/100ML
INJECTION, SOLUTION INTRAVENOUS CONTINUOUS
Status: DISCONTINUED | OUTPATIENT
Start: 2019-01-26 | End: 2019-01-26

## 2019-01-26 RX ORDER — HYDROCODONE BITARTRATE AND ACETAMINOPHEN 5; 325 MG/1; MG/1
1 TABLET ORAL AS NEEDED
Status: COMPLETED | OUTPATIENT
Start: 2019-01-26 | End: 2019-01-26

## 2019-01-26 RX ORDER — CEFAZOLIN SODIUM/WATER 2 G/20 ML
2 SYRINGE (ML) INTRAVENOUS ONCE
Status: COMPLETED | OUTPATIENT
Start: 2019-01-26 | End: 2019-01-26

## 2019-01-26 RX ORDER — HYDROMORPHONE HYDROCHLORIDE 1 MG/ML
0.4 INJECTION, SOLUTION INTRAMUSCULAR; INTRAVENOUS; SUBCUTANEOUS EVERY 5 MIN PRN
Status: DISCONTINUED | OUTPATIENT
Start: 2019-01-26 | End: 2019-01-26

## 2019-01-26 RX ORDER — MEPERIDINE HYDROCHLORIDE 25 MG/ML
12.5 INJECTION INTRAMUSCULAR; INTRAVENOUS; SUBCUTANEOUS AS NEEDED
Status: DISCONTINUED | OUTPATIENT
Start: 2019-01-26 | End: 2019-01-26

## 2019-01-26 RX ORDER — BUPIVACAINE HYDROCHLORIDE 5 MG/ML
INJECTION, SOLUTION EPIDURAL; INTRACAUDAL AS NEEDED
Status: DISCONTINUED | OUTPATIENT
Start: 2019-01-26 | End: 2019-01-26 | Stop reason: HOSPADM

## 2019-01-26 RX ORDER — ACETAMINOPHEN 500 MG
1000 TABLET ORAL ONCE
Status: DISCONTINUED | OUTPATIENT
Start: 2019-01-26 | End: 2019-01-26

## 2019-01-26 RX ORDER — HYDROCODONE BITARTRATE AND ACETAMINOPHEN 5; 325 MG/1; MG/1
1 TABLET ORAL EVERY 6 HOURS PRN
Qty: 30 TABLET | Refills: 0 | Status: SHIPPED | OUTPATIENT
Start: 2019-01-26 | End: 2019-02-04

## 2019-01-26 RX ORDER — CEFAZOLIN SODIUM/WATER 2 G/20 ML
SYRINGE (ML) INTRAVENOUS
Status: DISCONTINUED
Start: 2019-01-26 | End: 2019-01-26

## 2019-01-26 NOTE — BRIEF OP NOTE
Pre-Operative Diagnosis: Wound infection [T14. 8XXA, L08.9]     Post-Operative Diagnosis: Wound infection [T14. 8XXA, L08.9]      Procedure Performed:   Procedure(s):  Incision, drainage, and debridement of abdominal wound and vac dressing placement.      Mary Ortega

## 2019-01-26 NOTE — H&P
[facility]    Patient Name:  Rigo Carlos   YOB: 1965   Gender:  Male   Appt Date:  1/26/2019   Provider:  Girish Mendes MD   Insurance:  [facility] CORE/NAVIGATE         CHIEF COMPLAINT  No chief complaint on file.        PROBLEMS  R Rfl: 1   Budesonide-Formoterol Fumarate (SYMBICORT) 160-4.5 MCG/ACT Inhalation Aerosol INHALE 2 PUFFS BY MOUTH TWICE DAILY Disp: 3 Inhaler Rfl: 3   Fexofenadine HCl 180 MG Oral Tab Take 180 mg by mouth daily.  Disp:  Rfl:           Allergies Reviewed:    Eg rectum. He reports no fatigue. He reports no blood in the urine, no changes in urinary habits: initiating urination requires straining, no changes in urinary habits: delays in starting hesitancy, and no change in urine appearance. He reports no headache.  H

## 2019-01-27 NOTE — PROGRESS NOTES
Patient is here for a follow-up visit for wound recheck. He still complains of drainage and foul smell. Wound inspected. It tracks deep. It does indeed have a foul smell but no erythema or signs of infection.     For better care of the wound I recommend

## 2019-01-28 ENCOUNTER — TELEPHONE (OUTPATIENT)
Dept: SURGERY | Facility: CLINIC | Age: 54
End: 2019-01-28

## 2019-01-28 DIAGNOSIS — T14.8XXA WOUND INFECTION: Primary | ICD-10-CM

## 2019-01-28 DIAGNOSIS — L08.9 WOUND INFECTION: Primary | ICD-10-CM

## 2019-01-28 RX ORDER — SULFAMETHOXAZOLE AND TRIMETHOPRIM 800; 160 MG/1; MG/1
1 TABLET ORAL 2 TIMES DAILY
Qty: 14 TABLET | Refills: 0 | Status: SHIPPED | OUTPATIENT
Start: 2019-01-28 | End: 2019-02-04

## 2019-01-28 NOTE — TELEPHONE ENCOUNTER
Informed patient a 7day course of Bactrim was called into his pharmacy for wound infection.  Confirmed home health to visit today at 2pm and patient scheduled for post op appointment with Dr. Dori Kehr on 1/30/19 at 11am.

## 2019-01-28 NOTE — OPERATIVE REPORT
[address]    PATIENT'S NAME: Jason Nascimenot   ATTENDING PHYSICIAN: Mesha Guevara. Miguel George MD   OPERATING PHYSICIAN: Mesha Guevara.  Miguel George MD   PATIENT ACCOUNT#:   [id number]    LOCATION:  [address] [place] [address] 10  MEDICAL RECORD #:   [id number]

## 2019-01-30 ENCOUNTER — TELEPHONE (OUTPATIENT)
Dept: SURGERY | Facility: CLINIC | Age: 54
End: 2019-01-30

## 2019-01-30 ENCOUNTER — OFFICE VISIT (OUTPATIENT)
Dept: SURGERY | Facility: CLINIC | Age: 54
End: 2019-01-30
Payer: COMMERCIAL

## 2019-01-30 VITALS
HEART RATE: 112 BPM | DIASTOLIC BLOOD PRESSURE: 80 MMHG | SYSTOLIC BLOOD PRESSURE: 150 MMHG | RESPIRATION RATE: 18 BRPM | HEIGHT: 68 IN | TEMPERATURE: 98 F | OXYGEN SATURATION: 97 % | WEIGHT: 194 LBS | BODY MASS INDEX: 29.4 KG/M2

## 2019-01-30 DIAGNOSIS — Z51.89 VISIT FOR WOUND CHECK: Primary | ICD-10-CM

## 2019-01-30 PROCEDURE — 99024 POSTOP FOLLOW-UP VISIT: CPT | Performed by: SURGERY

## 2019-01-30 NOTE — TELEPHONE ENCOUNTER
Report given to Maeve Whitmore RN that wound vac dressing was removed today by Dr. Dori Kehr. Saline gauze dressing applied to wound.  Dr. Dori Kehr would like home health to visit patient on Saturday to re-apply portable wound vac and patient will follow up in clinic the

## 2019-01-30 NOTE — PROGRESS NOTES
[facility]    Patient Name:  Stacy Williamstown   YOB: 1965   Gender:  Male   Appt Date:  1/30/2019   Provider:  Tristan Bernheim, MD   Insurance:  [employer], DO   Ad Presents today for wound check.      Vital Signs:  /80 (BP Location: Left arm, Patient Position: Sitting, Cuff Size: adult)   Pulse 112   Temp 98.3 °F (36.8 °C) (Tympanic)   Resp 18   Ht 1.727 m (5' 8\")   Wt 88 kg (194 lb)   SpO2 97%   BMI 29.50 kg/m • Other and unspecified hyperlipidemia    • Visual impairment     glasses      Reviewed:  Past Surgical History:   Procedure Laterality Date   • Appendectomy  11/12/2018    interval appey   • Cholecystectomy  2008   • Colonoscopy N/A 12/7/2018    Procedure Follow Up:  1-2 weeks       Electronically Signed by:    Fan Razo. Jose Lewis MD FACS    Chief of Surgical Oncolgy  Department of [facility] Arvin Chapman, [facility], [address] [facility] AND [facility]  [address], [address]

## 2019-01-31 ENCOUNTER — ANESTHESIA EVENT (OUTPATIENT)
Dept: SURGERY | Facility: HOSPITAL | Age: 54
End: 2019-01-31
Payer: COMMERCIAL

## 2019-02-01 ENCOUNTER — APPOINTMENT (OUTPATIENT)
Dept: HEMATOLOGY/ONCOLOGY | Facility: HOSPITAL | Age: 54
End: 2019-02-01
Attending: INTERNAL MEDICINE
Payer: COMMERCIAL

## 2019-02-01 ENCOUNTER — TELEPHONE (OUTPATIENT)
Dept: SURGERY | Facility: CLINIC | Age: 54
End: 2019-02-01

## 2019-02-01 NOTE — TELEPHONE ENCOUNTER
Spoke with Shiv Ac to follow up regarding wound. She states he is doing well been doing saline gauze dressing changes. HHRN coming tomorrow to re-apply wound vac. Marisel Huynh with follow up for OV on 2/6/19 for wound assessment.

## 2019-02-04 ENCOUNTER — TELEPHONE (OUTPATIENT)
Dept: SURGERY | Facility: CLINIC | Age: 54
End: 2019-02-04

## 2019-02-04 DIAGNOSIS — L08.9 WOUND INFECTION: ICD-10-CM

## 2019-02-04 DIAGNOSIS — T14.8XXA WOUND INFECTION: ICD-10-CM

## 2019-02-04 RX ORDER — HYDROCODONE BITARTRATE AND ACETAMINOPHEN 5; 325 MG/1; MG/1
1 TABLET ORAL EVERY 6 HOURS PRN
Qty: 30 TABLET | Refills: 0 | Status: SHIPPED | OUTPATIENT
Start: 2019-02-04 | End: 2019-03-01 | Stop reason: ALTCHOICE

## 2019-02-06 ENCOUNTER — OFFICE VISIT (OUTPATIENT)
Dept: SURGERY | Facility: CLINIC | Age: 54
End: 2019-02-06
Payer: COMMERCIAL

## 2019-02-06 VITALS
DIASTOLIC BLOOD PRESSURE: 85 MMHG | OXYGEN SATURATION: 98 % | HEART RATE: 103 BPM | HEIGHT: 68 IN | SYSTOLIC BLOOD PRESSURE: 130 MMHG | RESPIRATION RATE: 18 BRPM | BODY MASS INDEX: 28.79 KG/M2 | TEMPERATURE: 98 F | WEIGHT: 190 LBS

## 2019-02-06 DIAGNOSIS — Z51.89 VISIT FOR WOUND CHECK: Primary | ICD-10-CM

## 2019-02-06 DIAGNOSIS — F41.9 ANXIETY: ICD-10-CM

## 2019-02-06 PROCEDURE — 99024 POSTOP FOLLOW-UP VISIT: CPT | Performed by: PHYSICIAN ASSISTANT

## 2019-02-06 RX ORDER — LORAZEPAM 0.5 MG/1
0.5 TABLET ORAL 2 TIMES DAILY PRN
Qty: 15 TABLET | Refills: 0 | Status: SHIPPED | OUTPATIENT
Start: 2019-02-06 | End: 2019-03-01

## 2019-02-07 NOTE — PROGRESS NOTES
[facility]    Patient Name:  Gunner Gomez   YOB: 1965   Gender:  Male   Appt Date:  2/6/2019   Provider:  Tootie Damon MD   Insurance:  T[employer] Louis 45, DO   Add Vital Signs:  /85 (BP Location: Left arm, Patient Position: Sitting, Cuff Size: adult)   Pulse 103   Temp 98.3 °F (36.8 °C) (Tympanic)   Resp 18   Ht 1.727 m (5' 8\")   Wt 86.2 kg (190 lb)   SpO2 98%   BMI 28.89 kg/m²      Medications Reviewed: • Extrinsic asthma, unspecified    • Gout    • Other and unspecified hyperlipidemia    • Visual impairment     glasses      Reviewed:  Past Surgical History:   Procedure Laterality Date   • Appendectomy  11/12/2018    interval appey   • Cholecystectomy  20 Wound vac taken down. Wound vac re-applied. Patient tolerated well     Assessment / Plan:  Abdominal wound    -Wound vac re-applied. Patient doing well. Inquiring when he can stop wound vac.  Stated if he would like to stop he can, however, wound healing ma

## 2019-02-11 ENCOUNTER — ANESTHESIA (OUTPATIENT)
Dept: SURGERY | Facility: HOSPITAL | Age: 54
End: 2019-02-11
Payer: COMMERCIAL

## 2019-02-11 ENCOUNTER — APPOINTMENT (OUTPATIENT)
Dept: GENERAL RADIOLOGY | Facility: HOSPITAL | Age: 54
End: 2019-02-11
Attending: SURGERY
Payer: COMMERCIAL

## 2019-02-11 ENCOUNTER — HOSPITAL ENCOUNTER (OUTPATIENT)
Facility: HOSPITAL | Age: 54
Setting detail: HOSPITAL OUTPATIENT SURGERY
Discharge: HOME | End: 2019-02-11
Attending: SURGERY | Admitting: SURGERY
Payer: COMMERCIAL

## 2019-02-11 ENCOUNTER — TELEPHONE (OUTPATIENT)
Dept: SURGERY | Facility: CLINIC | Age: 54
End: 2019-02-11

## 2019-02-11 VITALS
WEIGHT: 190 LBS | HEIGHT: 68 IN | RESPIRATION RATE: 16 BRPM | DIASTOLIC BLOOD PRESSURE: 65 MMHG | SYSTOLIC BLOOD PRESSURE: 114 MMHG | BODY MASS INDEX: 28.79 KG/M2 | HEART RATE: 71 BPM | TEMPERATURE: 98 F | OXYGEN SATURATION: 98 %

## 2019-02-11 DIAGNOSIS — C18.1 APPENDIX CARCINOMA (HCC): ICD-10-CM

## 2019-02-11 PROCEDURE — 05H533Z INSERTION OF INFUSION DEVICE INTO RIGHT SUBCLAVIAN VEIN, PERCUTANEOUS APPROACH: ICD-10-PCS | Performed by: SURGERY

## 2019-02-11 PROCEDURE — 71045 X-RAY EXAM CHEST 1 VIEW: CPT | Performed by: SURGERY

## 2019-02-11 PROCEDURE — 77001 FLUOROGUIDE FOR VEIN DEVICE: CPT | Performed by: SURGERY

## 2019-02-11 DEVICE — POWERPORT CLEARVUE ISP WITH SMOOTH SEPTUM, 8F POLYURETHANE CATHETER, INTERMEDIATE KIT
Type: IMPLANTABLE DEVICE | Site: CHEST | Status: FUNCTIONAL
Brand: POWERPORT CLEARVUE

## 2019-02-11 RX ORDER — ACETAMINOPHEN 500 MG
1000 TABLET ORAL ONCE AS NEEDED
Status: DISCONTINUED | OUTPATIENT
Start: 2019-02-11 | End: 2019-02-11

## 2019-02-11 RX ORDER — MEPERIDINE HYDROCHLORIDE 25 MG/ML
12.5 INJECTION INTRAMUSCULAR; INTRAVENOUS; SUBCUTANEOUS AS NEEDED
Status: DISCONTINUED | OUTPATIENT
Start: 2019-02-11 | End: 2019-02-11

## 2019-02-11 RX ORDER — NALOXONE HYDROCHLORIDE 0.4 MG/ML
80 INJECTION, SOLUTION INTRAMUSCULAR; INTRAVENOUS; SUBCUTANEOUS AS NEEDED
Status: DISCONTINUED | OUTPATIENT
Start: 2019-02-11 | End: 2019-02-11

## 2019-02-11 RX ORDER — HYDROMORPHONE HYDROCHLORIDE 1 MG/ML
0.4 INJECTION, SOLUTION INTRAMUSCULAR; INTRAVENOUS; SUBCUTANEOUS EVERY 5 MIN PRN
Status: DISCONTINUED | OUTPATIENT
Start: 2019-02-11 | End: 2019-02-11

## 2019-02-11 RX ORDER — BUPIVACAINE HYDROCHLORIDE 5 MG/ML
INJECTION, SOLUTION EPIDURAL; INTRACAUDAL AS NEEDED
Status: DISCONTINUED | OUTPATIENT
Start: 2019-02-11 | End: 2019-02-11 | Stop reason: HOSPADM

## 2019-02-11 RX ORDER — CEFAZOLIN SODIUM/WATER 2 G/20 ML
2 SYRINGE (ML) INTRAVENOUS ONCE
Status: DISCONTINUED | OUTPATIENT
Start: 2019-02-11 | End: 2019-02-11 | Stop reason: HOSPADM

## 2019-02-11 RX ORDER — HYDROCODONE BITARTRATE AND ACETAMINOPHEN 5; 325 MG/1; MG/1
1 TABLET ORAL AS NEEDED
Status: DISCONTINUED | OUTPATIENT
Start: 2019-02-11 | End: 2019-02-11

## 2019-02-11 RX ORDER — SODIUM CHLORIDE, SODIUM LACTATE, POTASSIUM CHLORIDE, CALCIUM CHLORIDE 600; 310; 30; 20 MG/100ML; MG/100ML; MG/100ML; MG/100ML
INJECTION, SOLUTION INTRAVENOUS CONTINUOUS
Status: DISCONTINUED | OUTPATIENT
Start: 2019-02-11 | End: 2019-02-11

## 2019-02-11 RX ORDER — DEXAMETHASONE SODIUM PHOSPHATE 4 MG/ML
4 VIAL (ML) INJECTION AS NEEDED
Status: DISCONTINUED | OUTPATIENT
Start: 2019-02-11 | End: 2019-02-11

## 2019-02-11 RX ORDER — MIDAZOLAM HYDROCHLORIDE 1 MG/ML
1 INJECTION INTRAMUSCULAR; INTRAVENOUS EVERY 5 MIN PRN
Status: DISCONTINUED | OUTPATIENT
Start: 2019-02-11 | End: 2019-02-11

## 2019-02-11 RX ORDER — HYDROCODONE BITARTRATE AND ACETAMINOPHEN 5; 325 MG/1; MG/1
1 TABLET ORAL EVERY 6 HOURS PRN
Qty: 10 TABLET | Refills: 0 | Status: SHIPPED | OUTPATIENT
Start: 2019-02-11 | End: 2019-02-21

## 2019-02-11 RX ORDER — ACETAMINOPHEN 500 MG
1000 TABLET ORAL ONCE
COMMUNITY
End: 2019-03-01 | Stop reason: ALTCHOICE

## 2019-02-11 RX ORDER — HYDROCODONE BITARTRATE AND ACETAMINOPHEN 5; 325 MG/1; MG/1
2 TABLET ORAL AS NEEDED
Status: DISCONTINUED | OUTPATIENT
Start: 2019-02-11 | End: 2019-02-11

## 2019-02-11 RX ORDER — LIDOCAINE HYDROCHLORIDE AND EPINEPHRINE 10; 10 MG/ML; UG/ML
INJECTION, SOLUTION INFILTRATION; PERINEURAL AS NEEDED
Status: DISCONTINUED | OUTPATIENT
Start: 2019-02-11 | End: 2019-02-11 | Stop reason: HOSPADM

## 2019-02-11 RX ORDER — ACETAMINOPHEN 500 MG
1000 TABLET ORAL ONCE
Status: DISCONTINUED | OUTPATIENT
Start: 2019-02-11 | End: 2019-02-11

## 2019-02-11 RX ORDER — HEPARIN SODIUM 5000 [USP'U]/ML
5000 INJECTION, SOLUTION INTRAVENOUS; SUBCUTANEOUS ONCE
Status: COMPLETED | OUTPATIENT
Start: 2019-02-11 | End: 2019-02-11

## 2019-02-11 RX ORDER — ONDANSETRON 2 MG/ML
4 INJECTION INTRAMUSCULAR; INTRAVENOUS AS NEEDED
Status: DISCONTINUED | OUTPATIENT
Start: 2019-02-11 | End: 2019-02-11

## 2019-02-11 NOTE — ANESTHESIA POSTPROCEDURE EVALUATION
[address] Patient Status:  Hospital Outpatient Surgery   Age/Gender 62year old male MRN [id number]   [facility] SURGERY Attending Kate Chen MD   [facility] Day # 0 PCP Lizzette Sandhu DO       Anesthesia Post-op

## 2019-02-11 NOTE — ANESTHESIA PREPROCEDURE EVALUATION
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   Temp: 98 FENOFIBRATE 160 MG Oral Tab TAKE 1 TABLET(160 MG) BY MOUTH DAILY Disp: 90 tablet Rfl: 0   Dakins 0.125 % External Solution Apply 1 dakin soak gauze to wound daily Disp: 1 Bottle Rfl: 0   PRAVASTATIN SODIUM 20 MG Oral Tab TAKE 1 TABLET(20 MG) BY MOUTH JAMIE OTHER SURGICAL HISTORY  12/17/2018    Reopening of recent laparotomy, repair of dehiscence   • OTHER SURGICAL HISTORY  12/11/2018    IFEPJ-Rerfibtcdhry-tvvgjjzb resection of the terminal ileum with partial colon resection and ileocolic anastomosis, Periton patient and spouse                Present on Admission:  **None**

## 2019-02-11 NOTE — H&P
History & Physical Examination    Patient Name: Archana Jacobs  MRN: [id number]  CSN: [id number]  YOB: 1965    Diagnosis: mucinous adenocarcinoma of the appendix    Present Illness: *52-year-old gentleman who was recently admitted for acut back to OR on 1-26-19 for wound debridement  He is admitted today for port a cath placement  The risks, benefits, complications, possible outcomes and alternatives of the procedure were explained to the patient in detail.    Expected postoperative pain, rec (six) hours as needed for Pain. Disp: 30 tablet Rfl: 0 Taking   Fexofenadine HCl 180 MG Oral Tab Take 180 mg by mouth as needed.    Disp:  Rfl:  More than a month at Unknown time       Current Facility-Administered Medications:  lactated ringers infusion  I abdominal wall wound, to include soft tissue and fascia, Vacuum dressing application     Family History   Problem Relation Age of Onset   • Lipids Maternal Grandfather      Social History    Tobacco Use      Smoking status: Former Smoker        Packs/day:

## 2019-02-11 NOTE — OPERATIVE REPORT
[facility]  Operative Note    Suresh Jamilah Location: OR   CSN [id number] MRN [id number]   Admission Date 2/11/2019 Operation Date 2/11/2019   Attending Physician Joseph Leggett MD Operating Physician Opal Diaz MD     Date of procedure:   2 scheduled for today  Preoperative lab work including CEA is ordered  He will require preoperative screening colonoscopy-this is scheduled for December 7 with Dr. Carmelo Echevarria weeks after laparoscopic appendectomy. Antonina Cooper has not had prior screening colonoscopy i was cannulated. The guidewire was passed with Seldinger technique and fluoroscopy. Again using local anesthesia a small subcutaneous pocket was fashioned on the anterior chest wall and the catheter was tunneled.  The sheath and dilator were passed over the

## 2019-02-11 NOTE — TELEPHONE ENCOUNTER
LVMM to discuss wound healing and to confirm f/u 2/ 15/19 with Love Fung PA-C. Offered to see patient sooner if needed.

## 2019-02-11 NOTE — OR NURSING
Patients chest xray results not showing in Epic at this time. Called down to xray and results were given to me over the phone - this RN was told they are not crossing over at this time.  Xray results state portacath in SVC from right side, no pleural effusi

## 2019-02-15 ENCOUNTER — OFFICE VISIT (OUTPATIENT)
Dept: HEMATOLOGY/ONCOLOGY | Facility: HOSPITAL | Age: 54
End: 2019-02-15
Attending: INTERNAL MEDICINE
Payer: COMMERCIAL

## 2019-02-15 ENCOUNTER — OFFICE VISIT (OUTPATIENT)
Dept: SURGERY | Facility: CLINIC | Age: 54
End: 2019-02-15
Payer: COMMERCIAL

## 2019-02-15 VITALS
HEART RATE: 97 BPM | OXYGEN SATURATION: 98 % | TEMPERATURE: 98 F | SYSTOLIC BLOOD PRESSURE: 128 MMHG | DIASTOLIC BLOOD PRESSURE: 85 MMHG | WEIGHT: 191.38 LBS | HEIGHT: 68.11 IN | BODY MASS INDEX: 29.01 KG/M2 | RESPIRATION RATE: 20 BRPM

## 2019-02-15 DIAGNOSIS — C18.1 PRIMARY APPENDICEAL ADENOCARCINOMA (HCC): Primary | ICD-10-CM

## 2019-02-15 DIAGNOSIS — Z51.89 VISIT FOR WOUND CHECK: Primary | ICD-10-CM

## 2019-02-15 LAB
ALBUMIN SERPL-MCNC: 3.3 G/DL (ref 3.4–5)
ALBUMIN/GLOB SERPL: 0.8 {RATIO} (ref 1–2)
ALP LIVER SERPL-CCNC: 66 U/L (ref 45–117)
ALT SERPL-CCNC: 18 U/L (ref 16–61)
ANION GAP SERPL CALC-SCNC: 7 MMOL/L (ref 0–18)
AST SERPL-CCNC: 14 U/L (ref 15–37)
BASOPHILS # BLD AUTO: 0.03 X10(3) UL (ref 0–0.2)
BASOPHILS NFR BLD AUTO: 0.5 %
BILIRUB SERPL-MCNC: 0.2 MG/DL (ref 0.1–2)
BUN BLD-MCNC: 14 MG/DL (ref 7–18)
BUN/CREAT SERPL: 19.4 (ref 10–20)
CALCIUM BLD-MCNC: 9.1 MG/DL (ref 8.5–10.1)
CHLORIDE SERPL-SCNC: 105 MMOL/L (ref 98–107)
CO2 SERPL-SCNC: 27 MMOL/L (ref 21–32)
CREAT BLD-MCNC: 0.72 MG/DL (ref 0.7–1.3)
DEPRECATED RDW RBC AUTO: 44.6 FL (ref 35.1–46.3)
EOSINOPHIL # BLD AUTO: 0.33 X10(3) UL (ref 0–0.7)
EOSINOPHIL NFR BLD AUTO: 5.1 %
ERYTHROCYTE [DISTWIDTH] IN BLOOD BY AUTOMATED COUNT: 14.6 % (ref 11–15)
GLOBULIN PLAS-MCNC: 4.3 G/DL (ref 2.8–4.4)
GLUCOSE BLD-MCNC: 92 MG/DL (ref 70–99)
HCT VFR BLD AUTO: 32.6 % (ref 39–53)
HGB BLD-MCNC: 10.4 G/DL (ref 13–17.5)
IMM GRANULOCYTES # BLD AUTO: 0.02 X10(3) UL (ref 0–1)
IMM GRANULOCYTES NFR BLD: 0.3 %
LYMPHOCYTES # BLD AUTO: 1.4 X10(3) UL (ref 1–4)
LYMPHOCYTES NFR BLD AUTO: 21.5 %
M PROTEIN MFR SERPL ELPH: 7.6 G/DL (ref 6.4–8.2)
MCH RBC QN AUTO: 26.7 PG (ref 26–34)
MCHC RBC AUTO-ENTMCNC: 31.9 G/DL (ref 31–37)
MCV RBC AUTO: 83.8 FL (ref 80–100)
MONOCYTES # BLD AUTO: 0.49 X10(3) UL (ref 0.1–1)
MONOCYTES NFR BLD AUTO: 7.5 %
NEUTROPHILS # BLD AUTO: 4.23 X10 (3) UL (ref 1.5–7.7)
NEUTROPHILS # BLD AUTO: 4.23 X10(3) UL (ref 1.5–7.7)
NEUTROPHILS NFR BLD AUTO: 65.1 %
OSMOLALITY SERPL CALC.SUM OF ELEC: 288 MOSM/KG (ref 275–295)
PLATELET # BLD AUTO: 249 10(3)UL (ref 150–450)
POTASSIUM SERPL-SCNC: 3.5 MMOL/L (ref 3.5–5.1)
RBC # BLD AUTO: 3.89 X10(6)UL (ref 4.3–5.7)
SODIUM SERPL-SCNC: 139 MMOL/L (ref 136–145)
WBC # BLD AUTO: 6.5 X10(3) UL (ref 4–11)

## 2019-02-15 PROCEDURE — 85025 COMPLETE CBC W/AUTO DIFF WBC: CPT

## 2019-02-15 PROCEDURE — 96415 CHEMO IV INFUSION ADDL HR: CPT

## 2019-02-15 PROCEDURE — 80053 COMPREHEN METABOLIC PANEL: CPT

## 2019-02-15 PROCEDURE — 96413 CHEMO IV INFUSION 1 HR: CPT

## 2019-02-15 PROCEDURE — 96368 THER/DIAG CONCURRENT INF: CPT

## 2019-02-15 PROCEDURE — 99024 POSTOP FOLLOW-UP VISIT: CPT | Performed by: PHYSICIAN ASSISTANT

## 2019-02-15 PROCEDURE — 99214 OFFICE O/P EST MOD 30 MIN: CPT | Performed by: NURSE PRACTITIONER

## 2019-02-15 PROCEDURE — 96411 CHEMO IV PUSH ADDL DRUG: CPT

## 2019-02-15 PROCEDURE — 96375 TX/PRO/DX INJ NEW DRUG ADDON: CPT

## 2019-02-15 RX ORDER — FLUOROURACIL 50 MG/ML
400 INJECTION, SOLUTION INTRAVENOUS ONCE
Status: COMPLETED | OUTPATIENT
Start: 2019-02-15 | End: 2019-02-15

## 2019-02-15 RX ORDER — FLUOROURACIL 50 MG/ML
400 INJECTION, SOLUTION INTRAVENOUS ONCE
Status: CANCELLED | OUTPATIENT
Start: 2019-02-15

## 2019-02-15 RX ORDER — SODIUM CHLORIDE 0.9 % (FLUSH) 0.9 %
10 SYRINGE (ML) INJECTION ONCE
Status: CANCELLED | OUTPATIENT
Start: 2019-02-15

## 2019-02-15 RX ORDER — PROCHLORPERAZINE MALEATE 10 MG
10 TABLET ORAL EVERY 6 HOURS PRN
Status: CANCELLED | OUTPATIENT
Start: 2019-02-15

## 2019-02-15 RX ORDER — METOCLOPRAMIDE HYDROCHLORIDE 5 MG/ML
10 INJECTION INTRAMUSCULAR; INTRAVENOUS EVERY 6 HOURS PRN
Status: CANCELLED | OUTPATIENT
Start: 2019-02-15

## 2019-02-15 RX ORDER — LORAZEPAM 0.5 MG/1
TABLET ORAL EVERY 4 HOURS PRN
Status: CANCELLED | OUTPATIENT
Start: 2019-02-15

## 2019-02-15 RX ORDER — FLUOROURACIL 50 MG/ML
2400 INJECTION, SOLUTION INTRAVENOUS CONTINUOUS
Status: DISCONTINUED | OUTPATIENT
Start: 2019-02-15 | End: 2019-02-15

## 2019-02-15 RX ORDER — ONDANSETRON HYDROCHLORIDE 8 MG/1
8 TABLET, FILM COATED ORAL EVERY 8 HOURS PRN
Qty: 30 TABLET | Refills: 3 | Status: SHIPPED | OUTPATIENT
Start: 2019-02-15 | End: 2021-09-20 | Stop reason: ALTCHOICE

## 2019-02-15 RX ORDER — ONDANSETRON 2 MG/ML
8 INJECTION INTRAMUSCULAR; INTRAVENOUS EVERY 6 HOURS PRN
Status: CANCELLED | OUTPATIENT
Start: 2019-02-15

## 2019-02-15 RX ORDER — PROCHLORPERAZINE MALEATE 10 MG
10 TABLET ORAL EVERY 6 HOURS PRN
Qty: 30 TABLET | Refills: 3 | Status: SHIPPED | OUTPATIENT
Start: 2019-02-15 | End: 2021-09-20 | Stop reason: ALTCHOICE

## 2019-02-15 RX ORDER — FLUOROURACIL 50 MG/ML
2400 INJECTION, SOLUTION INTRAVENOUS CONTINUOUS
Status: CANCELLED | OUTPATIENT
Start: 2019-02-15

## 2019-02-15 RX ORDER — LORAZEPAM 2 MG/ML
INJECTION INTRAMUSCULAR EVERY 4 HOURS PRN
Status: CANCELLED | OUTPATIENT
Start: 2019-02-15

## 2019-02-15 RX ADMIN — FLUOROURACIL 4800 MG: 50 INJECTION, SOLUTION INTRAVENOUS at 14:34:00

## 2019-02-15 RX ADMIN — FLUOROURACIL 800 MG: 50 INJECTION, SOLUTION INTRAVENOUS at 14:24:00

## 2019-02-15 NOTE — PROGRESS NOTES
ANP Visit Note    Patient Name: Dian Lacy   YOB: 1965   Medical Record Number: [id number]   CSN: [id number]   Date of visit: 2/15/2019       Chief Complaint/Reason for Visit:  Follow up / Carcinoma of the appendix     Oncologic Histor Surgical History:  Past Surgical History:   Procedure Laterality Date   • APPENDECTOMY  11/12/2018    interval appey   • CATHETER INSERTION PORT-A-CATH Right 2/11/2019    Performed by Sulma Mcdaniel MD at [address] [place] [address] OR   • CHOLECYSTECTOMY  2008   • CO Not on file        Inability: Not on file      Transportation needs:        Medical: Not on file        Non-medical: Not on file    Tobacco Use      Smoking status: Former Smoker        Packs/day: 1.00        Years: 25.00        Pack years: 25        Types Disp: 30 tablet, Rfl: 3  •  Prochlorperazine Maleate (COMPAZINE) 10 mg tablet, Take 1 tablet (10 mg total) by mouth every 6 (six) hours as needed for Nausea., Disp: 30 tablet, Rfl: 3  •  acetaminophen 500 MG Oral Tab, Take 1,000 mg by mouth one time. , Disp oriented x 3, not in acute distress.   Vital Signs:   Vitals History 2/15/2019   /85   BP Location Right arm   Patient Position Sitting   Cuff Size adult   Pulse 97   Resp 20   Temp 97.7   SpO2 98   Weight 191 lbs 6 oz   Height 68.11   BODY MASS INDEX Hospital Surgery    Received:            01/28/2019 08:05 AM          Pathologist:           Marylu Becker MD                                                             Specimens:   A) - Abdomen, WOUND CONTENT FOREIGN BODY VS FAT NECROSIS

## 2019-02-15 NOTE — PROGRESS NOTES
[facility]    Patient Name:  Ora Wan   YOB: 1965   Gender:  Male   Appt Date:  2/15/2019   Provider:  Henrik Amaya MD   Insurance:  TOM Louis 45, DO   Ad Presents today for wound check. Vital Signs: There were no vitals taken for this visit. Medications Reviewed:    Current Outpatient Medications:   •  acetaminophen 500 MG Oral Tab, Take 1,000 mg by mouth one time. , Disp: , Rfl:   •  HYDROcodone-ac • [facility] 11/2018    Appendix cancer    • Disorder of thyroid    • Essential hypertension    • Extrinsic asthma, unspecified    • Gout    • Other and unspecified hyperlipidemia    • Visual impairment     glasses      Reviewed:  Past Surgical History: Abdomen: Wound granulating nicely. The wound at its greatest depth is 1 cm, length 4 cm, width 2 cm. Musculoskeletal: no edema. Procedure(s): Wound vac taken down. Wet to dry dressing applied.  Patient tolerated well     Assessment / Plan:  Abdominal

## 2019-02-17 ENCOUNTER — NURSE ONLY (OUTPATIENT)
Dept: HEMATOLOGY/ONCOLOGY | Facility: HOSPITAL | Age: 54
End: 2019-02-17
Attending: INTERNAL MEDICINE
Payer: COMMERCIAL

## 2019-02-17 DIAGNOSIS — C18.1 PRIMARY APPENDICEAL ADENOCARCINOMA (HCC): Primary | ICD-10-CM

## 2019-02-17 PROCEDURE — 96523 IRRIG DRUG DELIVERY DEVICE: CPT

## 2019-02-17 RX ORDER — SODIUM CHLORIDE 0.9 % (FLUSH) 0.9 %
10 SYRINGE (ML) INJECTION ONCE
Status: CANCELLED | OUTPATIENT
Start: 2019-02-17

## 2019-02-17 RX ORDER — SODIUM CHLORIDE 0.9 % (FLUSH) 0.9 %
10 SYRINGE (ML) INJECTION ONCE
Status: COMPLETED | OUTPATIENT
Start: 2019-02-17 | End: 2019-02-17

## 2019-02-17 RX ADMIN — SODIUM CHLORIDE 0.9 % (FLUSH) 10 ML: 0.9 % SYRINGE (ML) INJECTION at 11:00:00

## 2019-02-18 ENCOUNTER — TELEPHONE (OUTPATIENT)
Dept: HEMATOLOGY/ONCOLOGY | Facility: HOSPITAL | Age: 54
End: 2019-02-18

## 2019-02-18 NOTE — TELEPHONE ENCOUNTER
Date of Treatment: 2/15/19                                 Chemo: FOLFOX    Comments: Spoke with patient. He does still have a little bit of lingering neuropathy to fingers- forgot when he took dogs out this am in the cold.  He denies any nausea or vomiting

## 2019-02-21 ENCOUNTER — TELEPHONE (OUTPATIENT)
Dept: SURGERY | Facility: CLINIC | Age: 54
End: 2019-02-21

## 2019-02-21 DIAGNOSIS — C18.1: Primary | ICD-10-CM

## 2019-02-21 RX ORDER — HYDROCODONE BITARTRATE AND ACETAMINOPHEN 5; 325 MG/1; MG/1
1 TABLET ORAL EVERY 6 HOURS PRN
Qty: 30 TABLET | Refills: 0 | Status: SHIPPED | OUTPATIENT
Start: 2019-02-21 | End: 2019-03-01 | Stop reason: ALTCHOICE

## 2019-02-21 NOTE — TELEPHONE ENCOUNTER
Call from pt's wife stating that her  would like a refill of his Hydrocodone. Last Rf was on 02/04/19 #30.  Informed pt's wife that per Cass Davis PA-C she will refill the prescription but after this one he will need to speak with Dr. Nancee Cabot off

## 2019-02-22 ENCOUNTER — APPOINTMENT (OUTPATIENT)
Dept: HEMATOLOGY/ONCOLOGY | Facility: HOSPITAL | Age: 54
End: 2019-02-22
Attending: INTERNAL MEDICINE
Payer: COMMERCIAL

## 2019-03-01 ENCOUNTER — APPOINTMENT (OUTPATIENT)
Dept: HEMATOLOGY/ONCOLOGY | Facility: HOSPITAL | Age: 54
End: 2019-03-01
Attending: INTERNAL MEDICINE
Payer: COMMERCIAL

## 2019-03-01 ENCOUNTER — DIETICIAN VISIT (OUTPATIENT)
Dept: HEMATOLOGY/ONCOLOGY | Facility: HOSPITAL | Age: 54
End: 2019-03-01

## 2019-03-01 VITALS
HEIGHT: 68.11 IN | TEMPERATURE: 97 F | RESPIRATION RATE: 18 BRPM | HEART RATE: 76 BPM | SYSTOLIC BLOOD PRESSURE: 124 MMHG | BODY MASS INDEX: 29.13 KG/M2 | DIASTOLIC BLOOD PRESSURE: 77 MMHG | OXYGEN SATURATION: 98 % | WEIGHT: 192.19 LBS

## 2019-03-01 DIAGNOSIS — C18.1 PRIMARY APPENDICEAL ADENOCARCINOMA (HCC): Primary | ICD-10-CM

## 2019-03-01 DIAGNOSIS — F41.9 ANXIETY: ICD-10-CM

## 2019-03-01 LAB
ALBUMIN SERPL-MCNC: 3.2 G/DL (ref 3.4–5)
ALBUMIN/GLOB SERPL: 0.8 {RATIO} (ref 1–2)
ALP LIVER SERPL-CCNC: 72 U/L (ref 45–117)
ALT SERPL-CCNC: 19 U/L (ref 16–61)
ANION GAP SERPL CALC-SCNC: 8 MMOL/L (ref 0–18)
AST SERPL-CCNC: 14 U/L (ref 15–37)
BASOPHILS # BLD AUTO: 0.03 X10(3) UL (ref 0–0.2)
BASOPHILS NFR BLD AUTO: 0.6 %
BILIRUB SERPL-MCNC: 0.2 MG/DL (ref 0.1–2)
BUN BLD-MCNC: 10 MG/DL (ref 7–18)
BUN/CREAT SERPL: 11.9 (ref 10–20)
CALCIUM BLD-MCNC: 9 MG/DL (ref 8.5–10.1)
CHLORIDE SERPL-SCNC: 108 MMOL/L (ref 98–107)
CO2 SERPL-SCNC: 23 MMOL/L (ref 21–32)
CREAT BLD-MCNC: 0.84 MG/DL (ref 0.7–1.3)
DEPRECATED RDW RBC AUTO: 48.5 FL (ref 35.1–46.3)
EOSINOPHIL # BLD AUTO: 0.24 X10(3) UL (ref 0–0.7)
EOSINOPHIL NFR BLD AUTO: 4.9 %
ERYTHROCYTE [DISTWIDTH] IN BLOOD BY AUTOMATED COUNT: 15.8 % (ref 11–15)
GLOBULIN PLAS-MCNC: 4.2 G/DL (ref 2.8–4.4)
GLUCOSE BLD-MCNC: 117 MG/DL (ref 70–99)
HCT VFR BLD AUTO: 35.2 % (ref 39–53)
HGB BLD-MCNC: 11.2 G/DL (ref 13–17.5)
IMM GRANULOCYTES # BLD AUTO: 0.01 X10(3) UL (ref 0–1)
IMM GRANULOCYTES NFR BLD: 0.2 %
LYMPHOCYTES # BLD AUTO: 1.34 X10(3) UL (ref 1–4)
LYMPHOCYTES NFR BLD AUTO: 27.2 %
M PROTEIN MFR SERPL ELPH: 7.4 G/DL (ref 6.4–8.2)
MCH RBC QN AUTO: 27.2 PG (ref 26–34)
MCHC RBC AUTO-ENTMCNC: 31.8 G/DL (ref 31–37)
MCV RBC AUTO: 85.4 FL (ref 80–100)
MONOCYTES # BLD AUTO: 0.39 X10(3) UL (ref 0.1–1)
MONOCYTES NFR BLD AUTO: 7.9 %
NEUTROPHILS # BLD AUTO: 2.92 X10 (3) UL (ref 1.5–7.7)
NEUTROPHILS # BLD AUTO: 2.92 X10(3) UL (ref 1.5–7.7)
NEUTROPHILS NFR BLD AUTO: 59.2 %
OSMOLALITY SERPL CALC.SUM OF ELEC: 288 MOSM/KG (ref 275–295)
PLATELET # BLD AUTO: 197 10(3)UL (ref 150–450)
POTASSIUM SERPL-SCNC: 4 MMOL/L (ref 3.5–5.1)
RBC # BLD AUTO: 4.12 X10(6)UL (ref 4.3–5.7)
SODIUM SERPL-SCNC: 139 MMOL/L (ref 136–145)
WBC # BLD AUTO: 4.9 X10(3) UL (ref 4–11)

## 2019-03-01 PROCEDURE — 85025 COMPLETE CBC W/AUTO DIFF WBC: CPT

## 2019-03-01 PROCEDURE — 99214 OFFICE O/P EST MOD 30 MIN: CPT | Performed by: NURSE PRACTITIONER

## 2019-03-01 PROCEDURE — 96415 CHEMO IV INFUSION ADDL HR: CPT

## 2019-03-01 PROCEDURE — 96411 CHEMO IV PUSH ADDL DRUG: CPT

## 2019-03-01 PROCEDURE — 96413 CHEMO IV INFUSION 1 HR: CPT

## 2019-03-01 PROCEDURE — 96375 TX/PRO/DX INJ NEW DRUG ADDON: CPT

## 2019-03-01 PROCEDURE — 80053 COMPREHEN METABOLIC PANEL: CPT

## 2019-03-01 PROCEDURE — 96368 THER/DIAG CONCURRENT INF: CPT

## 2019-03-01 RX ORDER — LORAZEPAM 0.5 MG/1
TABLET ORAL EVERY 4 HOURS PRN
Status: CANCELLED | OUTPATIENT
Start: 2019-03-01

## 2019-03-01 RX ORDER — PROCHLORPERAZINE MALEATE 10 MG
10 TABLET ORAL EVERY 6 HOURS PRN
Status: CANCELLED | OUTPATIENT
Start: 2019-03-01

## 2019-03-01 RX ORDER — LORAZEPAM 0.5 MG/1
0.5 TABLET ORAL 2 TIMES DAILY PRN
Qty: 30 TABLET | Refills: 0 | Status: SHIPPED | OUTPATIENT
Start: 2019-03-01 | End: 2019-12-06 | Stop reason: ALTCHOICE

## 2019-03-01 RX ORDER — ONDANSETRON 2 MG/ML
8 INJECTION INTRAMUSCULAR; INTRAVENOUS EVERY 6 HOURS PRN
Status: CANCELLED | OUTPATIENT
Start: 2019-03-01

## 2019-03-01 RX ORDER — FLUOROURACIL 50 MG/ML
2400 INJECTION, SOLUTION INTRAVENOUS CONTINUOUS
Status: DISCONTINUED | OUTPATIENT
Start: 2019-03-01 | End: 2019-03-01

## 2019-03-01 RX ORDER — FLUOROURACIL 50 MG/ML
400 INJECTION, SOLUTION INTRAVENOUS ONCE
Status: COMPLETED | OUTPATIENT
Start: 2019-03-01 | End: 2019-03-01

## 2019-03-01 RX ORDER — METOCLOPRAMIDE HYDROCHLORIDE 5 MG/ML
10 INJECTION INTRAMUSCULAR; INTRAVENOUS EVERY 6 HOURS PRN
Status: CANCELLED | OUTPATIENT
Start: 2019-03-01

## 2019-03-01 RX ORDER — FLUOROURACIL 50 MG/ML
400 INJECTION, SOLUTION INTRAVENOUS ONCE
Status: CANCELLED | OUTPATIENT
Start: 2019-03-01

## 2019-03-01 RX ORDER — HYDROCODONE BITARTRATE AND ACETAMINOPHEN 10; 325 MG/1; MG/1
1 TABLET ORAL EVERY 6 HOURS PRN
Qty: 30 TABLET | Refills: 0 | Status: SHIPPED | OUTPATIENT
Start: 2019-03-01 | End: 2019-04-12

## 2019-03-01 RX ORDER — FLUOROURACIL 50 MG/ML
2400 INJECTION, SOLUTION INTRAVENOUS CONTINUOUS
Status: CANCELLED | OUTPATIENT
Start: 2019-03-01

## 2019-03-01 RX ORDER — LORAZEPAM 2 MG/ML
INJECTION INTRAMUSCULAR EVERY 4 HOURS PRN
Status: CANCELLED | OUTPATIENT
Start: 2019-03-01

## 2019-03-01 RX ADMIN — FLUOROURACIL 4800 MG: 50 INJECTION, SOLUTION INTRAVENOUS at 12:14:00

## 2019-03-01 RX ADMIN — FLUOROURACIL 800 MG: 50 INJECTION, SOLUTION INTRAVENOUS at 12:06:00

## 2019-03-01 NOTE — PROGRESS NOTES
Here for C2,D1 FOLFOX for appendiceal CA.   Arrives Ambulating independently, accompanied by Spouse           Modifications in dose or schedule: No     Frequency of blood return and site check throughout administration: Prior to administration and Every 2-3

## 2019-03-01 NOTE — PROGRESS NOTES
BRIEF NUTRITION NOTE: RD had arranged to meet w/ pt during chemo tx today; however, PSR noted pt wife canceled apt noting pt's niece is a dietitian. No further f/u planned.

## 2019-03-01 NOTE — PROGRESS NOTES
ANP Visit Note    Patient Name: Zac Cuellra   YOB: 1965   Medical Record Number: [id number]   CSN: [id number]   Date of visit: 3/1/2019     Chief Complaint/Reason for Visit: Follow up / Carcinoma of the appendix     Oncologic History: 5 thyroid    • Essential hypertension    • Extrinsic asthma, unspecified    • Gout    • Other and unspecified hyperlipidemia    • Visual impairment     glasses       Surgical History:  Past Surgical History:   Procedure Laterality Date   • APPENDECTOMY  11/1 education level: Not on file    Occupational History      Not on file    Social Needs      Financial resource strain: Not on file      Food insecurity:        Worry: Not on file        Inability: Not on file      Transportation needs:        Medical: Not o file      Medications:    Current Outpatient Medications:   •  HYDROcodone-acetaminophen  MG Oral Tab, Take 1 tablet by mouth every 6 (six) hours as needed for Pain., Disp: 30 tablet, Rfl: 0  •  LORazepam 0.5 MG Oral Tab, Take 1 tablet (0.5 mg total) taking differently: as needed. INHALE 2 PUFFS BY MOUTH TWICE DAILY ), Disp: 3 Inhaler, Rfl: 3    Review of Systems:  A comprehensive 14 point review of systems was completed. Pertinent positives and negatives noted in the the HPI.     Performance Status: E Status: Final  Calcium, Total                                Date: 02/15/2019  Value: 9.1         Ref range: 8.5 - 10.1 mg/dL   Status: Final  Calculated Osmolality                         Date: 02/15/2019  Value: 288         Ref range: 275 - 295 mOsm/kg Date: 02/15/2019  Value: 32.6*       Ref range: 39.0 - 53.0 %      Status: Final  PLT                                           Date: 02/15/2019  Value: 249.0       Ref range: 150.0 - 450.0 10*  Status: Final  MCV Date: 02/15/2019  Value: 21.5        Ref range: %                  Status: Final  Monocyte %                                    Date: 02/15/2019  Value: 7.5         Ref range: %                  Status: Final  Eosinophil %

## 2019-03-03 ENCOUNTER — NURSE ONLY (OUTPATIENT)
Dept: HEMATOLOGY/ONCOLOGY | Facility: HOSPITAL | Age: 54
End: 2019-03-03
Attending: INTERNAL MEDICINE
Payer: COMMERCIAL

## 2019-03-03 PROCEDURE — 96523 IRRIG DRUG DELIVERY DEVICE: CPT

## 2019-03-03 NOTE — PROGRESS NOTES
Education Record    Learner:  Patient and Spouse    Disease / Diagnosis:    Barriers / Limitations:  None   Comments:    Method:  Discussion   Comments:    General Topics:  Procedure and Plan of care reviewed   Comments:    Outcome:  Shows understanding

## 2019-03-08 ENCOUNTER — TELEPHONE (OUTPATIENT)
Dept: HEMATOLOGY/ONCOLOGY | Facility: HOSPITAL | Age: 54
End: 2019-03-08

## 2019-03-08 DIAGNOSIS — C18.1 PRIMARY APPENDICEAL ADENOCARCINOMA (HCC): Primary | ICD-10-CM

## 2019-03-08 RX ORDER — HYDROCODONE BITARTRATE AND ACETAMINOPHEN 10; 325 MG/1; MG/1
1 TABLET ORAL EVERY 6 HOURS PRN
Qty: 90 TABLET | Refills: 0 | Status: SHIPPED | OUTPATIENT
Start: 2019-03-08 | End: 2019-03-29

## 2019-03-08 NOTE — TELEPHONE ENCOUNTER
Pt continues to have surgical pain. He is taking Norco  every 6- 7 hours. He said this is helping, especially at night with sleep. Reviewed with KEI Tan. RX written and left at . Pt has appointment next week.  Spoke with wife and aware

## 2019-03-13 ENCOUNTER — TELEPHONE (OUTPATIENT)
Dept: HEMATOLOGY/ONCOLOGY | Facility: HOSPITAL | Age: 54
End: 2019-03-13

## 2019-03-13 NOTE — TELEPHONE ENCOUNTER
Patient has a gout flare up in big toe and normally takes Prednisone. Is this ok to take while on chemo?

## 2019-03-15 ENCOUNTER — OFFICE VISIT (OUTPATIENT)
Dept: HEMATOLOGY/ONCOLOGY | Facility: HOSPITAL | Age: 54
End: 2019-03-15
Attending: INTERNAL MEDICINE
Payer: COMMERCIAL

## 2019-03-15 VITALS
RESPIRATION RATE: 16 BRPM | SYSTOLIC BLOOD PRESSURE: 118 MMHG | BODY MASS INDEX: 28.89 KG/M2 | OXYGEN SATURATION: 94 % | HEIGHT: 68.11 IN | HEART RATE: 64 BPM | WEIGHT: 190.63 LBS | DIASTOLIC BLOOD PRESSURE: 76 MMHG | TEMPERATURE: 97 F

## 2019-03-15 DIAGNOSIS — D50.0 IRON DEFICIENCY ANEMIA DUE TO CHRONIC BLOOD LOSS: ICD-10-CM

## 2019-03-15 DIAGNOSIS — C18.1 PRIMARY APPENDICEAL ADENOCARCINOMA (HCC): ICD-10-CM

## 2019-03-15 DIAGNOSIS — D64.9 ANEMIA, UNSPECIFIED TYPE: ICD-10-CM

## 2019-03-15 DIAGNOSIS — C18.1 PRIMARY APPENDICEAL ADENOCARCINOMA (HCC): Primary | ICD-10-CM

## 2019-03-15 DIAGNOSIS — D64.9 ANEMIA, UNSPECIFIED TYPE: Primary | ICD-10-CM

## 2019-03-15 PROBLEM — D50.9 IRON DEFICIENCY ANEMIA: Status: ACTIVE | Noted: 2019-03-15

## 2019-03-15 LAB
ALBUMIN SERPL-MCNC: 3.2 G/DL (ref 3.4–5)
ALBUMIN/GLOB SERPL: 0.8 {RATIO} (ref 1–2)
ALP LIVER SERPL-CCNC: 65 U/L (ref 45–117)
ALT SERPL-CCNC: 16 U/L (ref 16–61)
ANION GAP SERPL CALC-SCNC: 7 MMOL/L (ref 0–18)
AST SERPL-CCNC: 12 U/L (ref 15–37)
BASOPHILS # BLD AUTO: 0.04 X10(3) UL (ref 0–0.2)
BASOPHILS NFR BLD AUTO: 0.9 %
BILIRUB SERPL-MCNC: 0.3 MG/DL (ref 0.1–2)
BUN BLD-MCNC: 12 MG/DL (ref 7–18)
BUN/CREAT SERPL: 14 (ref 10–20)
CALCIUM BLD-MCNC: 8.7 MG/DL (ref 8.5–10.1)
CHLORIDE SERPL-SCNC: 107 MMOL/L (ref 98–107)
CO2 SERPL-SCNC: 23 MMOL/L (ref 21–32)
CREAT BLD-MCNC: 0.86 MG/DL (ref 0.7–1.3)
DEPRECATED HBV CORE AB SER IA-ACNC: 508.8 NG/ML (ref 30–530)
DEPRECATED RDW RBC AUTO: 50.9 FL (ref 35.1–46.3)
EOSINOPHIL # BLD AUTO: 0.14 X10(3) UL (ref 0–0.7)
EOSINOPHIL NFR BLD AUTO: 3.2 %
ERYTHROCYTE [DISTWIDTH] IN BLOOD BY AUTOMATED COUNT: 16.7 % (ref 11–15)
GLOBULIN PLAS-MCNC: 3.8 G/DL (ref 2.8–4.4)
GLUCOSE BLD-MCNC: 115 MG/DL (ref 70–99)
HCT VFR BLD AUTO: 33.7 % (ref 39–53)
HGB BLD-MCNC: 10.8 G/DL (ref 13–17.5)
IMM GRANULOCYTES # BLD AUTO: 0 X10(3) UL (ref 0–1)
IMM GRANULOCYTES NFR BLD: 0 %
IRON SATURATION: 12 % (ref 20–50)
IRON SERPL-MCNC: 45 UG/DL (ref 65–175)
LYMPHOCYTES # BLD AUTO: 1.16 X10(3) UL (ref 1–4)
LYMPHOCYTES NFR BLD AUTO: 26.8 %
M PROTEIN MFR SERPL ELPH: 7 G/DL (ref 6.4–8.2)
MCH RBC QN AUTO: 27 PG (ref 26–34)
MCHC RBC AUTO-ENTMCNC: 32 G/DL (ref 31–37)
MCV RBC AUTO: 84.3 FL (ref 80–100)
MONOCYTES # BLD AUTO: 0.54 X10(3) UL (ref 0.1–1)
MONOCYTES NFR BLD AUTO: 12.5 %
NEUTROPHILS # BLD AUTO: 2.45 X10 (3) UL (ref 1.5–7.7)
NEUTROPHILS # BLD AUTO: 2.45 X10(3) UL (ref 1.5–7.7)
NEUTROPHILS NFR BLD AUTO: 56.6 %
OSMOLALITY SERPL CALC.SUM OF ELEC: 285 MOSM/KG (ref 275–295)
PLATELET # BLD AUTO: 132 10(3)UL (ref 150–450)
POTASSIUM SERPL-SCNC: 3.9 MMOL/L (ref 3.5–5.1)
RBC # BLD AUTO: 4 X10(6)UL (ref 4.3–5.7)
SODIUM SERPL-SCNC: 137 MMOL/L (ref 136–145)
TOTAL IRON BINDING CAPACITY: 370 UG/DL (ref 240–450)
TRANSFERRIN SERPL-MCNC: 248 MG/DL (ref 200–360)
WBC # BLD AUTO: 4.3 X10(3) UL (ref 4–11)

## 2019-03-15 PROCEDURE — 80053 COMPREHEN METABOLIC PANEL: CPT

## 2019-03-15 PROCEDURE — 99214 OFFICE O/P EST MOD 30 MIN: CPT | Performed by: INTERNAL MEDICINE

## 2019-03-15 PROCEDURE — 83550 IRON BINDING TEST: CPT

## 2019-03-15 PROCEDURE — 85025 COMPLETE CBC W/AUTO DIFF WBC: CPT

## 2019-03-15 PROCEDURE — 96415 CHEMO IV INFUSION ADDL HR: CPT

## 2019-03-15 PROCEDURE — 96413 CHEMO IV INFUSION 1 HR: CPT

## 2019-03-15 PROCEDURE — 96368 THER/DIAG CONCURRENT INF: CPT

## 2019-03-15 PROCEDURE — 96411 CHEMO IV PUSH ADDL DRUG: CPT

## 2019-03-15 PROCEDURE — 82728 ASSAY OF FERRITIN: CPT

## 2019-03-15 PROCEDURE — 96375 TX/PRO/DX INJ NEW DRUG ADDON: CPT

## 2019-03-15 PROCEDURE — 83540 ASSAY OF IRON: CPT

## 2019-03-15 RX ORDER — ONDANSETRON 2 MG/ML
8 INJECTION INTRAMUSCULAR; INTRAVENOUS EVERY 6 HOURS PRN
Status: CANCELLED | OUTPATIENT
Start: 2019-03-15

## 2019-03-15 RX ORDER — PROCHLORPERAZINE MALEATE 10 MG
10 TABLET ORAL EVERY 6 HOURS PRN
Status: CANCELLED | OUTPATIENT
Start: 2019-03-15

## 2019-03-15 RX ORDER — LORAZEPAM 2 MG/ML
INJECTION INTRAMUSCULAR EVERY 4 HOURS PRN
Status: CANCELLED | OUTPATIENT
Start: 2019-03-15

## 2019-03-15 RX ORDER — FLUOROURACIL 50 MG/ML
400 INJECTION, SOLUTION INTRAVENOUS ONCE
Status: COMPLETED | OUTPATIENT
Start: 2019-03-15 | End: 2019-03-15

## 2019-03-15 RX ORDER — FLUOROURACIL 50 MG/ML
2400 INJECTION, SOLUTION INTRAVENOUS CONTINUOUS
Status: DISCONTINUED | OUTPATIENT
Start: 2019-03-15 | End: 2019-03-15

## 2019-03-15 RX ORDER — FLUOROURACIL 50 MG/ML
400 INJECTION, SOLUTION INTRAVENOUS ONCE
Status: CANCELLED | OUTPATIENT
Start: 2019-03-15

## 2019-03-15 RX ORDER — SODIUM CHLORIDE 0.9 % (FLUSH) 0.9 %
10 SYRINGE (ML) INJECTION ONCE
Status: COMPLETED | OUTPATIENT
Start: 2019-03-15 | End: 2019-03-15

## 2019-03-15 RX ORDER — FLUOROURACIL 50 MG/ML
2400 INJECTION, SOLUTION INTRAVENOUS CONTINUOUS
Status: CANCELLED | OUTPATIENT
Start: 2019-03-15

## 2019-03-15 RX ORDER — SODIUM CHLORIDE 0.9 % (FLUSH) 0.9 %
10 SYRINGE (ML) INJECTION ONCE
Status: CANCELLED | OUTPATIENT
Start: 2019-03-15

## 2019-03-15 RX ORDER — LORAZEPAM 0.5 MG/1
TABLET ORAL EVERY 4 HOURS PRN
Status: CANCELLED | OUTPATIENT
Start: 2019-03-15

## 2019-03-15 RX ORDER — METOCLOPRAMIDE HYDROCHLORIDE 5 MG/ML
10 INJECTION INTRAMUSCULAR; INTRAVENOUS EVERY 6 HOURS PRN
Status: CANCELLED | OUTPATIENT
Start: 2019-03-15

## 2019-03-15 RX ADMIN — FLUOROURACIL 4800 MG: 50 INJECTION, SOLUTION INTRAVENOUS at 14:25:00

## 2019-03-15 RX ADMIN — SODIUM CHLORIDE 0.9 % (FLUSH) 10 ML: 0.9 % SYRINGE (ML) INJECTION at 09:50:00

## 2019-03-15 RX ADMIN — FLUOROURACIL 800 MG: 50 INJECTION, SOLUTION INTRAVENOUS at 14:19:00

## 2019-03-15 NOTE — PROGRESS NOTES
Pt here for C3D1.   Arrives Ambulating independently, accompanied by Spouse           Modifications in dose or schedule: No      Frequency of blood return and site check throughout administration: Prior to administration   Discharged to Home, [facility] Burris

## 2019-03-15 NOTE — PROGRESS NOTES
Patient is here for MD welch/michaela and cycle 3 of chemo. Decrease appetite for the first few days after chemo. Intermittent diarrhea and nausea but manageable with antidiarrheal and antiemetics. Patient c/o cold sensitivity in fingers and face.          Education R

## 2019-03-17 ENCOUNTER — NURSE ONLY (OUTPATIENT)
Dept: HEMATOLOGY/ONCOLOGY | Facility: HOSPITAL | Age: 54
End: 2019-03-17
Attending: INTERNAL MEDICINE
Payer: COMMERCIAL

## 2019-03-17 DIAGNOSIS — E88.81 METABOLIC SYNDROME: ICD-10-CM

## 2019-03-17 DIAGNOSIS — E78.1 HYPERTRIGLYCERIDEMIA: ICD-10-CM

## 2019-03-17 PROCEDURE — 96523 IRRIG DRUG DELIVERY DEVICE: CPT

## 2019-03-17 RX ORDER — SODIUM CHLORIDE 0.9 % (FLUSH) 0.9 %
10 SYRINGE (ML) INJECTION ONCE
Status: CANCELLED | OUTPATIENT
Start: 2019-03-29

## 2019-03-17 NOTE — PROGRESS NOTES
[facility]    PATIENT'S NAME: Nicolas Freeman   ATTENDING PHYSICIAN: Laura Petersen M.D.    PATIENT ACCOUNT #: [de-identified] LOCATION: [address] [place] [address] RECORD #: [id number] YOB: 1965   DATE OF SERVICE: 03/15/2019       CANCER C prochlorperazine 10 mg q.6 h. p.r.n. PHYSICAL EXAMINATION:    GENERAL:  He is a well-developed, well-nourished male. He is in no acute distress. VITAL SIGNS:  His performance status is 0. His weight is 190 pounds.   Blood pressure is 118/76, pulse 64 Rajinder Rodríguez M.D. Lisa North M.D.    Cori White D.O.

## 2019-03-18 RX ORDER — PRAVASTATIN SODIUM 20 MG
TABLET ORAL
Qty: 90 TABLET | Refills: 3 | Status: SHIPPED | OUTPATIENT
Start: 2019-03-18 | End: 2020-01-02

## 2019-03-28 ENCOUNTER — NURSE ONLY (OUTPATIENT)
Dept: HEMATOLOGY/ONCOLOGY | Facility: HOSPITAL | Age: 54
End: 2019-03-28
Attending: INTERNAL MEDICINE
Payer: COMMERCIAL

## 2019-03-28 PROCEDURE — 36415 COLL VENOUS BLD VENIPUNCTURE: CPT

## 2019-03-29 ENCOUNTER — OFFICE VISIT (OUTPATIENT)
Dept: HEMATOLOGY/ONCOLOGY | Facility: HOSPITAL | Age: 54
End: 2019-03-29
Attending: INTERNAL MEDICINE
Payer: COMMERCIAL

## 2019-03-29 VITALS
HEART RATE: 73 BPM | BODY MASS INDEX: 28.67 KG/M2 | HEIGHT: 68.11 IN | SYSTOLIC BLOOD PRESSURE: 145 MMHG | WEIGHT: 189.19 LBS | OXYGEN SATURATION: 98 % | RESPIRATION RATE: 20 BRPM | DIASTOLIC BLOOD PRESSURE: 84 MMHG | TEMPERATURE: 96 F

## 2019-03-29 DIAGNOSIS — C18.1 PRIMARY APPENDICEAL ADENOCARCINOMA (HCC): ICD-10-CM

## 2019-03-29 DIAGNOSIS — C18.1 PRIMARY APPENDICEAL ADENOCARCINOMA (HCC): Primary | ICD-10-CM

## 2019-03-29 DIAGNOSIS — D50.0 IRON DEFICIENCY ANEMIA DUE TO CHRONIC BLOOD LOSS: Primary | ICD-10-CM

## 2019-03-29 PROCEDURE — 96411 CHEMO IV PUSH ADDL DRUG: CPT

## 2019-03-29 PROCEDURE — 96415 CHEMO IV INFUSION ADDL HR: CPT

## 2019-03-29 PROCEDURE — 96375 TX/PRO/DX INJ NEW DRUG ADDON: CPT

## 2019-03-29 PROCEDURE — 99214 OFFICE O/P EST MOD 30 MIN: CPT | Performed by: INTERNAL MEDICINE

## 2019-03-29 PROCEDURE — 96413 CHEMO IV INFUSION 1 HR: CPT

## 2019-03-29 PROCEDURE — 96368 THER/DIAG CONCURRENT INF: CPT

## 2019-03-29 RX ORDER — FLUOROURACIL 50 MG/ML
400 INJECTION, SOLUTION INTRAVENOUS ONCE
Status: COMPLETED | OUTPATIENT
Start: 2019-03-29 | End: 2019-03-29

## 2019-03-29 RX ORDER — LORAZEPAM 2 MG/ML
INJECTION INTRAMUSCULAR EVERY 4 HOURS PRN
Status: CANCELLED | OUTPATIENT
Start: 2019-03-29

## 2019-03-29 RX ORDER — ONDANSETRON 2 MG/ML
8 INJECTION INTRAMUSCULAR; INTRAVENOUS EVERY 6 HOURS PRN
Status: CANCELLED | OUTPATIENT
Start: 2019-03-29

## 2019-03-29 RX ORDER — LORAZEPAM 0.5 MG/1
TABLET ORAL EVERY 4 HOURS PRN
Status: CANCELLED | OUTPATIENT
Start: 2019-03-29

## 2019-03-29 RX ORDER — PROCHLORPERAZINE MALEATE 10 MG
10 TABLET ORAL EVERY 6 HOURS PRN
Status: CANCELLED | OUTPATIENT
Start: 2019-03-29

## 2019-03-29 RX ORDER — HYDROCODONE BITARTRATE AND ACETAMINOPHEN 10; 325 MG/1; MG/1
1 TABLET ORAL EVERY 6 HOURS PRN
Qty: 60 TABLET | Refills: 0 | Status: SHIPPED | OUTPATIENT
Start: 2019-03-29 | End: 2019-04-12

## 2019-03-29 RX ORDER — FLUOROURACIL 50 MG/ML
2400 INJECTION, SOLUTION INTRAVENOUS CONTINUOUS
Status: CANCELLED | OUTPATIENT
Start: 2019-03-29

## 2019-03-29 RX ORDER — FLUOROURACIL 50 MG/ML
2400 INJECTION, SOLUTION INTRAVENOUS CONTINUOUS
Status: DISCONTINUED | OUTPATIENT
Start: 2019-03-29 | End: 2019-03-29

## 2019-03-29 RX ORDER — FLUOROURACIL 50 MG/ML
400 INJECTION, SOLUTION INTRAVENOUS ONCE
Status: CANCELLED | OUTPATIENT
Start: 2019-03-29

## 2019-03-29 RX ORDER — METOCLOPRAMIDE HYDROCHLORIDE 5 MG/ML
10 INJECTION INTRAMUSCULAR; INTRAVENOUS EVERY 6 HOURS PRN
Status: CANCELLED | OUTPATIENT
Start: 2019-03-29

## 2019-03-29 RX ADMIN — FLUOROURACIL 800 MG: 50 INJECTION, SOLUTION INTRAVENOUS at 12:15:00

## 2019-03-29 RX ADMIN — FLUOROURACIL 4800 MG: 50 INJECTION, SOLUTION INTRAVENOUS at 12:23:00

## 2019-03-29 NOTE — PROGRESS NOTES
Patient is here for MD f/u and cycle 4 of chemo. Patient said cold sensitivity lasted a few days longer, in total about 5 days. Intermittent nausea but manageable. Appetite varies but overall ok. Occasional fatigue.          Education Record    Learner:  Pa

## 2019-03-30 PROBLEM — D69.59 CHEMOTHERAPY-INDUCED THROMBOCYTOPENIA: Status: ACTIVE | Noted: 2019-03-30

## 2019-03-30 PROBLEM — T45.1X5A CHEMOTHERAPY-INDUCED THROMBOCYTOPENIA: Status: ACTIVE | Noted: 2019-03-30

## 2019-03-30 NOTE — PROGRESS NOTES
[facility]    PATIENT'S NAME: Bernadette Mcmanus   ATTENDING PHYSICIAN: Katie Puckett M.D.    PATIENT ACCOUNT #: [de-identified] LOCATION: [address] [place] [address] RECORD #: [id number] YOB: 1965   DATE OF SERVICE: 03/29/2019       CANCER C p.r.n. He is still taking hydrocodone, even though his wound has healed and I asked him why specifically he is doing so. He says that it has helped his generalized abdominal discomfort and he has been using 1 tablet 3 times a day.   I told him that we hong should be his final prescription. 3.   Anemia. He had some mild iron deficiency. He is eating, and his hemoglobin is improving. We talked about the potential for giving him IV iron and will recheck him again in a few weeks.   If his counts are still l

## 2019-03-31 ENCOUNTER — NURSE ONLY (OUTPATIENT)
Dept: HEMATOLOGY/ONCOLOGY | Facility: HOSPITAL | Age: 54
End: 2019-03-31
Attending: INTERNAL MEDICINE
Payer: COMMERCIAL

## 2019-03-31 PROCEDURE — 99211 OFF/OP EST MAY X REQ PHY/QHP: CPT

## 2019-04-08 ENCOUNTER — TELEPHONE (OUTPATIENT)
Dept: FAMILY MEDICINE CLINIC | Facility: CLINIC | Age: 54
End: 2019-04-08

## 2019-04-08 DIAGNOSIS — C18.1 PRIMARY APPENDICEAL ADENOCARCINOMA (HCC): ICD-10-CM

## 2019-04-08 RX ORDER — LOSARTAN POTASSIUM AND HYDROCHLOROTHIAZIDE 12.5; 5 MG/1; MG/1
1 TABLET ORAL DAILY
Qty: 90 TABLET | Refills: 2 | Status: SHIPPED | OUTPATIENT
Start: 2019-04-08 | End: 2019-10-25

## 2019-04-09 RX ORDER — HYDROCODONE BITARTRATE AND ACETAMINOPHEN 10; 325 MG/1; MG/1
1 TABLET ORAL EVERY 6 HOURS PRN
Qty: 60 TABLET | Refills: 0 | Status: CANCELLED | OUTPATIENT
Start: 2019-04-09 | End: 2019-05-09

## 2019-04-09 NOTE — TELEPHONE ENCOUNTER
Spoke to pt, states he has cancer pain, has prednisone, but requesting pain  meds  Having Chemo on Friday and will get vicodin from cancer doc, however asking if Jennifer Smith will refill norco for now? Please advise if you will refill?

## 2019-04-10 ENCOUNTER — TELEPHONE (OUTPATIENT)
Dept: FAMILY MEDICINE CLINIC | Facility: CLINIC | Age: 54
End: 2019-04-10

## 2019-04-10 RX ORDER — INDOMETHACIN 50 MG/1
50 CAPSULE ORAL
Qty: 15 CAPSULE | Refills: 0 | Status: SHIPPED | OUTPATIENT
Start: 2019-04-10 | End: 2019-07-09

## 2019-04-10 RX ORDER — PREDNISONE 20 MG/1
20 TABLET ORAL DAILY
Qty: 14 TABLET | Refills: 0 | Status: SHIPPED | OUTPATIENT
Start: 2019-04-10 | End: 2019-04-24

## 2019-04-10 RX ORDER — COLCHICINE 0.6 MG/1
0.6 TABLET ORAL 2 TIMES DAILY
Qty: 30 TABLET | Refills: 0 | Status: SHIPPED | OUTPATIENT
Start: 2019-04-10 | End: 2019-04-10

## 2019-04-10 NOTE — TELEPHONE ENCOUNTER
Spoke to Laura Knight - see will call Dr. Mahesh Steinberg to make sure patient can take - there has been no mention of avoiding NSAIDS. rx sent to pharmacy.

## 2019-04-10 NOTE — TELEPHONE ENCOUNTER
I do not know what type of cancer he has or what treatments he is on currently.   If his oncologist has not told him to avoid NSAIDs, could try indomethacin 50 mg 3 times a day as needed for gout #15

## 2019-04-10 NOTE — TELEPHONE ENCOUNTER
colchicine 0.6 MG Oral Tab       Not covered by insurance. Is there an equivalent? Pt is in a lot of pain. He can't even put his shoe on. He is in the middle of chemo treatments. His next chemo is on Friday. It took 3 days for DOV to call him.   Would

## 2019-04-10 NOTE — PROGRESS NOTES
Somewhat complicated visit. First took time to review his recent lab tests. In spite of his having lost 20 pounds his blood sugar fasting is still 108. His ALT is 81. His triglycerides 298. His HDL 28. A1c 5.6.   As a result of this I am increasing hi feeling better/patient states "ready to go home"

## 2019-04-10 NOTE — TELEPHONE ENCOUNTER
Patients wife called upset. Patients gout has returned and patient is in a lot of pain. They specifically want Dr Carline Clark to call them ASAP.

## 2019-04-10 NOTE — TELEPHONE ENCOUNTER
Dr. Kimberly Buck,  Can you please look at this / Nguyễn hayes for the day. Was prescribed Prednisone and Colchicine for gout today. Colchicine will cost $190 out of pocket without P. A. Patient needs today.  He is a cancer patient - Oncologist gave him Glenn Le #60 3/29/

## 2019-04-11 ENCOUNTER — NURSE ONLY (OUTPATIENT)
Dept: HEMATOLOGY/ONCOLOGY | Facility: HOSPITAL | Age: 54
End: 2019-04-11
Attending: INTERNAL MEDICINE
Payer: COMMERCIAL

## 2019-04-11 DIAGNOSIS — D50.0 IRON DEFICIENCY ANEMIA DUE TO CHRONIC BLOOD LOSS: ICD-10-CM

## 2019-04-11 PROCEDURE — 83550 IRON BINDING TEST: CPT

## 2019-04-11 PROCEDURE — 82728 ASSAY OF FERRITIN: CPT

## 2019-04-11 PROCEDURE — 36415 COLL VENOUS BLD VENIPUNCTURE: CPT

## 2019-04-11 PROCEDURE — 83540 ASSAY OF IRON: CPT

## 2019-04-11 RX ORDER — INDOMETHACIN 50 MG/1
CAPSULE ORAL
Qty: 270 CAPSULE | Refills: 0 | OUTPATIENT
Start: 2019-04-11

## 2019-04-12 ENCOUNTER — OFFICE VISIT (OUTPATIENT)
Dept: HEMATOLOGY/ONCOLOGY | Facility: HOSPITAL | Age: 54
End: 2019-04-12
Attending: INTERNAL MEDICINE
Payer: COMMERCIAL

## 2019-04-12 VITALS
TEMPERATURE: 97 F | WEIGHT: 184 LBS | OXYGEN SATURATION: 99 % | RESPIRATION RATE: 20 BRPM | DIASTOLIC BLOOD PRESSURE: 89 MMHG | BODY MASS INDEX: 27.89 KG/M2 | HEIGHT: 68.11 IN | HEART RATE: 75 BPM | SYSTOLIC BLOOD PRESSURE: 152 MMHG

## 2019-04-12 DIAGNOSIS — C18.1 PRIMARY APPENDICEAL ADENOCARCINOMA (HCC): Primary | ICD-10-CM

## 2019-04-12 DIAGNOSIS — M1A.1790 CHRONIC LEAD-INDUCED GOUT INVOLVING TOE WITHOUT TOPHUS, UNSPECIFIED LATERALITY, SUBSEQUENT ENCOUNTER: ICD-10-CM

## 2019-04-12 DIAGNOSIS — T56.0X1D CHRONIC LEAD-INDUCED GOUT INVOLVING TOE WITHOUT TOPHUS, UNSPECIFIED LATERALITY, SUBSEQUENT ENCOUNTER: ICD-10-CM

## 2019-04-12 DIAGNOSIS — D50.0 IRON DEFICIENCY ANEMIA DUE TO CHRONIC BLOOD LOSS: ICD-10-CM

## 2019-04-12 PROCEDURE — 96368 THER/DIAG CONCURRENT INF: CPT

## 2019-04-12 PROCEDURE — 85025 COMPLETE CBC W/AUTO DIFF WBC: CPT

## 2019-04-12 PROCEDURE — 96413 CHEMO IV INFUSION 1 HR: CPT

## 2019-04-12 PROCEDURE — 99214 OFFICE O/P EST MOD 30 MIN: CPT | Performed by: INTERNAL MEDICINE

## 2019-04-12 PROCEDURE — 96411 CHEMO IV PUSH ADDL DRUG: CPT

## 2019-04-12 PROCEDURE — 80053 COMPREHEN METABOLIC PANEL: CPT

## 2019-04-12 PROCEDURE — 96415 CHEMO IV INFUSION ADDL HR: CPT

## 2019-04-12 PROCEDURE — 96375 TX/PRO/DX INJ NEW DRUG ADDON: CPT

## 2019-04-12 RX ORDER — PROCHLORPERAZINE MALEATE 10 MG
10 TABLET ORAL EVERY 6 HOURS PRN
Status: CANCELLED | OUTPATIENT
Start: 2019-04-12

## 2019-04-12 RX ORDER — METOCLOPRAMIDE HYDROCHLORIDE 5 MG/ML
10 INJECTION INTRAMUSCULAR; INTRAVENOUS EVERY 6 HOURS PRN
Status: CANCELLED | OUTPATIENT
Start: 2019-04-12

## 2019-04-12 RX ORDER — FLUOROURACIL 50 MG/ML
2400 INJECTION, SOLUTION INTRAVENOUS CONTINUOUS
Status: DISCONTINUED | OUTPATIENT
Start: 2019-04-12 | End: 2019-04-12

## 2019-04-12 RX ORDER — LORAZEPAM 0.5 MG/1
TABLET ORAL EVERY 4 HOURS PRN
Status: CANCELLED | OUTPATIENT
Start: 2019-04-12

## 2019-04-12 RX ORDER — LORAZEPAM 2 MG/ML
INJECTION INTRAMUSCULAR EVERY 4 HOURS PRN
Status: CANCELLED | OUTPATIENT
Start: 2019-04-12

## 2019-04-12 RX ORDER — FLUOROURACIL 50 MG/ML
400 INJECTION, SOLUTION INTRAVENOUS ONCE
Status: COMPLETED | OUTPATIENT
Start: 2019-04-12 | End: 2019-04-12

## 2019-04-12 RX ORDER — FLUOROURACIL 50 MG/ML
2400 INJECTION, SOLUTION INTRAVENOUS CONTINUOUS
Status: CANCELLED | OUTPATIENT
Start: 2019-04-12

## 2019-04-12 RX ORDER — FLUOROURACIL 50 MG/ML
400 INJECTION, SOLUTION INTRAVENOUS ONCE
Status: CANCELLED | OUTPATIENT
Start: 2019-04-12

## 2019-04-12 RX ORDER — ONDANSETRON 2 MG/ML
8 INJECTION INTRAMUSCULAR; INTRAVENOUS EVERY 6 HOURS PRN
Status: CANCELLED | OUTPATIENT
Start: 2019-04-12

## 2019-04-12 RX ADMIN — FLUOROURACIL 4800 MG: 50 INJECTION, SOLUTION INTRAVENOUS at 12:04:00

## 2019-04-12 RX ADMIN — FLUOROURACIL 800 MG: 50 INJECTION, SOLUTION INTRAVENOUS at 11:56:00

## 2019-04-12 NOTE — PROGRESS NOTES
Pt here for C5D1.   Arrives Ambulating independently, accompanied by Spouse           Modifications in dose or schedule: No     Frequency of blood return and site check throughout administration: Prior to administration and Every 2-3 ml IVP   Discharged to

## 2019-04-12 NOTE — PROGRESS NOTES
[facility]    PATIENT'S NAME: Sabine Gonzales   ATTENDING PHYSICIAN: Tre Molina M.D.    PATIENT ACCOUNT #: [de-identified] LOCATION: [address] [place] [address] RECORD #: [id number] YOB: 1965   DATE OF SERVICE: 04/12/2019       CANCER C potassium/hydrochlorothiazide 50/12.5 daily, ondansetron 8 mg q.8 h. p.r.n., pravastatin 20 mg at bedtime, and prochlorperazine 10 mg q.6 h. p.r.n. He had a flare-up of gout in his left foot.   He is on a short course of prednisone from his primary care ph this.    Dictated By Peyton Mena M.D.  d: 04/12/2019 09:44:35  t: 04/12/2019 11:00:32  [facility] [id number]  /    cc: SIRENA Sanchez M.D. Hodges Hidalgo, D.O.

## 2019-04-14 ENCOUNTER — NURSE ONLY (OUTPATIENT)
Dept: HEMATOLOGY/ONCOLOGY | Facility: HOSPITAL | Age: 54
End: 2019-04-14
Attending: INTERNAL MEDICINE
Payer: COMMERCIAL

## 2019-04-14 PROCEDURE — 96523 IRRIG DRUG DELIVERY DEVICE: CPT

## 2019-04-14 NOTE — PROGRESS NOTES
Pump D/C completed per protocol.  Pt states he was feeling well today and denies any unmanageable SE.     Education Record    Learner:  Patient, spouse, family members    Disease / Diagnosis: appendiceal     Barriers / Limitations:  None   Comments:    Meth

## 2019-04-26 ENCOUNTER — OFFICE VISIT (OUTPATIENT)
Dept: HEMATOLOGY/ONCOLOGY | Facility: HOSPITAL | Age: 54
End: 2019-04-26
Attending: INTERNAL MEDICINE
Payer: COMMERCIAL

## 2019-04-26 VITALS
OXYGEN SATURATION: 97 % | BODY MASS INDEX: 27.31 KG/M2 | HEART RATE: 84 BPM | SYSTOLIC BLOOD PRESSURE: 147 MMHG | RESPIRATION RATE: 16 BRPM | WEIGHT: 180.19 LBS | TEMPERATURE: 98 F | HEIGHT: 68.11 IN | DIASTOLIC BLOOD PRESSURE: 94 MMHG

## 2019-04-26 DIAGNOSIS — C18.1 PRIMARY APPENDICEAL ADENOCARCINOMA (HCC): ICD-10-CM

## 2019-04-26 DIAGNOSIS — D69.59 CHEMOTHERAPY-INDUCED THROMBOCYTOPENIA: ICD-10-CM

## 2019-04-26 DIAGNOSIS — C18.1 PRIMARY APPENDICEAL ADENOCARCINOMA (HCC): Primary | ICD-10-CM

## 2019-04-26 DIAGNOSIS — Z85.038 ENCOUNTER FOR FOLLOW-UP SURVEILLANCE OF APPENDICEAL CANCER: Primary | ICD-10-CM

## 2019-04-26 DIAGNOSIS — T45.1X5A CHEMOTHERAPY-INDUCED THROMBOCYTOPENIA: ICD-10-CM

## 2019-04-26 DIAGNOSIS — D50.0 IRON DEFICIENCY ANEMIA DUE TO CHRONIC BLOOD LOSS: ICD-10-CM

## 2019-04-26 DIAGNOSIS — Z08 ENCOUNTER FOR FOLLOW-UP SURVEILLANCE OF APPENDICEAL CANCER: Primary | ICD-10-CM

## 2019-04-26 PROCEDURE — 99214 OFFICE O/P EST MOD 30 MIN: CPT | Performed by: INTERNAL MEDICINE

## 2019-04-26 PROCEDURE — 96413 CHEMO IV INFUSION 1 HR: CPT

## 2019-04-26 PROCEDURE — 85025 COMPLETE CBC W/AUTO DIFF WBC: CPT

## 2019-04-26 PROCEDURE — 96375 TX/PRO/DX INJ NEW DRUG ADDON: CPT

## 2019-04-26 PROCEDURE — 96368 THER/DIAG CONCURRENT INF: CPT

## 2019-04-26 PROCEDURE — 96411 CHEMO IV PUSH ADDL DRUG: CPT

## 2019-04-26 PROCEDURE — 80053 COMPREHEN METABOLIC PANEL: CPT

## 2019-04-26 PROCEDURE — 96417 CHEMO IV INFUS EACH ADDL SEQ: CPT

## 2019-04-26 PROCEDURE — 96415 CHEMO IV INFUSION ADDL HR: CPT

## 2019-04-26 RX ORDER — LORAZEPAM 2 MG/ML
INJECTION INTRAMUSCULAR EVERY 4 HOURS PRN
Status: CANCELLED | OUTPATIENT
Start: 2019-04-26

## 2019-04-26 RX ORDER — FLUOROURACIL 50 MG/ML
2400 INJECTION, SOLUTION INTRAVENOUS CONTINUOUS
Status: DISCONTINUED | OUTPATIENT
Start: 2019-04-26 | End: 2019-04-26

## 2019-04-26 RX ORDER — FLUOROURACIL 50 MG/ML
400 INJECTION, SOLUTION INTRAVENOUS ONCE
Status: CANCELLED | OUTPATIENT
Start: 2019-04-26

## 2019-04-26 RX ORDER — ONDANSETRON 2 MG/ML
8 INJECTION INTRAMUSCULAR; INTRAVENOUS EVERY 6 HOURS PRN
Status: CANCELLED | OUTPATIENT
Start: 2019-04-26

## 2019-04-26 RX ORDER — LORAZEPAM 0.5 MG/1
TABLET ORAL EVERY 4 HOURS PRN
Status: CANCELLED | OUTPATIENT
Start: 2019-04-26

## 2019-04-26 RX ORDER — FLUOROURACIL 50 MG/ML
2400 INJECTION, SOLUTION INTRAVENOUS CONTINUOUS
Status: CANCELLED | OUTPATIENT
Start: 2019-04-26

## 2019-04-26 RX ORDER — METOCLOPRAMIDE HYDROCHLORIDE 5 MG/ML
10 INJECTION INTRAMUSCULAR; INTRAVENOUS EVERY 6 HOURS PRN
Status: CANCELLED | OUTPATIENT
Start: 2019-04-26

## 2019-04-26 RX ORDER — FLUOROURACIL 50 MG/ML
400 INJECTION, SOLUTION INTRAVENOUS ONCE
Status: COMPLETED | OUTPATIENT
Start: 2019-04-26 | End: 2019-04-26

## 2019-04-26 RX ORDER — PROCHLORPERAZINE MALEATE 10 MG
10 TABLET ORAL EVERY 6 HOURS PRN
Status: CANCELLED | OUTPATIENT
Start: 2019-04-26

## 2019-04-26 RX ADMIN — FLUOROURACIL 4800 MG: 50 INJECTION, SOLUTION INTRAVENOUS at 14:33:00

## 2019-04-26 RX ADMIN — FLUOROURACIL 800 MG: 50 INJECTION, SOLUTION INTRAVENOUS at 14:28:00

## 2019-04-26 NOTE — PROGRESS NOTES
Pt. And wife wondering about survivorship appt. I encouraged them to make appt. And explained that it is a resource for them and offers good information, tips, etc, post chemo.   They will see how they feel over the next few weeks and will make appt if the

## 2019-04-26 NOTE — PROGRESS NOTES
Education Record    Learner:  Patient    Disease / Diagnosis:  Appendiceal cancer    Barriers / Limitations:  None   Comments:    Method:  Brief focused   Comments:    General Topics:  Medication, Procedure, Side effects and symptom management and Plan of

## 2019-04-26 NOTE — PROGRESS NOTES
Patient is here for MD f/u and cycle 6 of chemo. Side effects lasted nearly 2 weeks. He just started feeling better yesterday. Patient had a general feeling of not feeling well. Decrease appetite and chronic fatigue. Mild tingling in fingertips.  Patient wa

## 2019-04-28 ENCOUNTER — NURSE ONLY (OUTPATIENT)
Dept: HEMATOLOGY/ONCOLOGY | Facility: HOSPITAL | Age: 54
End: 2019-04-28
Attending: INTERNAL MEDICINE
Payer: COMMERCIAL

## 2019-04-28 DIAGNOSIS — D50.0 IRON DEFICIENCY ANEMIA DUE TO CHRONIC BLOOD LOSS: Primary | ICD-10-CM

## 2019-04-28 DIAGNOSIS — C18.1 PRIMARY APPENDICEAL ADENOCARCINOMA (HCC): ICD-10-CM

## 2019-04-28 PROCEDURE — 96523 IRRIG DRUG DELIVERY DEVICE: CPT

## 2019-04-28 RX ORDER — SODIUM CHLORIDE 0.9 % (FLUSH) 0.9 %
10 SYRINGE (ML) INJECTION ONCE
Status: COMPLETED | OUTPATIENT
Start: 2019-04-28 | End: 2019-04-28

## 2019-04-28 RX ORDER — SODIUM CHLORIDE 0.9 % (FLUSH) 0.9 %
10 SYRINGE (ML) INJECTION ONCE
Status: CANCELLED | OUTPATIENT
Start: 2019-04-28

## 2019-04-28 RX ADMIN — SODIUM CHLORIDE 0.9 % (FLUSH) 10 ML: 0.9 % SYRINGE (ML) INJECTION at 10:16:00

## 2019-04-28 NOTE — PROGRESS NOTES
Education Record    Learner:  Patient and Spouse    Disease / Diagnosis:appendix ca  Barriers / Limitations:  None   Comments:    Method:  Discussion and Reinforcement   Comments:    General Topics:  Infection, Medication, Precautions, Procedure, Side effe

## 2019-04-30 NOTE — PROGRESS NOTES
[facility]    PATIENT'S NAME: [facility]   ATTENDING PHYSICIAN: Jennifer Hernandez M.D.    PATIENT ACCOUNT #: [de-identified] LOCATION: [address] [place] [address] RECORD #: [id number] YOB: 1965   DATE OF SERVICE: 04/26/2019       CANCER C which is down 4 pounds. Blood pressure is 147/94, pulse 84, respiratory rate is 20, temperature is 98. 3. HEENT:  Unremarkable. He has pink conjunctiva, anicteric sclerae. Pharynx is without lesions.     LYMPHATICS:  He has no cervical, supraclavicular, 15:29:21  t: 04/30/2019 05:21:26  [facility] [id number]  /    cc: SIRENA Ramesh M.D. Quirino Diener, D.O. Eleni Creamer, APN

## 2019-05-01 ENCOUNTER — OFFICE VISIT (OUTPATIENT)
Dept: HEMATOLOGY/ONCOLOGY | Facility: HOSPITAL | Age: 54
End: 2019-05-01
Attending: INTERNAL MEDICINE
Payer: COMMERCIAL

## 2019-05-01 ENCOUNTER — TELEPHONE (OUTPATIENT)
Dept: HEMATOLOGY/ONCOLOGY | Facility: HOSPITAL | Age: 54
End: 2019-05-01

## 2019-05-01 VITALS
WEIGHT: 179 LBS | SYSTOLIC BLOOD PRESSURE: 154 MMHG | HEART RATE: 78 BPM | TEMPERATURE: 97 F | BODY MASS INDEX: 27.13 KG/M2 | DIASTOLIC BLOOD PRESSURE: 92 MMHG | RESPIRATION RATE: 18 BRPM | HEIGHT: 68.11 IN | OXYGEN SATURATION: 96 %

## 2019-05-01 DIAGNOSIS — H61.23 BILATERAL IMPACTED CERUMEN: ICD-10-CM

## 2019-05-01 DIAGNOSIS — C18.1 PRIMARY APPENDICEAL ADENOCARCINOMA (HCC): ICD-10-CM

## 2019-05-01 DIAGNOSIS — J30.2 SEASONAL ALLERGIES: ICD-10-CM

## 2019-05-01 DIAGNOSIS — Z45.2 ENCOUNTER FOR CARE RELATED TO PORT-A-CATH: Primary | ICD-10-CM

## 2019-05-01 PROCEDURE — 99214 OFFICE O/P EST MOD 30 MIN: CPT | Performed by: CLINICAL NURSE SPECIALIST

## 2019-05-01 NOTE — TELEPHONE ENCOUNTER
Spoke to patient's sig other. Port deaccessed on Sunday. Was bruised when needle came out - now more swollen and extends along the line. I told them it needs to be seem - could have ecchymosis, infection or thrombus.   Will have him seen right away this

## 2019-05-01 NOTE — PROGRESS NOTES
ANP Visit Note    Patient Name: Louisa Parish   YOB: 1965   Medical Record Number: [id number]   CSN: [id number]   Date of visit: 5/1/2019       Chief Complaint/Reason for Visit:  Appendiceal carcinoma, PAC tenderness, Ear fullness    Onco vertigo)    • Cancer ([facility] 11/2018    Appendix cancer    • Disorder of thyroid    • Essential hypertension    • Extrinsic asthma, unspecified    • Gout    • Other and unspecified hyperlipidemia    • Visual impairment     glasses       Surgical History:  Pa children: Not on file      Years of education: Not on file      Highest education level: Not on file    Occupational History      Not on file    Social Needs      Financial resource strain: Not on file      Food insecurity:        Worry: Not on file Self-Exams: Not Asked    Social History Narrative      Not on file      Medications:    Current Outpatient Medications:   •  Losartan Potassium-HCTZ 50-12.5 MG Oral Tab, Take 1 tablet by mouth daily. , Disp: 90 tablet, Rfl: 2  •  PRAVASTATIN SODIUM 20 MG Or lymphadenopathy. Neck is supple. Chest: Clear to auscultation. PAC site without erythema, mild bruising at 12:00 above PAC. Heart: Regular rate and rhythm. Extremities: No edema. Neurological: Grossly intact.    Psych/Depression: mood and affect are a

## 2019-06-02 DIAGNOSIS — E88.81 METABOLIC SYNDROME: ICD-10-CM

## 2019-06-02 DIAGNOSIS — E78.1 HYPERTRIGLYCERIDEMIA: ICD-10-CM

## 2019-06-03 RX ORDER — HYDROCHLOROTHIAZIDE 12.5 MG/1
12.5 TABLET ORAL DAILY
Qty: 90 TABLET | Refills: 3 | Status: SHIPPED | OUTPATIENT
Start: 2019-06-03 | End: 2020-07-13

## 2019-06-03 RX ORDER — FENOFIBRATE 160 MG/1
TABLET ORAL
Qty: 90 TABLET | Refills: 0 | Status: SHIPPED | OUTPATIENT
Start: 2019-06-03 | End: 2019-07-02

## 2019-06-03 RX ORDER — LOSARTAN POTASSIUM AND HYDROCHLOROTHIAZIDE 12.5; 5 MG/1; MG/1
1 TABLET ORAL DAILY
Qty: 90 TABLET | Refills: 0 | Status: SHIPPED | OUTPATIENT
Start: 2019-06-03 | End: 2019-06-03 | Stop reason: RX

## 2019-06-03 RX ORDER — LOSARTAN POTASSIUM 50 MG/1
50 TABLET ORAL DAILY
Qty: 90 TABLET | Refills: 3 | Status: SHIPPED | OUTPATIENT
Start: 2019-06-03 | End: 2020-07-09

## 2019-06-07 ENCOUNTER — TELEPHONE (OUTPATIENT)
Dept: HEMATOLOGY/ONCOLOGY | Facility: HOSPITAL | Age: 54
End: 2019-06-07

## 2019-06-07 NOTE — TELEPHONE ENCOUNTER
Spoke with pt's spouse. Reports that pt has been experiencing diarrhea approx 3-4 times per week in the evenings. Wondering if this is normal or what pt should do. Denies any other complaint. He is eating/drinking well. Energy level is good. Denies pain.

## 2019-06-14 ENCOUNTER — HOSPITAL ENCOUNTER (OUTPATIENT)
Dept: CT IMAGING | Facility: HOSPITAL | Age: 54
Discharge: HOME | End: 2019-06-14
Attending: INTERNAL MEDICINE
Payer: COMMERCIAL

## 2019-06-14 ENCOUNTER — TELEPHONE (OUTPATIENT)
Dept: HEMATOLOGY/ONCOLOGY | Facility: HOSPITAL | Age: 54
End: 2019-06-14

## 2019-06-14 DIAGNOSIS — Z85.038 ENCOUNTER FOR FOLLOW-UP SURVEILLANCE OF APPENDICEAL CANCER: ICD-10-CM

## 2019-06-14 DIAGNOSIS — Z08 ENCOUNTER FOR FOLLOW-UP SURVEILLANCE OF APPENDICEAL CANCER: ICD-10-CM

## 2019-06-14 PROCEDURE — 71260 CT THORAX DX C+: CPT | Performed by: INTERNAL MEDICINE

## 2019-06-14 PROCEDURE — 82565 ASSAY OF CREATININE: CPT

## 2019-06-14 PROCEDURE — 74177 CT ABD & PELVIS W/CONTRAST: CPT | Performed by: INTERNAL MEDICINE

## 2019-06-14 NOTE — TELEPHONE ENCOUNTER
Pt's spouse returning MD call. Has additional questions and would like to speak with MD if possible.

## 2019-06-14 NOTE — TELEPHONE ENCOUNTER
Left message on sig other's voic email regarding scan. I believe findings are all related to scarring from prior surgery and wound issues. Will review imaging in GI onc conference on Weds and will call them back.   May need short term repeat imaging to ma

## 2019-06-19 ENCOUNTER — TELEPHONE (OUTPATIENT)
Dept: HEMATOLOGY/ONCOLOGY | Facility: HOSPITAL | Age: 54
End: 2019-06-19

## 2019-06-19 DIAGNOSIS — C18.1 CANCER OF APPENDIX (HCC): Primary | ICD-10-CM

## 2019-06-19 NOTE — TELEPHONE ENCOUNTER
Images from CT were reviewed at [address] [place] [address]. The left upper quadrant stranding is not particularly suspicious - may be post-inflammatory given his clinical course after HIPEC.   Recommended 3 month follow up scan - spoke to his sig other and order ha

## 2019-07-02 DIAGNOSIS — E03.9 HYPOTHYROIDISM, UNSPECIFIED TYPE: ICD-10-CM

## 2019-07-02 DIAGNOSIS — E78.1 HYPERTRIGLYCERIDEMIA: ICD-10-CM

## 2019-07-02 DIAGNOSIS — E88.81 METABOLIC SYNDROME: ICD-10-CM

## 2019-07-02 RX ORDER — FENOFIBRATE 160 MG/1
TABLET ORAL
Qty: 90 TABLET | Refills: 0 | Status: SHIPPED | OUTPATIENT
Start: 2019-07-02 | End: 2019-10-11

## 2019-07-02 RX ORDER — LEVOTHYROXINE SODIUM 0.1 MG/1
TABLET ORAL
Qty: 90 TABLET | Refills: 0 | Status: SHIPPED | OUTPATIENT
Start: 2019-07-02 | End: 2019-10-11

## 2019-07-08 DIAGNOSIS — M10.9 ACUTE GOUT, UNSPECIFIED CAUSE, UNSPECIFIED SITE: Primary | ICD-10-CM

## 2019-07-08 RX ORDER — PREDNISONE 20 MG/1
20 TABLET ORAL DAILY
Qty: 14 TABLET | Refills: 0 | Status: CANCELLED | OUTPATIENT
Start: 2019-07-08 | End: 2019-07-22

## 2019-07-08 NOTE — TELEPHONE ENCOUNTER
Patients wife is calling. He is having a gout episode and only has 1 pill left. Wife is requesting a nurse to call. Patient does have appt scheduled on Wed with Jes Fields but may cancel as patient states Olympia Neida knows his history and really wants to see him.

## 2019-07-08 NOTE — TELEPHONE ENCOUNTER
Spoke to wife, on consent,  Pt gets gout on and off, pt has appt wed with YP- however pt only wants to see Kezia Carrasquillo due to his history. appt cancelled, if rx approved, call wife back regarding refill and to make appt with JG.      He is asking if he can refill on

## 2019-07-09 DIAGNOSIS — M10.9 ACUTE GOUT, UNSPECIFIED CAUSE, UNSPECIFIED SITE: ICD-10-CM

## 2019-07-09 RX ORDER — PREDNISONE 20 MG/1
TABLET ORAL
Qty: 13 TABLET | Refills: 0 | Status: SHIPPED | OUTPATIENT
Start: 2019-07-09 | End: 2019-08-17 | Stop reason: ALTCHOICE

## 2019-07-09 RX ORDER — INDOMETHACIN 50 MG/1
50 CAPSULE ORAL
Qty: 15 CAPSULE | Refills: 0 | Status: SHIPPED | OUTPATIENT
Start: 2019-07-09 | End: 2019-07-14

## 2019-07-12 RX ORDER — INDOMETHACIN 50 MG/1
CAPSULE ORAL
Qty: 270 CAPSULE | Refills: 0 | OUTPATIENT
Start: 2019-07-12

## 2019-07-26 ENCOUNTER — NURSE ONLY (OUTPATIENT)
Dept: HEMATOLOGY/ONCOLOGY | Facility: HOSPITAL | Age: 54
End: 2019-07-26
Attending: INTERNAL MEDICINE
Payer: COMMERCIAL

## 2019-07-26 DIAGNOSIS — C18.1: Primary | ICD-10-CM

## 2019-07-26 LAB
ALBUMIN SERPL-MCNC: 4 G/DL (ref 3.4–5)
ALBUMIN/GLOB SERPL: 1 {RATIO} (ref 1–2)
ALP LIVER SERPL-CCNC: 58 U/L (ref 45–117)
ALT SERPL-CCNC: 21 U/L (ref 16–61)
ANION GAP SERPL CALC-SCNC: 9 MMOL/L (ref 0–18)
AST SERPL-CCNC: 16 U/L (ref 15–37)
BASOPHILS # BLD AUTO: 0.05 X10(3) UL (ref 0–0.2)
BASOPHILS NFR BLD AUTO: 0.5 %
BILIRUB SERPL-MCNC: 0.3 MG/DL (ref 0.1–2)
BUN BLD-MCNC: 16 MG/DL (ref 7–18)
BUN/CREAT SERPL: 13.7 (ref 10–20)
CALCIUM BLD-MCNC: 9.3 MG/DL (ref 8.5–10.1)
CHLORIDE SERPL-SCNC: 104 MMOL/L (ref 98–112)
CO2 SERPL-SCNC: 26 MMOL/L (ref 21–32)
CREAT BLD-MCNC: 1.17 MG/DL (ref 0.7–1.3)
DEPRECATED RDW RBC AUTO: 46.5 FL (ref 35.1–46.3)
EOSINOPHIL # BLD AUTO: 0.2 X10(3) UL (ref 0–0.7)
EOSINOPHIL NFR BLD AUTO: 2 %
ERYTHROCYTE [DISTWIDTH] IN BLOOD BY AUTOMATED COUNT: 14.5 % (ref 11–15)
GLOBULIN PLAS-MCNC: 4.2 G/DL (ref 2.8–4.4)
GLUCOSE BLD-MCNC: 83 MG/DL (ref 70–99)
HCT VFR BLD AUTO: 39.9 % (ref 39–53)
HGB BLD-MCNC: 13.2 G/DL (ref 13–17.5)
IMM GRANULOCYTES # BLD AUTO: 0.04 X10(3) UL (ref 0–1)
IMM GRANULOCYTES NFR BLD: 0.4 %
LYMPHOCYTES # BLD AUTO: 2.79 X10(3) UL (ref 1–4)
LYMPHOCYTES NFR BLD AUTO: 28.6 %
M PROTEIN MFR SERPL ELPH: 8.2 G/DL (ref 6.4–8.2)
MCH RBC QN AUTO: 29.3 PG (ref 26–34)
MCHC RBC AUTO-ENTMCNC: 33.1 G/DL (ref 31–37)
MCV RBC AUTO: 88.7 FL (ref 80–100)
MONOCYTES # BLD AUTO: 0.81 X10(3) UL (ref 0.1–1)
MONOCYTES NFR BLD AUTO: 8.3 %
NEUTROPHILS # BLD AUTO: 5.87 X10 (3) UL (ref 1.5–7.7)
NEUTROPHILS # BLD AUTO: 5.87 X10(3) UL (ref 1.5–7.7)
NEUTROPHILS NFR BLD AUTO: 60.2 %
OSMOLALITY SERPL CALC.SUM OF ELEC: 288 MOSM/KG (ref 275–295)
PLATELET # BLD AUTO: 242 10(3)UL (ref 150–450)
POTASSIUM SERPL-SCNC: 3.4 MMOL/L (ref 3.5–5.1)
RBC # BLD AUTO: 4.5 X10(6)UL (ref 4.3–5.7)
SODIUM SERPL-SCNC: 139 MMOL/L (ref 136–145)
WBC # BLD AUTO: 9.8 X10(3) UL (ref 4–11)

## 2019-07-26 PROCEDURE — 80053 COMPREHEN METABOLIC PANEL: CPT | Performed by: INTERNAL MEDICINE

## 2019-07-26 PROCEDURE — 85025 COMPLETE CBC W/AUTO DIFF WBC: CPT | Performed by: INTERNAL MEDICINE

## 2019-07-26 PROCEDURE — 36415 COLL VENOUS BLD VENIPUNCTURE: CPT

## 2019-08-17 ENCOUNTER — OFFICE VISIT (OUTPATIENT)
Dept: FAMILY MEDICINE CLINIC | Facility: CLINIC | Age: 54
End: 2019-08-17
Payer: COMMERCIAL

## 2019-08-17 VITALS
WEIGHT: 202 LBS | HEIGHT: 68 IN | TEMPERATURE: 98 F | RESPIRATION RATE: 16 BRPM | SYSTOLIC BLOOD PRESSURE: 124 MMHG | DIASTOLIC BLOOD PRESSURE: 74 MMHG | HEART RATE: 79 BPM | BODY MASS INDEX: 30.62 KG/M2 | OXYGEN SATURATION: 95 %

## 2019-08-17 DIAGNOSIS — C18.9 MALIGNANT NEOPLASM OF COLON, UNSPECIFIED PART OF COLON (HCC): Primary | ICD-10-CM

## 2019-08-17 DIAGNOSIS — Z23 NEED FOR PNEUMOCOCCAL VACCINATION: ICD-10-CM

## 2019-08-17 PROCEDURE — 90471 IMMUNIZATION ADMIN: CPT | Performed by: FAMILY MEDICINE

## 2019-08-17 PROCEDURE — 90670 PCV13 VACCINE IM: CPT | Performed by: FAMILY MEDICINE

## 2019-08-17 PROCEDURE — 99214 OFFICE O/P EST MOD 30 MIN: CPT | Performed by: FAMILY MEDICINE

## 2019-08-17 RX ORDER — PREDNISONE 20 MG/1
20 TABLET ORAL DAILY
Qty: 14 TABLET | Refills: 0 | Status: SHIPPED | OUTPATIENT
Start: 2019-08-17 | End: 2020-11-05 | Stop reason: ALTCHOICE

## 2019-08-17 RX ORDER — NEOMYCIN SULFATE, POLYMYXIN B SULFATE, HYDROCORTISONE 3.5; 10000; 1 MG/ML; [USP'U]/ML; MG/ML
SOLUTION/ DROPS AURICULAR (OTIC)
Qty: 10 ML | Refills: 0 | Status: SHIPPED | OUTPATIENT
Start: 2019-08-17

## 2019-08-17 RX ORDER — KETOCONAZOLE 20 MG/G
CREAM TOPICAL
Qty: 60 G | Refills: 0 | Status: SHIPPED | OUTPATIENT
Start: 2019-08-17 | End: 2019-12-06

## 2019-08-17 RX ORDER — INDOMETHACIN 50 MG/1
CAPSULE ORAL
Qty: 120 CAPSULE | Refills: 0 | Status: SHIPPED | OUTPATIENT
Start: 2019-08-17 | End: 2019-09-19

## 2019-08-17 RX ORDER — HYDROCODONE BITARTRATE AND ACETAMINOPHEN 10; 325 MG/1; MG/1
1 TABLET ORAL EVERY 4 HOURS PRN
Qty: 120 TABLET | Refills: 0 | Status: SHIPPED | OUTPATIENT
Start: 2019-08-17 | End: 2019-10-04

## 2019-08-17 NOTE — PROGRESS NOTES
The saga continues for this very fortunate individual.  He is a toe  who 1 year ago nearly to the day he developed acute appendicitis that subsequently led to the diagnosis of appendiceal cancer.   He had aggressive surgical and medical therap the fall Prevnar to be given please review all prescriptions watch for depression thank you

## 2019-09-19 ENCOUNTER — TELEPHONE (OUTPATIENT)
Dept: SURGERY | Facility: CLINIC | Age: 54
End: 2019-09-19

## 2019-09-19 ENCOUNTER — TELEPHONE (OUTPATIENT)
Dept: HEMATOLOGY/ONCOLOGY | Facility: HOSPITAL | Age: 54
End: 2019-09-19

## 2019-09-19 DIAGNOSIS — C18.9 MALIGNANT NEOPLASM OF COLON, UNSPECIFIED PART OF COLON (HCC): Primary | ICD-10-CM

## 2019-09-19 DIAGNOSIS — G62.9 NEUROPATHY: ICD-10-CM

## 2019-09-19 DIAGNOSIS — C18.1 PRIMARY APPENDICEAL ADENOCARCINOMA (HCC): Primary | ICD-10-CM

## 2019-09-19 RX ORDER — INDOMETHACIN 50 MG/1
CAPSULE ORAL
Qty: 120 CAPSULE | Refills: 3 | Status: SHIPPED | OUTPATIENT
Start: 2019-09-19

## 2019-09-19 NOTE — TELEPHONE ENCOUNTER
Toxicities:  Pt completed 6 cycles of Fluorouracil/Leucovorian/Cxaliplatin 4/2019  Now under surveillance.  Last appt with Dr Nell Santana 4/26/2019  Due for CT scan on 9/25/2019 & f/u appt with Dr Nell Santana on 10/25/2019     Peripheral Neuropathy    Peripheral Neur

## 2019-09-20 RX ORDER — GABAPENTIN 300 MG/1
300 CAPSULE ORAL 2 TIMES DAILY
Qty: 60 CAPSULE | Refills: 5 | Status: SHIPPED | OUTPATIENT
Start: 2019-09-20 | End: 2020-07-13

## 2019-09-20 NOTE — TELEPHONE ENCOUNTER
Spoke with wife regarding patient's neuropathy. Per Dr Allie Gillespie, \"Gabapentin is fine.  300 mg BID. Tsosie Channel sure he gets B12 and TSH and free T4 drawn with next labs. \"  Lab orders entered, RX sent to patient's pharmacy.

## 2019-09-25 ENCOUNTER — TELEPHONE (OUTPATIENT)
Dept: HEMATOLOGY/ONCOLOGY | Facility: HOSPITAL | Age: 54
End: 2019-09-25

## 2019-09-25 ENCOUNTER — HOSPITAL ENCOUNTER (OUTPATIENT)
Dept: CT IMAGING | Facility: HOSPITAL | Age: 54
Discharge: HOME | End: 2019-09-25
Attending: INTERNAL MEDICINE
Payer: COMMERCIAL

## 2019-09-25 DIAGNOSIS — C18.1 CANCER OF APPENDIX (HCC): ICD-10-CM

## 2019-09-25 LAB — CREAT BLD-MCNC: 1.1 MG/DL (ref 0.7–1.3)

## 2019-09-25 PROCEDURE — 74177 CT ABD & PELVIS W/CONTRAST: CPT | Performed by: INTERNAL MEDICINE

## 2019-09-25 PROCEDURE — 82565 ASSAY OF CREATININE: CPT

## 2019-09-25 NOTE — TELEPHONE ENCOUNTER
[employer] called regarding results of her husbands CT Scan that he had today. She would like to speak to Dr. Rogers Medina or Alex Ferraro.  Thank you

## 2019-09-25 NOTE — TELEPHONE ENCOUNTER
Spoke to patient's significant other - CT shows no cancer - small intestinal thickening is non-specific and doesn't need anything other than follow-up. His manjinder is up and energy up. Some bowel issues when he overeats. Some fatty liver.   Will see them i

## 2019-10-04 RX ORDER — HYDROCODONE BITARTRATE AND ACETAMINOPHEN 10; 325 MG/1; MG/1
1 TABLET ORAL EVERY 4 HOURS PRN
Qty: 120 TABLET | Refills: 0 | Status: SHIPPED | OUTPATIENT
Start: 2019-10-04 | End: 2019-11-05

## 2019-10-04 NOTE — TELEPHONE ENCOUNTER
Dr. Rell Ronquillo,  See below. Last refill of Norco 8/17/19  Last office visit 8/17/19.   Medication pended

## 2019-10-04 NOTE — TELEPHONE ENCOUNTER
Patient is going out of town tomorrow and would like to know if it is possible to refill his [place] before he leaves.     [address] in [place]

## 2019-10-08 ENCOUNTER — TELEPHONE (OUTPATIENT)
Dept: SURGERY | Facility: CLINIC | Age: 54
End: 2019-10-08

## 2019-10-08 NOTE — TELEPHONE ENCOUNTER
Pt called to give surgical instructions, pt answered but stated he was on vacation and requested that we call back after 10/10/2019.

## 2019-10-11 DIAGNOSIS — E88.81 METABOLIC SYNDROME: ICD-10-CM

## 2019-10-11 DIAGNOSIS — E03.9 HYPOTHYROIDISM, UNSPECIFIED TYPE: ICD-10-CM

## 2019-10-11 DIAGNOSIS — E78.1 HYPERTRIGLYCERIDEMIA: ICD-10-CM

## 2019-10-11 RX ORDER — LEVOTHYROXINE SODIUM 0.1 MG/1
TABLET ORAL
Qty: 90 TABLET | Refills: 0 | Status: SHIPPED | OUTPATIENT
Start: 2019-10-11 | End: 2020-01-20

## 2019-10-11 RX ORDER — FENOFIBRATE 160 MG/1
TABLET ORAL
Qty: 90 TABLET | Refills: 0 | Status: SHIPPED | OUTPATIENT
Start: 2019-10-11 | End: 2020-01-20

## 2019-10-17 ENCOUNTER — TELEPHONE (OUTPATIENT)
Dept: SURGERY | Facility: CLINIC | Age: 54
End: 2019-10-17

## 2019-10-25 ENCOUNTER — OFFICE VISIT (OUTPATIENT)
Dept: HEMATOLOGY/ONCOLOGY | Facility: HOSPITAL | Age: 54
End: 2019-10-25
Attending: INTERNAL MEDICINE
Payer: COMMERCIAL

## 2019-10-25 ENCOUNTER — TELEPHONE (OUTPATIENT)
Dept: HEMATOLOGY/ONCOLOGY | Facility: HOSPITAL | Age: 54
End: 2019-10-25

## 2019-10-25 VITALS
SYSTOLIC BLOOD PRESSURE: 129 MMHG | WEIGHT: 209.81 LBS | RESPIRATION RATE: 16 BRPM | TEMPERATURE: 97 F | DIASTOLIC BLOOD PRESSURE: 80 MMHG | BODY MASS INDEX: 32 KG/M2 | HEART RATE: 71 BPM | OXYGEN SATURATION: 97 %

## 2019-10-25 DIAGNOSIS — G62.9 NEUROPATHY: ICD-10-CM

## 2019-10-25 DIAGNOSIS — C18.1 PRIMARY APPENDICEAL ADENOCARCINOMA (HCC): ICD-10-CM

## 2019-10-25 DIAGNOSIS — C18.1 CANCER OF APPENDIX (HCC): Primary | ICD-10-CM

## 2019-10-25 DIAGNOSIS — E53.8 B12 DEFICIENCY: ICD-10-CM

## 2019-10-25 PROCEDURE — 80053 COMPREHEN METABOLIC PANEL: CPT

## 2019-10-25 PROCEDURE — 82607 VITAMIN B-12: CPT

## 2019-10-25 PROCEDURE — 36415 COLL VENOUS BLD VENIPUNCTURE: CPT

## 2019-10-25 PROCEDURE — 84439 ASSAY OF FREE THYROXINE: CPT

## 2019-10-25 PROCEDURE — 84443 ASSAY THYROID STIM HORMONE: CPT

## 2019-10-25 PROCEDURE — 99214 OFFICE O/P EST MOD 30 MIN: CPT | Performed by: INTERNAL MEDICINE

## 2019-10-25 PROCEDURE — 85025 COMPLETE CBC W/AUTO DIFF WBC: CPT

## 2019-10-25 RX ORDER — CYANOCOBALAMIN 1000 UG/ML
1000 INJECTION INTRAMUSCULAR; SUBCUTANEOUS ONCE
Status: CANCELLED | OUTPATIENT
Start: 2019-10-29

## 2019-10-25 NOTE — PROGRESS NOTES
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 He is feeling well. Appetite and energy level is good. He continues to have Neuropathy in both feet despite taking Gabapentin 300 mg twice daily. No further complaints.        Education Record    Learner:  Girish Ceballos

## 2019-10-25 NOTE — TELEPHONE ENCOUNTER
Spoke to patient about B12 level. Low and may be contributing to neuropathy. Not absorbing due to the surgery he had. Needs injections. Weekly X4, then monthly. He will call to schedule next week.   Von Bradley MD

## 2019-10-26 NOTE — PROGRESS NOTES
[facility]    PATIENT'S NAME: Mely Nickerson   ATTENDING PHYSICIAN: Deja Pang M.D.    PATIENT ACCOUNT #: [de-identified] LOCATION: [address] [place] [address] RECORD #: [id number] YOB: 1965   DATE OF SERVICE: 10/25/2019       CANCER C Blood pressure 129/81, pulse 71, respiratory rate 20, temperature 96.5. HEENT:  Unremarkable. LYMPHATICS:  He has no adenopathy. LUNGS:  Clear. HEART:  Normal.  ABDOMEN:  No hepatosplenomegaly or tenderness.   EXTREMITIES:  No clubbing, cyanosis, or e Faby Schmitz. Tyler Beltre M.D.    Carolyne Turner M.D.

## 2019-10-29 ENCOUNTER — OFFICE VISIT (OUTPATIENT)
Dept: HEMATOLOGY/ONCOLOGY | Facility: HOSPITAL | Age: 54
End: 2019-10-29
Attending: INTERNAL MEDICINE
Payer: COMMERCIAL

## 2019-10-29 ENCOUNTER — TELEPHONE (OUTPATIENT)
Dept: SURGERY | Facility: CLINIC | Age: 54
End: 2019-10-29

## 2019-10-29 DIAGNOSIS — E53.8 B12 DEFICIENCY: Primary | ICD-10-CM

## 2019-10-29 PROCEDURE — 96372 THER/PROPH/DIAG INJ SC/IM: CPT

## 2019-10-29 RX ORDER — CYANOCOBALAMIN 1000 UG/ML
1000 INJECTION INTRAMUSCULAR; SUBCUTANEOUS ONCE
Status: CANCELLED | OUTPATIENT
Start: 2019-11-05

## 2019-10-29 RX ORDER — CYANOCOBALAMIN 1000 UG/ML
1000 INJECTION INTRAMUSCULAR; SUBCUTANEOUS ONCE
Status: COMPLETED | OUTPATIENT
Start: 2019-10-29 | End: 2019-10-29

## 2019-10-29 RX ADMIN — CYANOCOBALAMIN 1000 MCG: 1000 INJECTION INTRAMUSCULAR; SUBCUTANEOUS at 15:54:00

## 2019-10-29 NOTE — TELEPHONE ENCOUNTER
Pt.'s wife Pacheco Carrero contacted our office explaining her frustration regarding the appt that was scheduled and canceled on 10/25.  Pt.'s wife was wanting to confirm whether or not they need an appt before 11/8 when the port will be removed, or if they are ok f

## 2019-10-30 ENCOUNTER — TELEPHONE (OUTPATIENT)
Dept: SURGERY | Facility: CLINIC | Age: 54
End: 2019-10-30

## 2019-10-30 NOTE — PROGRESS NOTES
Education Record    Learner:  Patient    Disease / Diagnosis: B12 deficiency    Barriers / Limitations:  None   Comments:    Method:  Brief focused   Comments:    General Topics:  Medication, Side effects and symptom management and Plan of care reviewed

## 2019-10-30 NOTE — TELEPHONE ENCOUNTER
Went over surgical checklist. All questions addressed at this time. Patient verbalized all understanding.

## 2019-11-04 ENCOUNTER — OFFICE VISIT (OUTPATIENT)
Dept: HEMATOLOGY/ONCOLOGY | Facility: HOSPITAL | Age: 54
End: 2019-11-04
Attending: INTERNAL MEDICINE
Payer: COMMERCIAL

## 2019-11-04 ENCOUNTER — TELEPHONE (OUTPATIENT)
Dept: SURGERY | Facility: CLINIC | Age: 54
End: 2019-11-04

## 2019-11-04 ENCOUNTER — TELEPHONE (OUTPATIENT)
Dept: FAMILY MEDICINE CLINIC | Facility: CLINIC | Age: 54
End: 2019-11-04

## 2019-11-04 ENCOUNTER — EKG ENCOUNTER (OUTPATIENT)
Dept: LAB | Facility: HOSPITAL | Age: 54
End: 2019-11-04
Payer: COMMERCIAL

## 2019-11-04 DIAGNOSIS — C18.1 PRIMARY APPENDICEAL ADENOCARCINOMA (HCC): ICD-10-CM

## 2019-11-04 DIAGNOSIS — E53.8 B12 DEFICIENCY: Primary | ICD-10-CM

## 2019-11-04 DIAGNOSIS — C18.9 MALIGNANT NEOPLASM OF COLON, UNSPECIFIED PART OF COLON (HCC): ICD-10-CM

## 2019-11-04 PROCEDURE — 93005 ELECTROCARDIOGRAM TRACING: CPT

## 2019-11-04 PROCEDURE — 36415 COLL VENOUS BLD VENIPUNCTURE: CPT

## 2019-11-04 PROCEDURE — 84439 ASSAY OF FREE THYROXINE: CPT

## 2019-11-04 PROCEDURE — 85025 COMPLETE CBC W/AUTO DIFF WBC: CPT

## 2019-11-04 PROCEDURE — 93010 ELECTROCARDIOGRAM REPORT: CPT | Performed by: INTERNAL MEDICINE

## 2019-11-04 PROCEDURE — 84443 ASSAY THYROID STIM HORMONE: CPT

## 2019-11-04 PROCEDURE — 96372 THER/PROPH/DIAG INJ SC/IM: CPT

## 2019-11-04 PROCEDURE — 80053 COMPREHEN METABOLIC PANEL: CPT

## 2019-11-04 PROCEDURE — 82306 VITAMIN D 25 HYDROXY: CPT

## 2019-11-04 RX ORDER — CYANOCOBALAMIN 1000 UG/ML
1000 INJECTION INTRAMUSCULAR; SUBCUTANEOUS ONCE
Status: COMPLETED | OUTPATIENT
Start: 2019-11-04 | End: 2019-11-04

## 2019-11-04 RX ORDER — CYANOCOBALAMIN 1000 UG/ML
1000 INJECTION INTRAMUSCULAR; SUBCUTANEOUS ONCE
Status: CANCELLED | OUTPATIENT
Start: 2019-11-11

## 2019-11-04 RX ADMIN — CYANOCOBALAMIN 1000 MCG: 1000 INJECTION INTRAMUSCULAR; SUBCUTANEOUS at 13:47:00

## 2019-11-04 NOTE — TELEPHONE ENCOUNTER
.Reason for the order/referral: REFERRAL REQUEST FOR SX PROCEDURE  PCP:  Lizzette Sandhu DO  Refer to Provider (first and last name): DR Deean Perry  Specialty: GENERAL SURGERY  Patient Insurance: Payor: [address] / Plan: TOM [facility] / Phyllis Trujillo

## 2019-11-04 NOTE — TELEPHONE ENCOUNTER
HYDROcodone-acetaminophen (NORCO)  MG Oral Tab    [address] [facility]    Please let patient know if they have to  or it gets sent over directly.

## 2019-11-04 NOTE — PROGRESS NOTES
Education Record    Learner:  Patient    Disease / Diagnosis:B12 inj    Barriers / Limitations:  None   Comments:    Method:  Discussion and Reinforcement   Comments:    General Topics:  Medication, Side effects and symptom management and Plan of care revi

## 2019-11-05 RX ORDER — HYDROCODONE BITARTRATE AND ACETAMINOPHEN 10; 325 MG/1; MG/1
1 TABLET ORAL EVERY 4 HOURS PRN
Qty: 120 TABLET | Refills: 0 | Status: SHIPPED | OUTPATIENT
Start: 2019-11-05 | End: 2019-12-06

## 2019-11-08 ENCOUNTER — HOSPITAL ENCOUNTER (OUTPATIENT)
Facility: HOSPITAL | Age: 54
Setting detail: HOSPITAL OUTPATIENT SURGERY
Discharge: HOME | End: 2019-11-08
Attending: SURGERY | Admitting: SURGERY
Payer: COMMERCIAL

## 2019-11-08 ENCOUNTER — ANESTHESIA (OUTPATIENT)
Dept: SURGERY | Facility: HOSPITAL | Age: 54
End: 2019-11-08
Payer: COMMERCIAL

## 2019-11-08 ENCOUNTER — ANESTHESIA EVENT (OUTPATIENT)
Dept: SURGERY | Facility: HOSPITAL | Age: 54
End: 2019-11-08
Payer: COMMERCIAL

## 2019-11-08 VITALS
RESPIRATION RATE: 18 BRPM | SYSTOLIC BLOOD PRESSURE: 104 MMHG | DIASTOLIC BLOOD PRESSURE: 69 MMHG | OXYGEN SATURATION: 97 % | TEMPERATURE: 98 F | HEART RATE: 57 BPM | BODY MASS INDEX: 32.34 KG/M2 | HEIGHT: 68 IN | WEIGHT: 213.38 LBS

## 2019-11-08 DIAGNOSIS — C18.9 MALIGNANT NEOPLASM OF COLON, UNSPECIFIED PART OF COLON (HCC): ICD-10-CM

## 2019-11-08 DIAGNOSIS — C18.1 PRIMARY APPENDICEAL ADENOCARCINOMA (HCC): Primary | ICD-10-CM

## 2019-11-08 PROCEDURE — 0JPT0WZ REMOVAL OF TOTALLY IMPLANTABLE VASCULAR ACCESS DEVICE FROM TRUNK SUBCUTANEOUS TISSUE AND FASCIA, OPEN APPROACH: ICD-10-PCS | Performed by: SURGERY

## 2019-11-08 PROCEDURE — 05PY03Z REMOVAL OF INFUSION DEVICE FROM UPPER VEIN, OPEN APPROACH: ICD-10-PCS | Performed by: SURGERY

## 2019-11-08 PROCEDURE — 88300 SURGICAL PATH GROSS: CPT | Performed by: SURGERY

## 2019-11-08 RX ORDER — CEFAZOLIN SODIUM/WATER 2 G/20 ML
2 SYRINGE (ML) INTRAVENOUS ONCE
Status: COMPLETED | OUTPATIENT
Start: 2019-11-08 | End: 2019-11-08

## 2019-11-08 RX ORDER — NALOXONE HYDROCHLORIDE 0.4 MG/ML
80 INJECTION, SOLUTION INTRAMUSCULAR; INTRAVENOUS; SUBCUTANEOUS AS NEEDED
Status: DISCONTINUED | OUTPATIENT
Start: 2019-11-08 | End: 2019-11-08

## 2019-11-08 RX ORDER — HYDROCODONE BITARTRATE AND ACETAMINOPHEN 5; 325 MG/1; MG/1
1 TABLET ORAL EVERY 6 HOURS PRN
Qty: 10 TABLET | Refills: 0 | Status: SHIPPED | OUTPATIENT
Start: 2019-11-08 | End: 2019-12-06 | Stop reason: DRUGHIGH

## 2019-11-08 RX ORDER — HEPARIN SODIUM 5000 [USP'U]/ML
5000 INJECTION, SOLUTION INTRAVENOUS; SUBCUTANEOUS ONCE
Status: COMPLETED | OUTPATIENT
Start: 2019-11-08 | End: 2019-11-08

## 2019-11-08 RX ORDER — ONDANSETRON 2 MG/ML
4 INJECTION INTRAMUSCULAR; INTRAVENOUS AS NEEDED
Status: DISCONTINUED | OUTPATIENT
Start: 2019-11-08 | End: 2019-11-08

## 2019-11-08 RX ORDER — HYDROCODONE BITARTRATE AND ACETAMINOPHEN 5; 325 MG/1; MG/1
2 TABLET ORAL AS NEEDED
Status: DISCONTINUED | OUTPATIENT
Start: 2019-11-08 | End: 2019-11-08

## 2019-11-08 RX ORDER — HYDROMORPHONE HYDROCHLORIDE 1 MG/ML
0.4 INJECTION, SOLUTION INTRAMUSCULAR; INTRAVENOUS; SUBCUTANEOUS EVERY 5 MIN PRN
Status: DISCONTINUED | OUTPATIENT
Start: 2019-11-08 | End: 2019-11-08

## 2019-11-08 RX ORDER — ACETAMINOPHEN 500 MG
1000 TABLET ORAL ONCE
Status: COMPLETED | OUTPATIENT
Start: 2019-11-08 | End: 2019-11-08

## 2019-11-08 RX ORDER — HYDROCODONE BITARTRATE AND ACETAMINOPHEN 5; 325 MG/1; MG/1
1 TABLET ORAL AS NEEDED
Status: DISCONTINUED | OUTPATIENT
Start: 2019-11-08 | End: 2019-11-08

## 2019-11-08 RX ORDER — SODIUM CHLORIDE, SODIUM LACTATE, POTASSIUM CHLORIDE, CALCIUM CHLORIDE 600; 310; 30; 20 MG/100ML; MG/100ML; MG/100ML; MG/100ML
INJECTION, SOLUTION INTRAVENOUS CONTINUOUS
Status: DISCONTINUED | OUTPATIENT
Start: 2019-11-08 | End: 2019-11-08

## 2019-11-08 RX ORDER — BUPIVACAINE HYDROCHLORIDE AND EPINEPHRINE 5; 5 MG/ML; UG/ML
INJECTION, SOLUTION EPIDURAL; INTRACAUDAL; PERINEURAL AS NEEDED
Status: DISCONTINUED | OUTPATIENT
Start: 2019-11-08 | End: 2019-11-08 | Stop reason: HOSPADM

## 2019-11-08 RX ORDER — LIDOCAINE HYDROCHLORIDE 10 MG/ML
INJECTION, SOLUTION EPIDURAL; INFILTRATION; INTRACAUDAL; PERINEURAL AS NEEDED
Status: DISCONTINUED | OUTPATIENT
Start: 2019-11-08 | End: 2019-11-08 | Stop reason: SURG

## 2019-11-08 RX ORDER — METOCLOPRAMIDE HYDROCHLORIDE 5 MG/ML
10 INJECTION INTRAMUSCULAR; INTRAVENOUS AS NEEDED
Status: DISCONTINUED | OUTPATIENT
Start: 2019-11-08 | End: 2019-11-08

## 2019-11-08 RX ADMIN — CEFAZOLIN SODIUM/WATER 2 G: 2 G/20 ML SYRINGE (ML) INTRAVENOUS at 11:30:00

## 2019-11-08 RX ADMIN — LIDOCAINE HYDROCHLORIDE 50 MG: 10 INJECTION, SOLUTION EPIDURAL; INFILTRATION; INTRACAUDAL; PERINEURAL at 11:28:00

## 2019-11-08 NOTE — ANESTHESIA PREPROCEDURE EVALUATION
PRE-OP EVALUATION    Patient Name: Corazon Guajardo    Pre-op Diagnosis: Malignant neoplasm of colon, unspecified part of colon ([facility] [C18.9]    Procedure(s):  PORTACATH REMOVAL    Surgeon(s) and Role:     Loretta Doyle MD - Primary    Pre-op vitals Disp: 90 tablet, Rfl: 3  Budesonide-Formoterol Fumarate (SYMBICORT) 160-4.5 MCG/ACT Inhalation Aerosol, INHALE 2 PUFFS BY MOUTH TWICE DAILY (Patient taking differently: as needed.  INHALE 2 PUFFS BY MOUTH TWICE DAILY ), Disp: 3 Inhaler, Rfl: 3        Allerg intraperitoneal chemotherapy using mitomycin C on 12/11/18.     • OTHER SURGICAL HISTORY  01/26/2019    Debridement, abdominal wall wound, to include soft tissue and fascia, Vacuum dressing application     Social History    Tobacco Use      Smoking status:

## 2019-11-08 NOTE — ANESTHESIA POSTPROCEDURE EVALUATION
[address] Patient Status:  Hospital Outpatient Surgery   Age/Gender 62year old male MRN [id number]   [facility] SURGERY Attending Milad Apple MD   [facility] Day # 0 PCP June Flood, DO       Anesthesia Post-op

## 2019-11-08 NOTE — BRIEF OP NOTE
Pre-Operative Diagnosis: Malignant neoplasm of colon, unspecified part of colon ([facility] [C18.9]     Post-Operative Diagnosis: Malignant neoplasm of colon, unspecified part of colon ([facility] [C18.9]      Procedure Performed:   Procedure(s):  PORTACATH REMOVAL

## 2019-11-08 NOTE — OPERATIVE REPORT
[facility]  Operative Note    Yvone Hy Location: OR   CSN [id number] MRN [id number]   Admission Date 11/8/2019 Operation Date 11/8/2019   Attending Physician Tin oCto MD Operating Physician Jean Carlos Nevarez MD     Date of procedure: today  Preoperative lab work including CEA is ordered  He will require preoperative screening colonoscopy-this is scheduled for December 7 with Dr. Allison young after laparoscopic appendectomy. Jd Braden has not had prior screening colonoscopy in the past  Rocio Blank draped in usual sterile fashion. Using local anesthesia an incision was made in the area of the preoperatively marked port. The stay suture were removed and the port with catheter was retrieved.   The tip of the catheter was examined and found to be intac

## 2019-11-08 NOTE — H&P
History & Physical Examination    Patient Name: Gerri Salmeron  MRN: [id number]  CSN: [id number]  YOB: 1965    Diagnosis: port in place- completed CTX- needs port removal    Present Illness52year-old gentleman who was recently admitted fo brought back to OR on 1-26-19 for wound debridement: . Port pplaced 2-.  Pt has completed ctx and needs port removed    FENOFIBRATE 160 MG Oral Tab, TAKE 1 TABLET(160 MG) BY MOUTH DAILY, Disp: 90 tablet, Rfl: 0, 11/7/2019 at 0800  LEVOTHYROXINE SODI (Patient taking differently: Take 20 mg by mouth as needed.  ), Disp: 14 tablet, Rfl: 0, More than a month at Unknown time  LORazepam 0.5 MG Oral Tab, Take 1 tablet (0.5 mg total) by mouth 2 (two) times daily as needed for Anxiety. , Disp: 30 tablet, Rfl: 0 by Glenn Singer MD at [facility] MAIN OR   • HERNIA SURGERY  78/08/3003    umbilical   • IRRIGATION & DEBRIDEMENT N/A 1/26/2019    Performed by Glenn Singer MD at [address] 11/12/2018    Performed by Dinesh Rhodes MD at [facility]

## 2019-11-18 ENCOUNTER — OFFICE VISIT (OUTPATIENT)
Dept: HEMATOLOGY/ONCOLOGY | Facility: HOSPITAL | Age: 54
End: 2019-11-18
Attending: INTERNAL MEDICINE
Payer: COMMERCIAL

## 2019-11-18 ENCOUNTER — TELEPHONE (OUTPATIENT)
Dept: FAMILY MEDICINE CLINIC | Facility: CLINIC | Age: 54
End: 2019-11-18

## 2019-11-18 DIAGNOSIS — E53.8 B12 DEFICIENCY: Primary | ICD-10-CM

## 2019-11-18 PROCEDURE — 96372 THER/PROPH/DIAG INJ SC/IM: CPT

## 2019-11-18 RX ORDER — BUDESONIDE AND FORMOTEROL FUMARATE DIHYDRATE 160; 4.5 UG/1; UG/1
AEROSOL RESPIRATORY (INHALATION)
Qty: 3 INHALER | Refills: 0 | Status: SHIPPED | OUTPATIENT
Start: 2019-11-18 | End: 2019-12-06

## 2019-11-18 RX ORDER — CYANOCOBALAMIN 1000 UG/ML
1000 INJECTION INTRAMUSCULAR; SUBCUTANEOUS ONCE
Status: CANCELLED | OUTPATIENT
Start: 2019-11-25

## 2019-11-18 RX ORDER — CYANOCOBALAMIN 1000 UG/ML
1000 INJECTION INTRAMUSCULAR; SUBCUTANEOUS ONCE
Status: COMPLETED | OUTPATIENT
Start: 2019-11-18 | End: 2019-11-18

## 2019-11-18 RX ADMIN — CYANOCOBALAMIN 1000 MCG: 1000 INJECTION INTRAMUSCULAR; SUBCUTANEOUS at 14:20:00

## 2019-11-19 ENCOUNTER — TELEPHONE (OUTPATIENT)
Dept: HEMATOLOGY/ONCOLOGY | Facility: HOSPITAL | Age: 54
End: 2019-11-19

## 2019-11-19 NOTE — TELEPHONE ENCOUNTER
Patient's wife has some questions for Stacey Goodwin and a request, she didn't wish to share any information, she only wanted to speak to you, please advise.

## 2019-11-19 NOTE — TELEPHONE ENCOUNTER
Spoke with wife. Patient has not been able to go to the gym since he was diagnosed in 2018. She is asking for a formal letter indicating he is under Dr Jero Norton care. Message forwarded to Ned Asif, .

## 2019-11-20 ENCOUNTER — SOCIAL WORK SERVICES (OUTPATIENT)
Dept: HEMATOLOGY/ONCOLOGY | Facility: HOSPITAL | Age: 54
End: 2019-11-20

## 2019-11-20 NOTE — PROGRESS NOTES
SW completed a letter of medical necessity for patient to cancel his membership and to try to get some reimbursement from them. AZEB e-mailed the letter to the patients wife at [email or web address]

## 2019-11-21 ENCOUNTER — TELEPHONE (OUTPATIENT)
Dept: SURGERY | Facility: CLINIC | Age: 54
End: 2019-11-21

## 2019-11-21 DIAGNOSIS — C18.1 PRIMARY APPENDICEAL ADENOCARCINOMA (HCC): Primary | ICD-10-CM

## 2019-11-21 RX ORDER — POLYETHYLENE GLYCOL 3350, SODIUM CHLORIDE, SODIUM BICARBONATE, POTASSIUM CHLORIDE 420; 11.2; 5.72; 1.48 G/4L; G/4L; G/4L; G/4L
POWDER, FOR SOLUTION ORAL
Qty: 1 BOTTLE | Refills: 0 | Status: SHIPPED | OUTPATIENT
Start: 2019-11-21

## 2019-11-21 NOTE — TELEPHONE ENCOUNTER
Pt needs colonoscopy and is now scheduled with Dr. Margo Stewart.  Instructions sent via My Chart and script sent to pharmacy

## 2019-11-25 ENCOUNTER — OFFICE VISIT (OUTPATIENT)
Dept: HEMATOLOGY/ONCOLOGY | Facility: HOSPITAL | Age: 54
End: 2019-11-25
Attending: INTERNAL MEDICINE
Payer: COMMERCIAL

## 2019-11-25 DIAGNOSIS — E53.8 B12 DEFICIENCY: Primary | ICD-10-CM

## 2019-11-25 PROCEDURE — 96372 THER/PROPH/DIAG INJ SC/IM: CPT

## 2019-11-25 RX ORDER — CYANOCOBALAMIN 1000 UG/ML
1000 INJECTION INTRAMUSCULAR; SUBCUTANEOUS ONCE
Status: CANCELLED | OUTPATIENT
Start: 2019-12-03

## 2019-11-25 RX ORDER — CYANOCOBALAMIN 1000 UG/ML
1000 INJECTION INTRAMUSCULAR; SUBCUTANEOUS ONCE
Status: COMPLETED | OUTPATIENT
Start: 2019-11-25 | End: 2019-11-25

## 2019-11-25 RX ADMIN — CYANOCOBALAMIN 1000 MCG: 1000 INJECTION INTRAMUSCULAR; SUBCUTANEOUS at 14:47:00

## 2019-11-25 NOTE — PROGRESS NOTES
Reviewed with patient that following 4 weekly injections of B12, the MD ordered it Monthly. He stated he would have his wife call and schedule appointments.

## 2019-12-02 ENCOUNTER — TELEPHONE (OUTPATIENT)
Dept: HEMATOLOGY/ONCOLOGY | Facility: HOSPITAL | Age: 54
End: 2019-12-02

## 2019-12-02 ENCOUNTER — APPOINTMENT (OUTPATIENT)
Dept: HEMATOLOGY/ONCOLOGY | Facility: HOSPITAL | Age: 54
End: 2019-12-02
Attending: INTERNAL MEDICINE
Payer: COMMERCIAL

## 2019-12-02 NOTE — TELEPHONE ENCOUNTER
Patient on schedule for today and for next few weeks for weekly b12 injection; however, per MD note patient was to have weekly inj x 4, and then monthly after that. Patient already had his weekly inj x 4.  Informed wife that it will now be monthly and that

## 2019-12-06 ENCOUNTER — OFFICE VISIT (OUTPATIENT)
Dept: FAMILY MEDICINE CLINIC | Facility: CLINIC | Age: 54
End: 2019-12-06
Payer: COMMERCIAL

## 2019-12-06 VITALS
OXYGEN SATURATION: 98 % | SYSTOLIC BLOOD PRESSURE: 138 MMHG | WEIGHT: 210 LBS | HEART RATE: 68 BPM | DIASTOLIC BLOOD PRESSURE: 86 MMHG | TEMPERATURE: 98 F | BODY MASS INDEX: 31.83 KG/M2 | HEIGHT: 68 IN | RESPIRATION RATE: 16 BRPM

## 2019-12-06 DIAGNOSIS — R42 DIZZINESS: Primary | ICD-10-CM

## 2019-12-06 PROBLEM — G60.9 IDIOPATHIC PERIPHERAL NEUROPATHY: Status: ACTIVE | Noted: 2019-12-06

## 2019-12-06 PROCEDURE — 99213 OFFICE O/P EST LOW 20 MIN: CPT | Performed by: FAMILY MEDICINE

## 2019-12-06 RX ORDER — HYDROCODONE BITARTRATE AND ACETAMINOPHEN 10; 325 MG/1; MG/1
1 TABLET ORAL EVERY 4 HOURS PRN
Qty: 120 TABLET | Refills: 0 | Status: SHIPPED | OUTPATIENT
Start: 2019-12-06 | End: 2020-01-02

## 2019-12-06 RX ORDER — BUDESONIDE AND FORMOTEROL FUMARATE DIHYDRATE 160; 4.5 UG/1; UG/1
AEROSOL RESPIRATORY (INHALATION)
Qty: 3 INHALER | Refills: 3 | Status: SHIPPED | OUTPATIENT
Start: 2019-12-06 | End: 2020-10-12

## 2019-12-06 RX ORDER — KETOCONAZOLE 20 MG/G
1 CREAM TOPICAL 2 TIMES DAILY
Qty: 60 G | Refills: 1 | Status: SHIPPED | OUTPATIENT
Start: 2019-12-06 | End: 2020-04-27

## 2019-12-06 NOTE — PROGRESS NOTES
Patient is here. He is a survivor of disseminated appendiceal carcinoma. He has had extensive surgery and revisions of surgery and has done remarkably well. He does need refills of his Symbicort and his Norco which he uses for pain relief.   He also need

## 2019-12-09 ENCOUNTER — APPOINTMENT (OUTPATIENT)
Dept: HEMATOLOGY/ONCOLOGY | Facility: HOSPITAL | Age: 54
End: 2019-12-09
Attending: INTERNAL MEDICINE
Payer: COMMERCIAL

## 2019-12-12 ENCOUNTER — TELEPHONE (OUTPATIENT)
Dept: SURGERY | Facility: CLINIC | Age: 54
End: 2019-12-12

## 2019-12-12 NOTE — TELEPHONE ENCOUNTER
Wife called and left message with PSR requesting instructions for c-scope released to Mychart. Instructions released to Mychart again for patient.

## 2019-12-16 ENCOUNTER — APPOINTMENT (OUTPATIENT)
Dept: HEMATOLOGY/ONCOLOGY | Facility: HOSPITAL | Age: 54
End: 2019-12-16
Attending: INTERNAL MEDICINE
Payer: COMMERCIAL

## 2019-12-18 ENCOUNTER — TELEPHONE (OUTPATIENT)
Dept: SURGERY | Facility: CLINIC | Age: 54
End: 2019-12-18

## 2019-12-19 ENCOUNTER — TELEPHONE (OUTPATIENT)
Dept: SURGERY | Facility: CLINIC | Age: 54
End: 2019-12-19

## 2019-12-19 DIAGNOSIS — C18.1 PRIMARY APPENDICEAL ADENOCARCINOMA (HCC): Primary | ICD-10-CM

## 2019-12-19 NOTE — TELEPHONE ENCOUNTER
scheduled for colonoscopy tomorrow and is wondering what time. Explained that schedule is not set until 12:00 the day before and they will call. Reviewed instructions and wife stated that it did no say only \"Clear liquids\" per the my chart msg.  C

## 2019-12-20 NOTE — TELEPHONE ENCOUNTER
Per pt spouse, states pt is vomiting and has a fever. Spouse does not feel comfortable with having procedure at this time. Colonoscopy canceled. Dr Margo Stewart informed.        Orders Placed This Encounter      Surgical Case Change Request          Order Comme

## 2019-12-30 ENCOUNTER — APPOINTMENT (OUTPATIENT)
Dept: HEMATOLOGY/ONCOLOGY | Facility: HOSPITAL | Age: 54
End: 2019-12-30
Attending: INTERNAL MEDICINE
Payer: COMMERCIAL

## 2020-01-02 DIAGNOSIS — E88.81 METABOLIC SYNDROME: ICD-10-CM

## 2020-01-02 DIAGNOSIS — E78.1 HYPERTRIGLYCERIDEMIA: ICD-10-CM

## 2020-01-02 RX ORDER — HYDROCODONE BITARTRATE AND ACETAMINOPHEN 10; 325 MG/1; MG/1
1 TABLET ORAL EVERY 4 HOURS PRN
Qty: 120 TABLET | Refills: 0 | Status: SHIPPED | OUTPATIENT
Start: 2020-01-02 | End: 2020-01-31

## 2020-01-02 RX ORDER — PRAVASTATIN SODIUM 20 MG
TABLET ORAL
Qty: 90 TABLET | Refills: 3 | Status: SHIPPED | OUTPATIENT
Start: 2020-01-02 | End: 2021-04-21

## 2020-01-02 NOTE — TELEPHONE ENCOUNTER
Pt needs refill of hydrocodone  [facility] [address] [place]  Pt is out of pills please fill today if possible

## 2020-01-18 DIAGNOSIS — E88.81 METABOLIC SYNDROME: ICD-10-CM

## 2020-01-18 DIAGNOSIS — E78.1 HYPERTRIGLYCERIDEMIA: ICD-10-CM

## 2020-01-18 DIAGNOSIS — E03.9 HYPOTHYROIDISM, UNSPECIFIED TYPE: ICD-10-CM

## 2020-01-20 ENCOUNTER — APPOINTMENT (OUTPATIENT)
Dept: HEMATOLOGY/ONCOLOGY | Facility: HOSPITAL | Age: 55
End: 2020-01-20
Attending: INTERNAL MEDICINE
Payer: COMMERCIAL

## 2020-01-20 ENCOUNTER — TELEPHONE (OUTPATIENT)
Dept: FAMILY MEDICINE CLINIC | Facility: CLINIC | Age: 55
End: 2020-01-20

## 2020-01-20 ENCOUNTER — TELEPHONE (OUTPATIENT)
Dept: HEMATOLOGY/ONCOLOGY | Facility: HOSPITAL | Age: 55
End: 2020-01-20

## 2020-01-20 DIAGNOSIS — C18.1 PRIMARY APPENDICEAL ADENOCARCINOMA (HCC): Primary | ICD-10-CM

## 2020-01-20 RX ORDER — FENOFIBRATE 160 MG/1
TABLET ORAL
Qty: 90 TABLET | Refills: 3 | Status: SHIPPED | OUTPATIENT
Start: 2020-01-20 | End: 2021-04-21

## 2020-01-20 RX ORDER — LEVOTHYROXINE SODIUM 0.1 MG/1
TABLET ORAL
Qty: 90 TABLET | Refills: 3 | Status: SHIPPED | OUTPATIENT
Start: 2020-01-20 | End: 2021-01-11

## 2020-01-20 NOTE — TELEPHONE ENCOUNTER
Rohan's wife called and was looking to speak with Caron Yung about imaging for her . She asked that she get a call back to go over this she has a few other questions as well.

## 2020-01-20 NOTE — TELEPHONE ENCOUNTER
Spoke with spouse- states pt will be getting CT scan done end of March and will see MD in April. She is asking for lab orders to be placed because pt plans to have them drawn same day as the scan. Will route to MD for future lab orders.

## 2020-01-27 ENCOUNTER — OFFICE VISIT (OUTPATIENT)
Dept: HEMATOLOGY/ONCOLOGY | Facility: HOSPITAL | Age: 55
End: 2020-01-27
Attending: INTERNAL MEDICINE
Payer: COMMERCIAL

## 2020-01-27 DIAGNOSIS — E53.8 B12 DEFICIENCY: Primary | ICD-10-CM

## 2020-01-27 PROCEDURE — 96372 THER/PROPH/DIAG INJ SC/IM: CPT

## 2020-01-27 RX ORDER — CYANOCOBALAMIN 1000 UG/ML
1000 INJECTION INTRAMUSCULAR; SUBCUTANEOUS ONCE
Status: CANCELLED | OUTPATIENT
Start: 2020-02-24

## 2020-01-27 RX ORDER — CYANOCOBALAMIN 1000 UG/ML
1000 INJECTION INTRAMUSCULAR; SUBCUTANEOUS ONCE
Status: COMPLETED | OUTPATIENT
Start: 2020-01-27 | End: 2020-01-27

## 2020-01-27 RX ADMIN — CYANOCOBALAMIN 1000 MCG: 1000 INJECTION INTRAMUSCULAR; SUBCUTANEOUS at 13:04:00

## 2020-01-27 NOTE — PROGRESS NOTES
Education Record    Learner:  Patient    Disease / Diagnosis: b12 monthly    Barriers / Limitations:  None   Comments:    Method:  Brief focused   Comments:    General Topics:  Plan of care reviewed   Comments:    Outcome:  Shows understanding   Comments:

## 2020-01-31 RX ORDER — HYDROCODONE BITARTRATE AND ACETAMINOPHEN 10; 325 MG/1; MG/1
1 TABLET ORAL EVERY 4 HOURS PRN
Qty: 120 TABLET | Refills: 0 | Status: SHIPPED | OUTPATIENT
Start: 2020-01-31 | End: 2020-02-28

## 2020-02-24 ENCOUNTER — OFFICE VISIT (OUTPATIENT)
Dept: HEMATOLOGY/ONCOLOGY | Facility: HOSPITAL | Age: 55
End: 2020-02-24
Attending: INTERNAL MEDICINE
Payer: COMMERCIAL

## 2020-02-24 DIAGNOSIS — E53.8 B12 DEFICIENCY: Primary | ICD-10-CM

## 2020-02-24 PROCEDURE — 96372 THER/PROPH/DIAG INJ SC/IM: CPT

## 2020-02-24 RX ORDER — CYANOCOBALAMIN 1000 UG/ML
1000 INJECTION INTRAMUSCULAR; SUBCUTANEOUS ONCE
Status: COMPLETED | OUTPATIENT
Start: 2020-02-24 | End: 2020-02-24

## 2020-02-24 RX ORDER — CYANOCOBALAMIN 1000 UG/ML
1000 INJECTION INTRAMUSCULAR; SUBCUTANEOUS ONCE
Status: CANCELLED | OUTPATIENT
Start: 2020-03-23

## 2020-02-24 RX ADMIN — CYANOCOBALAMIN 1000 MCG: 1000 INJECTION INTRAMUSCULAR; SUBCUTANEOUS at 13:20:00

## 2020-02-24 NOTE — PROGRESS NOTES
Education Record    Learner:  Patient    Disease / Diagnosis: Vit b12 Deficiency    Barriers / Limitations:  None   Comments:    Method:  Brief focused   Comments:    General Topics:  Plan of care reviewed   Comments:    Outcome:  Shows understanding   Com

## 2020-02-28 RX ORDER — HYDROCODONE BITARTRATE AND ACETAMINOPHEN 10; 325 MG/1; MG/1
1 TABLET ORAL EVERY 4 HOURS PRN
Qty: 120 TABLET | Refills: 0 | Status: SHIPPED | OUTPATIENT
Start: 2020-02-28 | End: 2020-03-19

## 2020-03-19 RX ORDER — HYDROCODONE BITARTRATE AND ACETAMINOPHEN 10; 325 MG/1; MG/1
1 TABLET ORAL EVERY 4 HOURS PRN
Qty: 120 TABLET | Refills: 0 | Status: SHIPPED | OUTPATIENT
Start: 2020-03-19 | End: 2020-04-07

## 2020-04-07 ENCOUNTER — TELEPHONE (OUTPATIENT)
Dept: HEMATOLOGY/ONCOLOGY | Facility: HOSPITAL | Age: 55
End: 2020-04-07

## 2020-04-07 RX ORDER — HYDROCODONE BITARTRATE AND ACETAMINOPHEN 10; 325 MG/1; MG/1
1 TABLET ORAL EVERY 4 HOURS PRN
Qty: 120 TABLET | Refills: 0 | Status: SHIPPED | OUTPATIENT
Start: 2020-04-07 | End: 2020-04-27

## 2020-04-27 RX ORDER — HYDROCODONE BITARTRATE AND ACETAMINOPHEN 10; 325 MG/1; MG/1
1 TABLET ORAL EVERY 4 HOURS PRN
Qty: 120 TABLET | Refills: 0 | Status: SHIPPED | OUTPATIENT
Start: 2020-04-27 | End: 2020-05-15

## 2020-04-27 RX ORDER — KETOCONAZOLE 20 MG/G
1 CREAM TOPICAL 2 TIMES DAILY
Qty: 60 G | Refills: 1 | Status: SHIPPED | OUTPATIENT
Start: 2020-04-27 | End: 2020-12-18

## 2020-04-27 NOTE — TELEPHONE ENCOUNTER
Please approve/deny last ov 12/6 last refill for Norco 4/7/2020 last refill for ketoconazole 12/6/2019

## 2020-05-15 ENCOUNTER — TELEPHONE (OUTPATIENT)
Dept: FAMILY MEDICINE CLINIC | Facility: CLINIC | Age: 55
End: 2020-05-15

## 2020-05-15 RX ORDER — HYDROCODONE BITARTRATE AND ACETAMINOPHEN 10; 325 MG/1; MG/1
1 TABLET ORAL EVERY 4 HOURS PRN
Qty: 120 TABLET | Refills: 0 | Status: SHIPPED | OUTPATIENT
Start: 2020-05-15 | End: 2020-06-03

## 2020-06-03 RX ORDER — HYDROCODONE BITARTRATE AND ACETAMINOPHEN 10; 325 MG/1; MG/1
1 TABLET ORAL EVERY 4 HOURS PRN
Qty: 120 TABLET | Refills: 0 | Status: SHIPPED | OUTPATIENT
Start: 2020-06-03 | End: 2020-06-23

## 2020-06-05 ENCOUNTER — HOSPITAL ENCOUNTER (OUTPATIENT)
Dept: CT IMAGING | Facility: HOSPITAL | Age: 55
Discharge: HOME | End: 2020-06-05
Attending: INTERNAL MEDICINE
Payer: COMMERCIAL

## 2020-06-05 ENCOUNTER — TELEPHONE (OUTPATIENT)
Dept: SURGERY | Facility: CLINIC | Age: 55
End: 2020-06-05

## 2020-06-05 ENCOUNTER — TELEPHONE (OUTPATIENT)
Dept: HEMATOLOGY/ONCOLOGY | Facility: HOSPITAL | Age: 55
End: 2020-06-05

## 2020-06-05 DIAGNOSIS — C18.1 CANCER OF APPENDIX (HCC): ICD-10-CM

## 2020-06-05 PROCEDURE — 71260 CT THORAX DX C+: CPT | Performed by: INTERNAL MEDICINE

## 2020-06-05 PROCEDURE — 82565 ASSAY OF CREATININE: CPT

## 2020-06-05 PROCEDURE — 74177 CT ABD & PELVIS W/CONTRAST: CPT | Performed by: INTERNAL MEDICINE

## 2020-06-05 NOTE — TELEPHONE ENCOUNTER
Pernell Rincon called asking if Jo Cr needs labs done before his f/u 6/12 to go over his CT.  Please call

## 2020-06-05 NOTE — TELEPHONE ENCOUNTER
CT with localized free air communicated by radiology staff to Dr Elizabeth Burr. CT reviewed. I called patient. He is en route for a root canal procedure. Otherwise, he has been feeling well without fevers/chills/abd complaints. He has been \"working hard\".

## 2020-06-08 ENCOUNTER — TELEPHONE (OUTPATIENT)
Dept: HEMATOLOGY/ONCOLOGY | Facility: HOSPITAL | Age: 55
End: 2020-06-08

## 2020-06-08 ENCOUNTER — TELEPHONE (OUTPATIENT)
Dept: SURGERY | Facility: CLINIC | Age: 55
End: 2020-06-08

## 2020-06-08 NOTE — TELEPHONE ENCOUNTER
Wife Bunny Click calling regarding scan that Catherine Ordonez had last Friday and the results.  Thank you Noe Mercedes

## 2020-06-08 NOTE — TELEPHONE ENCOUNTER
Called and spoke to patient's life partner Jonesradu Jackson. She reports that Dat Thomas was doing excellent up until Saturday. She reports he has noted some LUQ pain (mild) or sensation of a \"gas bubble\" in that region. Denies fever, chills, diarrhea, or constipation.  H

## 2020-06-08 NOTE — TELEPHONE ENCOUNTER
Spoke to patient's life partner.   Explained sequence of events on Friday and that Dr Miguel George needed to have Skip Cook come in soon for exam.  Was reportedly asymptomatic when talking to Dr Miguel George on Friday but on Sat and Sunday has had left upper quadrant pain (

## 2020-06-09 ENCOUNTER — OFFICE VISIT (OUTPATIENT)
Dept: SURGERY | Facility: CLINIC | Age: 55
End: 2020-06-09
Payer: COMMERCIAL

## 2020-06-09 ENCOUNTER — NURSE ONLY (OUTPATIENT)
Dept: HEMATOLOGY/ONCOLOGY | Facility: HOSPITAL | Age: 55
End: 2020-06-09
Attending: INTERNAL MEDICINE
Payer: COMMERCIAL

## 2020-06-09 VITALS
DIASTOLIC BLOOD PRESSURE: 77 MMHG | HEART RATE: 59 BPM | OXYGEN SATURATION: 95 % | TEMPERATURE: 98 F | SYSTOLIC BLOOD PRESSURE: 138 MMHG

## 2020-06-09 DIAGNOSIS — R93.3 ABNORMAL CT SCAN, GASTROINTESTINAL TRACT: Primary | ICD-10-CM

## 2020-06-09 PROCEDURE — 99213 OFFICE O/P EST LOW 20 MIN: CPT | Performed by: PHYSICIAN ASSISTANT

## 2020-06-09 PROCEDURE — 36415 COLL VENOUS BLD VENIPUNCTURE: CPT

## 2020-06-09 NOTE — PROGRESS NOTES
[facility]    Patient Name:  Mari Romero   YOB: 1965   Gender:  Male   Appt Date:  6/6/2020   Provider:  Vero Ryan MD   Insurance:  TOM Louis 45, DO   Add 1/26/19: Debridement, abdominal wall wound, to include soft tissue and fascia. Vacuum dressing application.      4/2019: Completed 6 cycles of FOLFOX    The patient underwent screening CT abd/pelvis on 6/5/2020 which noted increased inflammatory changes ass •  PEG 3350-KCl-Na Bicarb-NaCl (TRILYTE) 420 g Oral Recon Soln, Starting at 4:00 pm the night before procedure, drink 8 ounces of the prep every 15-20 minutes until finished, Disp: 1 Bottle, Rfl: 0  •  gabapentin 300 MG Oral Cap, Take 1 capsule (300 mg tot Past Surgical History:   Procedure Laterality Date   • Appendectomy  11/12/2018    interval appey   • Cholecystectomy  2008   • Colonoscopy N/A 12/7/2018    Procedure: COLONOSCOPY, POSSIBLE BIOPSY, POSSIBLE POLYPECTOMY 79173;  Surgeon: Dale Ramirez MD; Constitutional: General Appearance: healthy-appearing, well-nourished, and well-developed. Level of Distress: NAD. Eyes: Sclerae: non-icteric. Neck: Neck: supple.    Lymph Nodes: Lymph Nodes no cervical LAD, supraclavicular LAD, axillary LAD, or inguina There are no acute issues. Patient remains stable, afebrile, and relatively pain free. We discussed imaging in detail. Discussed signs and symptoms for concern including fever, chills, or worsening abdominal pain.  They know should symptoms change or worsen

## 2020-06-10 ENCOUNTER — TELEPHONE (OUTPATIENT)
Dept: HEMATOLOGY/ONCOLOGY | Facility: HOSPITAL | Age: 55
End: 2020-06-10

## 2020-06-10 NOTE — TELEPHONE ENCOUNTER
[place] called and was looking to speak with the Dr or nurse about Virginia Song upcoming apt. She asked that she get a call back to go over this.

## 2020-06-10 NOTE — TELEPHONE ENCOUNTER
Per Dr Chetna Burgos, No follow up needed this Friday. Patient to get labs and see MD in 3 months. Repeat imaging will depend on what happens with Dr Donald Lea follow up. Called wife and left her a voicemail with this information.

## 2020-06-10 NOTE — TELEPHONE ENCOUNTER
Patient is scheduled to see Dr Stephon Irizarry on Friday. Wife is asking if they still need to come in? Patient had labs drawn and saw Dr Klaudia Knight this week. Message forwarded to Dr Stephon Irizarry to advise.

## 2020-06-12 ENCOUNTER — TELEPHONE (OUTPATIENT)
Dept: SURGERY | Facility: CLINIC | Age: 55
End: 2020-06-12

## 2020-06-12 ENCOUNTER — APPOINTMENT (OUTPATIENT)
Dept: HEMATOLOGY/ONCOLOGY | Facility: HOSPITAL | Age: 55
End: 2020-06-12
Attending: INTERNAL MEDICINE
Payer: COMMERCIAL

## 2020-06-12 NOTE — TELEPHONE ENCOUNTER
Spoke with wife who reports patient is doing well and states the minor pain he had last week is improving. Denies fever, chills.

## 2020-06-18 NOTE — TELEPHONE ENCOUNTER
Patient called and apologized that he was calling so late. He is going out of town tomorrow and is not due to renew his pain medication until Sunday. Can we please call to authorize an early refill. He would appreciate it and apologized again.

## 2020-06-18 NOTE — TELEPHONE ENCOUNTER
Dr. Nikki Clark,  Patient asking for early refill of Norco.  Last filled 6/3/2020 for #120- 2 weeks ago    Medication pended - please advise.

## 2020-06-19 NOTE — TELEPHONE ENCOUNTER
Patient is calling. He is now in [facility] and still needs his [address] filled.     Please have it sent to [address], [facility], [address]   Phone [phone]

## 2020-06-22 NOTE — TELEPHONE ENCOUNTER
PT CALLING BACK ABOUT HIS MED REFILL. HE NEEDS THIS TODAY. HE IS OUT OF STATE. WHEN WILL WE BE SENDING THIS MED?      [phone]   PT #  CALL HIM WHEN SENT TO PHARMACY.

## 2020-06-23 RX ORDER — HYDROCODONE BITARTRATE AND ACETAMINOPHEN 10; 325 MG/1; MG/1
1 TABLET ORAL EVERY 4 HOURS PRN
Qty: 120 TABLET | Refills: 0 | Status: SHIPPED | OUTPATIENT
Start: 2020-06-23 | End: 2020-06-23

## 2020-06-23 RX ORDER — HYDROCODONE BITARTRATE AND ACETAMINOPHEN 10; 325 MG/1; MG/1
1 TABLET ORAL EVERY 4 HOURS PRN
Qty: 120 TABLET | Refills: 0 | Status: SHIPPED | OUTPATIENT
Start: 2020-06-23 | End: 2020-07-13

## 2020-07-08 ENCOUNTER — TELEPHONE (OUTPATIENT)
Dept: HEMATOLOGY/ONCOLOGY | Facility: HOSPITAL | Age: 55
End: 2020-07-08

## 2020-07-08 NOTE — TELEPHONE ENCOUNTER
Per Dr Johana Wong [facility] has been doing well he reports the minor pain he had previously has improved. Follow-up per Dr. Nancee Cabot recommendations. Dr. Raysa Patel states as long as he is feeling well no need to repeat imaging sooner. \"    Called patient, left

## 2020-07-09 RX ORDER — LOSARTAN POTASSIUM 50 MG/1
50 TABLET ORAL DAILY
Qty: 90 TABLET | Refills: 0 | Status: SHIPPED | OUTPATIENT
Start: 2020-07-09 | End: 2020-09-30

## 2020-07-09 NOTE — TELEPHONE ENCOUNTER
Last refill Norco: 6/23/2020, #120. Refill not due. Approve or deny:  Last Losartan refill  6/3/2019 1 year supply. Future appt: 8/14/2020.   Last OV 12/6/2019

## 2020-07-09 NOTE — TELEPHONE ENCOUNTER
HYDROcodone-acetaminophen [facility])  MG Oral Tab    May be a few days early        losartan Potassium 50 MG Oral Tab    Patient scheduled appt.

## 2020-07-13 ENCOUNTER — TELEPHONE (OUTPATIENT)
Dept: FAMILY MEDICINE CLINIC | Facility: CLINIC | Age: 55
End: 2020-07-13

## 2020-07-13 RX ORDER — HYDROCHLOROTHIAZIDE 12.5 MG/1
TABLET ORAL
Qty: 90 TABLET | Refills: 3 | Status: SHIPPED | OUTPATIENT
Start: 2020-07-13 | End: 2021-09-20

## 2020-07-13 RX ORDER — GABAPENTIN 300 MG/1
CAPSULE ORAL
Qty: 60 CAPSULE | Refills: 5 | Status: SHIPPED | OUTPATIENT
Start: 2020-07-13 | End: 2021-02-23

## 2020-07-13 RX ORDER — HYDROCODONE BITARTRATE AND ACETAMINOPHEN 10; 325 MG/1; MG/1
1 TABLET ORAL EVERY 4 HOURS PRN
Qty: 120 TABLET | Refills: 0 | Status: SHIPPED | OUTPATIENT
Start: 2020-07-13 | End: 2020-07-31

## 2020-07-31 RX ORDER — HYDROCODONE BITARTRATE AND ACETAMINOPHEN 10; 325 MG/1; MG/1
1 TABLET ORAL EVERY 4 HOURS PRN
Qty: 120 TABLET | Refills: 0 | Status: SHIPPED | OUTPATIENT
Start: 2020-07-31 | End: 2020-08-20

## 2020-08-14 ENCOUNTER — OFFICE VISIT (OUTPATIENT)
Dept: FAMILY MEDICINE CLINIC | Facility: CLINIC | Age: 55
End: 2020-08-14
Payer: COMMERCIAL

## 2020-08-14 VITALS
WEIGHT: 200 LBS | RESPIRATION RATE: 16 BRPM | SYSTOLIC BLOOD PRESSURE: 110 MMHG | OXYGEN SATURATION: 96 % | BODY MASS INDEX: 29.62 KG/M2 | DIASTOLIC BLOOD PRESSURE: 72 MMHG | HEIGHT: 69 IN | HEART RATE: 72 BPM

## 2020-08-14 DIAGNOSIS — Z12.5 ENCOUNTER FOR SCREENING FOR MALIGNANT NEOPLASM OF PROSTATE: ICD-10-CM

## 2020-08-14 DIAGNOSIS — I10 ESSENTIAL HYPERTENSION: ICD-10-CM

## 2020-08-14 DIAGNOSIS — E03.9 HYPOTHYROIDISM, UNSPECIFIED TYPE: ICD-10-CM

## 2020-08-14 DIAGNOSIS — M1A.1790 CHRONIC LEAD-INDUCED GOUT INVOLVING TOE WITHOUT TOPHUS, UNSPECIFIED LATERALITY, SUBSEQUENT ENCOUNTER: ICD-10-CM

## 2020-08-14 DIAGNOSIS — C18.1 PRIMARY APPENDICEAL ADENOCARCINOMA (HCC): Primary | ICD-10-CM

## 2020-08-14 DIAGNOSIS — E55.9 HYPOVITAMINOSIS D: ICD-10-CM

## 2020-08-14 DIAGNOSIS — E78.1 HYPERTRIGLYCERIDEMIA: ICD-10-CM

## 2020-08-14 DIAGNOSIS — T56.0X1D CHRONIC LEAD-INDUCED GOUT INVOLVING TOE WITHOUT TOPHUS, UNSPECIFIED LATERALITY, SUBSEQUENT ENCOUNTER: ICD-10-CM

## 2020-08-14 PROCEDURE — 3074F SYST BP LT 130 MM HG: CPT | Performed by: FAMILY MEDICINE

## 2020-08-14 PROCEDURE — 3078F DIAST BP <80 MM HG: CPT | Performed by: FAMILY MEDICINE

## 2020-08-14 PROCEDURE — 99213 OFFICE O/P EST LOW 20 MIN: CPT | Performed by: FAMILY MEDICINE

## 2020-08-14 PROCEDURE — 3008F BODY MASS INDEX DOCD: CPT | Performed by: FAMILY MEDICINE

## 2020-08-14 RX ORDER — ALLOPURINOL 100 MG/1
100 TABLET ORAL DAILY
Qty: 90 TABLET | Refills: 3 | Status: SHIPPED | OUTPATIENT
Start: 2020-08-14 | End: 2020-12-18

## 2020-08-14 NOTE — PROGRESS NOTES
Patient is here for follow-up's to be recalled that he is a survivor of colorectal appendiceal cancer and has been doing remarkably well. No longer smoking does not abuse alcohol and he is still gainfully employed as a .    very lo

## 2020-08-18 NOTE — TELEPHONE ENCOUNTER
Dr. Sita Donohue,  Patient requesting refill on [place].  LAST REFILL  7/31/20 #120    Please advise on refill    Medication pended

## 2020-08-18 NOTE — TELEPHONE ENCOUNTER
HYDROcodone-acetaminophen [facility])  MG Oral Tab      [id number] - [address], [phone], [phone]

## 2020-08-20 RX ORDER — HYDROCODONE BITARTRATE AND ACETAMINOPHEN 10; 325 MG/1; MG/1
1 TABLET ORAL EVERY 4 HOURS PRN
Qty: 120 TABLET | Refills: 0 | Status: SHIPPED | OUTPATIENT
Start: 2020-08-20 | End: 2020-09-09

## 2020-09-08 ENCOUNTER — TELEPHONE (OUTPATIENT)
Dept: FAMILY MEDICINE CLINIC | Facility: CLINIC | Age: 55
End: 2020-09-08

## 2020-09-08 NOTE — TELEPHONE ENCOUNTER
Pt needs to have the Hydrocodone refilled. If he should happen to have to come in and get a paper copy, then he wants to be called not his wife. He would prefer the medication to just beother sent to the pharmacy.  He wo His number is [phone] pt is a ca

## 2020-09-09 RX ORDER — HYDROCODONE BITARTRATE AND ACETAMINOPHEN 10; 325 MG/1; MG/1
1 TABLET ORAL EVERY 4 HOURS PRN
Qty: 120 TABLET | Refills: 0 | Status: SHIPPED | OUTPATIENT
Start: 2020-09-09 | End: 2020-10-02

## 2020-09-11 ENCOUNTER — APPOINTMENT (OUTPATIENT)
Dept: HEMATOLOGY/ONCOLOGY | Facility: HOSPITAL | Age: 55
End: 2020-09-11
Attending: INTERNAL MEDICINE
Payer: COMMERCIAL

## 2020-09-11 ENCOUNTER — TELEPHONE (OUTPATIENT)
Dept: HEMATOLOGY/ONCOLOGY | Facility: HOSPITAL | Age: 55
End: 2020-09-11

## 2020-09-11 NOTE — TELEPHONE ENCOUNTER
Received after hours msg from wife Kaleb Schaefer to binu Madrigal's appt for today. No reason given.  Thank you Gisell Quiroz

## 2020-09-28 RX ORDER — HYDROCODONE BITARTRATE AND ACETAMINOPHEN 10; 325 MG/1; MG/1
1 TABLET ORAL EVERY 4 HOURS PRN
Qty: 120 TABLET | Refills: 0 | Status: CANCELLED | OUTPATIENT
Start: 2020-09-28

## 2020-09-28 NOTE — TELEPHONE ENCOUNTER
losartan Potassium 50 MG Oral Tab    HYDROcodone-acetaminophen (NORCO)  MG Oral Tab    Updated patients pharmacy due to move.

## 2020-09-30 ENCOUNTER — TELEPHONE (OUTPATIENT)
Dept: FAMILY MEDICINE CLINIC | Facility: CLINIC | Age: 55
End: 2020-09-30

## 2020-09-30 RX ORDER — LOSARTAN POTASSIUM 50 MG/1
50 TABLET ORAL DAILY
Qty: 90 TABLET | Refills: 0 | Status: SHIPPED | OUTPATIENT
Start: 2020-09-30 | End: 2021-01-28

## 2020-09-30 NOTE — TELEPHONE ENCOUNTER
Patient notified, patient states this is a 20 days supply.    Dr. Lena Rockwell, please review message for refill on [place].

## 2020-09-30 NOTE — TELEPHONE ENCOUNTER
Dr. Kari sEcalera pended this med for [address] on 9/28, but pt called back this AM stating he cannot wait for refill. Are you agreeable to sending today? Please advise.  Thank you,  Vivienne DWYER

## 2020-09-30 NOTE — TELEPHONE ENCOUNTER
There were 120 Norco filled on September 9, 2020.  21 days later he should not need another refill, especially if he is taking this as needed for gout. If he is looking for another 120 tablets 21 days later, I will defer to his PCP Dr. Dale Dias.

## 2020-10-01 NOTE — TELEPHONE ENCOUNTER
Yes   I agree     But suggest office visit in 2 months    Has serious pain          Will consider alternative methodeddy soria

## 2020-10-02 RX ORDER — HYDROCODONE BITARTRATE AND ACETAMINOPHEN 10; 325 MG/1; MG/1
1 TABLET ORAL EVERY 4 HOURS PRN
Qty: 120 TABLET | Refills: 0 | Status: SHIPPED | OUTPATIENT
Start: 2020-10-02 | End: 2020-10-20

## 2020-10-12 RX ORDER — BUDESONIDE AND FORMOTEROL FUMARATE DIHYDRATE 160; 4.5 UG/1; UG/1
AEROSOL RESPIRATORY (INHALATION)
Qty: 3 INHALER | Refills: 3 | Status: SHIPPED | OUTPATIENT
Start: 2020-10-12 | End: 2021-09-20

## 2020-10-20 RX ORDER — HYDROCODONE BITARTRATE AND ACETAMINOPHEN 10; 325 MG/1; MG/1
1 TABLET ORAL EVERY 4 HOURS PRN
Qty: 120 TABLET | Refills: 0 | Status: SHIPPED | OUTPATIENT
Start: 2020-10-21 | End: 2020-11-10

## 2020-10-30 ENCOUNTER — TELEPHONE (OUTPATIENT)
Dept: HEMATOLOGY/ONCOLOGY | Facility: HOSPITAL | Age: 55
End: 2020-10-30

## 2020-10-30 NOTE — TELEPHONE ENCOUNTER
Aguila Mcginnis, his wife called, asked if you could return her call. She has a question about the scan Dr. Natalia Michelle wanted him to have. Is there an order placed for it? [phone].

## 2020-10-30 NOTE — TELEPHONE ENCOUNTER
Last Ct scan was in June 2020. F/u with Dr Onelia Kapoor as planned next week. Dr Onelia Kapoor will determine when and if patient needs a CT scan. Order will be given the day of follow up.

## 2020-11-05 NOTE — PROGRESS NOTES
Patient is here for MD f/u appendiceal cancer. Last CT scan was in June. Patient c/o LUQ abdominal discomfort on occasion. Patient has urgent and frequent stools but manageable with Imodium. Patient states he still has numbness in both feet.  Appetite is go

## 2020-11-07 ENCOUNTER — LAB ENCOUNTER (OUTPATIENT)
Dept: LAB | Facility: HOSPITAL | Age: 55
End: 2020-11-07
Attending: INTERNAL MEDICINE
Payer: COMMERCIAL

## 2020-11-07 DIAGNOSIS — I10 ESSENTIAL HYPERTENSION: ICD-10-CM

## 2020-11-07 DIAGNOSIS — C18.1 PRIMARY APPENDICEAL ADENOCARCINOMA (HCC): ICD-10-CM

## 2020-11-07 DIAGNOSIS — E78.1 HYPERTRIGLYCERIDEMIA: ICD-10-CM

## 2020-11-07 DIAGNOSIS — E53.8 LOW SERUM VITAMIN B12: ICD-10-CM

## 2020-11-07 DIAGNOSIS — E03.9 HYPOTHYROIDISM, UNSPECIFIED TYPE: ICD-10-CM

## 2020-11-07 DIAGNOSIS — T56.0X1D CHRONIC LEAD-INDUCED GOUT INVOLVING TOE WITHOUT TOPHUS, UNSPECIFIED LATERALITY, SUBSEQUENT ENCOUNTER: ICD-10-CM

## 2020-11-07 DIAGNOSIS — M1A.1790 CHRONIC LEAD-INDUCED GOUT INVOLVING TOE WITHOUT TOPHUS, UNSPECIFIED LATERALITY, SUBSEQUENT ENCOUNTER: ICD-10-CM

## 2020-11-07 DIAGNOSIS — E55.9 HYPOVITAMINOSIS D: ICD-10-CM

## 2020-11-07 DIAGNOSIS — Z12.5 ENCOUNTER FOR SCREENING FOR MALIGNANT NEOPLASM OF PROSTATE: ICD-10-CM

## 2020-11-07 PROCEDURE — 80061 LIPID PANEL: CPT

## 2020-11-07 PROCEDURE — 82607 VITAMIN B-12: CPT

## 2020-11-07 PROCEDURE — 84550 ASSAY OF BLOOD/URIC ACID: CPT

## 2020-11-07 PROCEDURE — 36415 COLL VENOUS BLD VENIPUNCTURE: CPT

## 2020-11-07 PROCEDURE — 80053 COMPREHEN METABOLIC PANEL: CPT

## 2020-11-07 PROCEDURE — 82378 CARCINOEMBRYONIC ANTIGEN: CPT

## 2020-11-07 PROCEDURE — 84443 ASSAY THYROID STIM HORMONE: CPT

## 2020-11-07 PROCEDURE — 85025 COMPLETE CBC W/AUTO DIFF WBC: CPT

## 2020-11-07 PROCEDURE — 82306 VITAMIN D 25 HYDROXY: CPT

## 2020-11-09 ENCOUNTER — DOCUMENTATION ONLY (OUTPATIENT)
Dept: HEMATOLOGY/ONCOLOGY | Facility: HOSPITAL | Age: 55
End: 2020-11-09

## 2020-11-09 NOTE — PROGRESS NOTES
Ximena Friend MD  P Edw Kate Prado Rns             He needs to resume B12 injections.  I spoke to his \"life Partner\".  She does all his scheduling - please have someone reach out to her to arrange.  Orders are in.    Continue indefinitely   Princess

## 2020-11-10 ENCOUNTER — TELEPHONE (OUTPATIENT)
Dept: FAMILY MEDICINE CLINIC | Facility: CLINIC | Age: 55
End: 2020-11-10

## 2020-11-10 RX ORDER — HYDROCODONE BITARTRATE AND ACETAMINOPHEN 10; 325 MG/1; MG/1
1 TABLET ORAL EVERY 4 HOURS PRN
Qty: 120 TABLET | Refills: 0 | Status: SHIPPED | OUTPATIENT
Start: 2020-11-10 | End: 2020-12-01

## 2020-11-10 NOTE — TELEPHONE ENCOUNTER
Patient wanted Dr. Jossue Vickers to know that he had his labs done on Saturday and wanted to make sure he reviewed results. Please advise.

## 2020-11-10 NOTE — TELEPHONE ENCOUNTER
Patient is requesting refill of HYDROcodone-acetaminophen [facility] AND [facility])  MG Oral Tab    [facility] [address] [place] [address] M[address] AT [facility] OF [address], [phone], [phone]

## 2020-11-10 NOTE — PROGRESS NOTES
[facility]    PATIENT'S NAME: Mitchell Davis   ATTENDING PHYSICIAN: Viviane Leung M.D.    PATIENT ACCOUNT #: [de-identified] LOCATION: [address] [place] [address] RECORD #: [id number] YOB: 1965   DATE OF SERVICE: 11/05/2020       CANCER C numbness in both feet; it is not worse but it is certainly not better. His appetite is good. His energy level is good. He is working full time and able to keep up with the job. We also went through a recent move.   He and his significant other have now 5.25.    IMPRESSION:    1. History of appendiceal carcinoma:  He had both mucinous and signet ring features.   He had no clear-cut evidence of disease recurrence on the last CT scan, but he had this vague area of fluid/scarring extra-luminally in the left

## 2020-11-10 NOTE — TELEPHONE ENCOUNTER
Dr. Fatoumata Samuels,  See refill request for Norco. Last refill 10/21/20. Last office visit 8/14/20    Medication pended.

## 2020-11-13 ENCOUNTER — OFFICE VISIT (OUTPATIENT)
Dept: HEMATOLOGY/ONCOLOGY | Age: 55
End: 2020-11-13
Attending: INTERNAL MEDICINE
Payer: COMMERCIAL

## 2020-11-13 DIAGNOSIS — E53.8 B12 DEFICIENCY: Primary | ICD-10-CM

## 2020-11-13 PROCEDURE — 96372 THER/PROPH/DIAG INJ SC/IM: CPT

## 2020-11-13 RX ORDER — CYANOCOBALAMIN 1000 UG/ML
1000 INJECTION INTRAMUSCULAR; SUBCUTANEOUS ONCE
Status: COMPLETED | OUTPATIENT
Start: 2020-11-13 | End: 2020-11-13

## 2020-11-13 RX ORDER — CYANOCOBALAMIN 1000 UG/ML
1000 INJECTION INTRAMUSCULAR; SUBCUTANEOUS ONCE
Status: CANCELLED | OUTPATIENT
Start: 2020-12-04

## 2020-11-13 RX ADMIN — CYANOCOBALAMIN 1000 MCG: 1000 INJECTION INTRAMUSCULAR; SUBCUTANEOUS at 15:51:00

## 2020-12-01 RX ORDER — HYDROCODONE BITARTRATE AND ACETAMINOPHEN 10; 325 MG/1; MG/1
1 TABLET ORAL EVERY 4 HOURS PRN
Qty: 120 TABLET | Refills: 0 | Status: SHIPPED | OUTPATIENT
Start: 2020-12-01 | End: 2021-01-08

## 2020-12-01 NOTE — TELEPHONE ENCOUNTER
HYDROcodone-acetaminophen (NORCO)  MG Oral Tab    [facility] #[id number] - [phone] AT [facility] OF [address], [phone], [phone]

## 2020-12-11 ENCOUNTER — OFFICE VISIT (OUTPATIENT)
Dept: HEMATOLOGY/ONCOLOGY | Age: 55
End: 2020-12-11
Attending: INTERNAL MEDICINE
Payer: COMMERCIAL

## 2020-12-11 DIAGNOSIS — E53.8 B12 DEFICIENCY: Primary | ICD-10-CM

## 2020-12-11 PROCEDURE — 96372 THER/PROPH/DIAG INJ SC/IM: CPT

## 2020-12-11 RX ORDER — CYANOCOBALAMIN 1000 UG/ML
1000 INJECTION INTRAMUSCULAR; SUBCUTANEOUS ONCE
Status: CANCELLED | OUTPATIENT
Start: 2021-01-08

## 2020-12-11 RX ORDER — CYANOCOBALAMIN 1000 UG/ML
1000 INJECTION INTRAMUSCULAR; SUBCUTANEOUS ONCE
Status: COMPLETED | OUTPATIENT
Start: 2020-12-11 | End: 2020-12-11

## 2020-12-11 RX ADMIN — CYANOCOBALAMIN 1000 MCG: 1000 INJECTION INTRAMUSCULAR; SUBCUTANEOUS at 15:13:00

## 2020-12-18 ENCOUNTER — HOSPITAL ENCOUNTER (OUTPATIENT)
Dept: CT IMAGING | Age: 55
Discharge: HOME | End: 2020-12-18
Attending: INTERNAL MEDICINE
Payer: COMMERCIAL

## 2020-12-18 ENCOUNTER — OFFICE VISIT (OUTPATIENT)
Dept: FAMILY MEDICINE CLINIC | Facility: CLINIC | Age: 55
End: 2020-12-18
Payer: COMMERCIAL

## 2020-12-18 VITALS
DIASTOLIC BLOOD PRESSURE: 78 MMHG | HEIGHT: 68 IN | BODY MASS INDEX: 31.07 KG/M2 | TEMPERATURE: 99 F | SYSTOLIC BLOOD PRESSURE: 112 MMHG | RESPIRATION RATE: 22 BRPM | HEART RATE: 66 BPM | OXYGEN SATURATION: 97 % | WEIGHT: 205 LBS

## 2020-12-18 DIAGNOSIS — C18.1 APPENDIX CARCINOMA (HCC): ICD-10-CM

## 2020-12-18 DIAGNOSIS — E78.1 HYPERTRIGLYCERIDEMIA: Primary | ICD-10-CM

## 2020-12-18 DIAGNOSIS — T56.0X1D CHRONIC LEAD-INDUCED GOUT INVOLVING TOE WITHOUT TOPHUS, UNSPECIFIED LATERALITY, SUBSEQUENT ENCOUNTER: ICD-10-CM

## 2020-12-18 DIAGNOSIS — M1A.1790 CHRONIC LEAD-INDUCED GOUT INVOLVING TOE WITHOUT TOPHUS, UNSPECIFIED LATERALITY, SUBSEQUENT ENCOUNTER: ICD-10-CM

## 2020-12-18 DIAGNOSIS — E55.9 HYPOVITAMINOSIS D: ICD-10-CM

## 2020-12-18 PROCEDURE — 3074F SYST BP LT 130 MM HG: CPT | Performed by: FAMILY MEDICINE

## 2020-12-18 PROCEDURE — 3078F DIAST BP <80 MM HG: CPT | Performed by: FAMILY MEDICINE

## 2020-12-18 PROCEDURE — 3008F BODY MASS INDEX DOCD: CPT | Performed by: FAMILY MEDICINE

## 2020-12-18 PROCEDURE — 71260 CT THORAX DX C+: CPT | Performed by: INTERNAL MEDICINE

## 2020-12-18 PROCEDURE — 82565 ASSAY OF CREATININE: CPT

## 2020-12-18 PROCEDURE — 99213 OFFICE O/P EST LOW 20 MIN: CPT | Performed by: FAMILY MEDICINE

## 2020-12-18 PROCEDURE — 74177 CT ABD & PELVIS W/CONTRAST: CPT | Performed by: INTERNAL MEDICINE

## 2020-12-18 RX ORDER — HYDROCODONE BITARTRATE AND ACETAMINOPHEN 10; 325 MG/1; MG/1
1 TABLET ORAL EVERY 4 HOURS PRN
Qty: 120 TABLET | Refills: 0 | Status: SHIPPED | OUTPATIENT
Start: 2021-01-18 | End: 2021-03-19

## 2020-12-18 RX ORDER — ALLOPURINOL 100 MG/1
100 TABLET ORAL DAILY
Qty: 90 TABLET | Refills: 3 | Status: SHIPPED | OUTPATIENT
Start: 2020-12-18 | End: 2021-09-20

## 2020-12-18 RX ORDER — HYDROCODONE BITARTRATE AND ACETAMINOPHEN 10; 325 MG/1; MG/1
1 TABLET ORAL EVERY 4 HOURS PRN
Qty: 120 TABLET | Refills: 0 | Status: SHIPPED | OUTPATIENT
Start: 2020-12-18 | End: 2021-03-19 | Stop reason: ALTCHOICE

## 2020-12-18 RX ORDER — KETOCONAZOLE 20 MG/G
1 CREAM TOPICAL 2 TIMES DAILY
Qty: 60 G | Refills: 1 | Status: SHIPPED | OUTPATIENT
Start: 2020-12-18 | End: 2021-09-20

## 2020-12-18 RX ORDER — HYDROCODONE BITARTRATE AND ACETAMINOPHEN 10; 325 MG/1; MG/1
1 TABLET ORAL EVERY 4 HOURS PRN
Qty: 120 TABLET | Refills: 0 | Status: SHIPPED | OUTPATIENT
Start: 2021-02-18 | End: 2021-03-19

## 2020-12-18 NOTE — PROGRESS NOTES
Presents for follow-up this is a survivor of appendiceal cancer doing remarkably well in today's CAT scan shows marked improvement. He will follow-up with Dr. Rodriguez Blunt tell his oncologist regarding this good news. .    As part of the CAT scan report it was not you

## 2021-01-06 ENCOUNTER — TELEPHONE (OUTPATIENT)
Dept: FAMILY MEDICINE CLINIC | Facility: CLINIC | Age: 56
End: 2021-01-06

## 2021-01-06 NOTE — TELEPHONE ENCOUNTER
Refill request for Norco:  Last OV 12/18/2020. I called the pharmacy, last  date per pharmacist 12/21/2020. Attempted to reach patient, Left message advising too soon for refill.

## 2021-01-08 ENCOUNTER — OFFICE VISIT (OUTPATIENT)
Dept: HEMATOLOGY/ONCOLOGY | Age: 56
End: 2021-01-08
Attending: INTERNAL MEDICINE
Payer: COMMERCIAL

## 2021-01-08 ENCOUNTER — TELEPHONE (OUTPATIENT)
Dept: FAMILY MEDICINE CLINIC | Facility: CLINIC | Age: 56
End: 2021-01-08

## 2021-01-08 ENCOUNTER — OFFICE VISIT (OUTPATIENT)
Dept: FAMILY MEDICINE CLINIC | Facility: CLINIC | Age: 56
End: 2021-01-08
Payer: COMMERCIAL

## 2021-01-08 VITALS
OXYGEN SATURATION: 98 % | RESPIRATION RATE: 16 BRPM | BODY MASS INDEX: 31.07 KG/M2 | DIASTOLIC BLOOD PRESSURE: 86 MMHG | WEIGHT: 205 LBS | SYSTOLIC BLOOD PRESSURE: 140 MMHG | HEIGHT: 68 IN | HEART RATE: 76 BPM

## 2021-01-08 DIAGNOSIS — G89.29 CHRONIC FOOT PAIN, UNSPECIFIED LATERALITY: ICD-10-CM

## 2021-01-08 DIAGNOSIS — M79.673 CHRONIC FOOT PAIN, UNSPECIFIED LATERALITY: ICD-10-CM

## 2021-01-08 DIAGNOSIS — R10.9 CHRONIC ABDOMINAL PAIN: Primary | ICD-10-CM

## 2021-01-08 DIAGNOSIS — G89.29 CHRONIC ABDOMINAL PAIN: Primary | ICD-10-CM

## 2021-01-08 DIAGNOSIS — E53.8 B12 DEFICIENCY: Primary | ICD-10-CM

## 2021-01-08 PROBLEM — T81.30XA ABDOMINAL WALL DEHISCENCE: Status: RESOLVED | Noted: 2018-12-24 | Resolved: 2021-01-08

## 2021-01-08 PROBLEM — C18.1 PRIMARY APPENDICEAL ADENOCARCINOMA (HCC): Status: RESOLVED | Noted: 2018-12-17 | Resolved: 2021-01-08

## 2021-01-08 PROBLEM — Z51.89 VISIT FOR WOUND CHECK: Status: RESOLVED | Noted: 2019-01-30 | Resolved: 2021-01-08

## 2021-01-08 PROBLEM — T81.321A ABDOMINAL WALL DEHISCENCE: Status: RESOLVED | Noted: 2018-12-24 | Resolved: 2021-01-08

## 2021-01-08 PROBLEM — K35.80 ACUTE APPENDICITIS, UNSPECIFIED ACUTE APPENDICITIS TYPE: Status: RESOLVED | Noted: 2018-08-29 | Resolved: 2021-01-08

## 2021-01-08 PROBLEM — C18.1: Status: RESOLVED | Noted: 2018-08-28 | Resolved: 2021-01-08

## 2021-01-08 PROCEDURE — 99214 OFFICE O/P EST MOD 30 MIN: CPT | Performed by: FAMILY MEDICINE

## 2021-01-08 PROCEDURE — 96372 THER/PROPH/DIAG INJ SC/IM: CPT

## 2021-01-08 PROCEDURE — 3077F SYST BP >= 140 MM HG: CPT | Performed by: FAMILY MEDICINE

## 2021-01-08 PROCEDURE — 3008F BODY MASS INDEX DOCD: CPT | Performed by: FAMILY MEDICINE

## 2021-01-08 PROCEDURE — 3079F DIAST BP 80-89 MM HG: CPT | Performed by: FAMILY MEDICINE

## 2021-01-08 RX ORDER — CYANOCOBALAMIN 1000 UG/ML
1000 INJECTION INTRAMUSCULAR; SUBCUTANEOUS ONCE
Status: COMPLETED | OUTPATIENT
Start: 2021-01-08 | End: 2021-01-08

## 2021-01-08 RX ORDER — HYDROCODONE BITARTRATE AND ACETAMINOPHEN 10; 325 MG/1; MG/1
1 TABLET ORAL EVERY 4 HOURS PRN
Qty: 180 TABLET | Refills: 0 | Status: SHIPPED | OUTPATIENT
Start: 2021-01-08 | End: 2021-02-05

## 2021-01-08 RX ORDER — CYANOCOBALAMIN 1000 UG/ML
1000 INJECTION INTRAMUSCULAR; SUBCUTANEOUS ONCE
Status: CANCELLED | OUTPATIENT
Start: 2021-02-05

## 2021-01-08 RX ADMIN — CYANOCOBALAMIN 1000 MCG: 1000 INJECTION INTRAMUSCULAR; SUBCUTANEOUS at 15:04:00

## 2021-01-08 NOTE — TELEPHONE ENCOUNTER
Last refill:  HYDROcodone-acetaminophen  MG Oral Tab 120 tablet 0 12/18/2020    Sig:   Take 1 tablet by mouth every 4 (four) hours as needed for Pain. 1/6/21 12:34 PM  Alta Miguel RN Note     Refill request for Norco:  Last OV 12/18/2020.   I rylan

## 2021-01-08 NOTE — TELEPHONE ENCOUNTER
Unlikely I'll adjust the medication amount already set in place, it does seem this summer was being filled roughly every 20 days, and then recently clearly documented at 30 days for 120 tablets.  Will discuss with patient in office    Reji De Dios MD

## 2021-01-08 NOTE — PROGRESS NOTES
[facility] Progress Note    SUBJECTIVE: Marleni Savage 60year old male is here today for Patient presents with:  Medication Follow-Up: pain medication      Had been getting the pain medication every 20 days establish with Dr. Marquita John.     Rm Mata recent laparotomy, repair of dehiscence   • OTHER SURGICAL HISTORY  12/11/2018    FBQLB-Ufgraajelmho-jucwqpuc resection of the terminal ileum with partial colon resection and ileocolic anastomosis, Peritonectomy, Omentectomy (greater and lesser), Cytoreduc • GABAPENTIN 300 MG Oral Cap TAKE 1 CAPSULE(300 MG) BY MOUTH TWICE DAILY 60 capsule 5   • FENOFIBRATE 160 MG Oral Tab TAKE 1 TABLET(160 MG) BY MOUTH DAILY 90 tablet 3   • LEVOTHYROXINE SODIUM 100 MCG Oral Tab TAKE 1 TABLET BY MOUTH BEFORE BREAKFAST 90 ta secondary to surgical changes and in his feet somewhat neuropathic and presumed secondary to chemotherapy. He had been receiving 120 tablets every 20 days roughly.   Through the entire time and only recently was a change to 30 days by note on Dr. Josiah Delaney

## 2021-01-08 NOTE — TELEPHONE ENCOUNTER
Pt states the refill of hydrocodone for 120 pills is only for a 20 day supply  He states he needs it now     Please call back

## 2021-01-11 DIAGNOSIS — E03.9 HYPOTHYROIDISM, UNSPECIFIED TYPE: ICD-10-CM

## 2021-01-11 RX ORDER — LEVOTHYROXINE SODIUM 0.1 MG/1
100 TABLET ORAL
Qty: 90 TABLET | Refills: 3 | Status: SHIPPED | OUTPATIENT
Start: 2021-01-11

## 2021-01-11 NOTE — TELEPHONE ENCOUNTER
Rx Request  LEVOTHYROXINE SODIUM 100 MCG Oral Tab    Disp:   90                 R: 3    Associated Dx: Hypothyroidism    Last Refilled: 12/18/2020    Last Visit: 01/08/2021

## 2021-01-28 RX ORDER — LOSARTAN POTASSIUM 50 MG/1
50 TABLET ORAL DAILY
Qty: 90 TABLET | Refills: 0 | Status: SHIPPED | OUTPATIENT
Start: 2021-01-28 | End: 2021-04-21

## 2021-02-04 DIAGNOSIS — G89.29 CHRONIC ABDOMINAL PAIN: ICD-10-CM

## 2021-02-04 DIAGNOSIS — R10.9 CHRONIC ABDOMINAL PAIN: ICD-10-CM

## 2021-02-04 DIAGNOSIS — M79.673 CHRONIC FOOT PAIN, UNSPECIFIED LATERALITY: ICD-10-CM

## 2021-02-04 DIAGNOSIS — G89.29 CHRONIC FOOT PAIN, UNSPECIFIED LATERALITY: ICD-10-CM

## 2021-02-05 ENCOUNTER — APPOINTMENT (OUTPATIENT)
Dept: HEMATOLOGY/ONCOLOGY | Age: 56
End: 2021-02-05
Attending: INTERNAL MEDICINE
Payer: COMMERCIAL

## 2021-02-05 RX ORDER — HYDROCODONE BITARTRATE AND ACETAMINOPHEN 10; 325 MG/1; MG/1
1 TABLET ORAL EVERY 4 HOURS PRN
Qty: 180 TABLET | Refills: 0 | Status: SHIPPED | OUTPATIENT
Start: 2021-02-05 | End: 2021-03-05

## 2021-02-12 ENCOUNTER — APPOINTMENT (OUTPATIENT)
Dept: HEMATOLOGY/ONCOLOGY | Age: 56
End: 2021-02-12
Attending: INTERNAL MEDICINE
Payer: COMMERCIAL

## 2021-02-17 ENCOUNTER — APPOINTMENT (OUTPATIENT)
Dept: HEMATOLOGY/ONCOLOGY | Age: 56
End: 2021-02-17
Attending: INTERNAL MEDICINE
Payer: COMMERCIAL

## 2021-02-23 RX ORDER — GABAPENTIN 300 MG/1
300 CAPSULE ORAL 2 TIMES DAILY
Qty: 60 CAPSULE | Refills: 5 | Status: SHIPPED | OUTPATIENT
Start: 2021-02-23 | End: 2022-10-14

## 2021-03-04 DIAGNOSIS — G89.29 CHRONIC FOOT PAIN, UNSPECIFIED LATERALITY: ICD-10-CM

## 2021-03-04 DIAGNOSIS — G89.29 CHRONIC ABDOMINAL PAIN: ICD-10-CM

## 2021-03-04 DIAGNOSIS — R10.9 CHRONIC ABDOMINAL PAIN: ICD-10-CM

## 2021-03-04 DIAGNOSIS — M79.673 CHRONIC FOOT PAIN, UNSPECIFIED LATERALITY: ICD-10-CM

## 2021-03-05 ENCOUNTER — APPOINTMENT (OUTPATIENT)
Dept: HEMATOLOGY/ONCOLOGY | Age: 56
End: 2021-03-05
Attending: INTERNAL MEDICINE
Payer: COMMERCIAL

## 2021-03-05 RX ORDER — HYDROCODONE BITARTRATE AND ACETAMINOPHEN 10; 325 MG/1; MG/1
1 TABLET ORAL EVERY 4 HOURS PRN
Qty: 180 TABLET | Refills: 0 | Status: SHIPPED | OUTPATIENT
Start: 2021-03-05 | End: 2021-04-02

## 2021-03-18 ENCOUNTER — LAB ENCOUNTER (OUTPATIENT)
Dept: LAB | Age: 56
End: 2021-03-18
Attending: FAMILY MEDICINE
Payer: COMMERCIAL

## 2021-03-18 DIAGNOSIS — E78.1 HYPERTRIGLYCERIDEMIA: ICD-10-CM

## 2021-03-18 DIAGNOSIS — E55.9 HYPOVITAMINOSIS D: ICD-10-CM

## 2021-03-18 LAB
ALBUMIN SERPL-MCNC: 4.3 G/DL (ref 3.4–5)
ALBUMIN/GLOB SERPL: 1.1 {RATIO} (ref 1–2)
ALP LIVER SERPL-CCNC: 51 U/L
ALT SERPL-CCNC: 42 U/L
ANION GAP SERPL CALC-SCNC: 4 MMOL/L (ref 0–18)
AST SERPL-CCNC: 21 U/L (ref 15–37)
BILIRUB SERPL-MCNC: 0.4 MG/DL (ref 0.1–2)
BUN BLD-MCNC: 17 MG/DL (ref 7–18)
BUN/CREAT SERPL: 15.9 (ref 10–20)
CALCIUM BLD-MCNC: 9.7 MG/DL (ref 8.5–10.1)
CHLORIDE SERPL-SCNC: 107 MMOL/L (ref 98–112)
CHOLEST SMN-MCNC: 191 MG/DL (ref ?–200)
CO2 SERPL-SCNC: 28 MMOL/L (ref 21–32)
CREAT BLD-MCNC: 1.07 MG/DL
GLOBULIN PLAS-MCNC: 4 G/DL (ref 2.8–4.4)
GLUCOSE BLD-MCNC: 109 MG/DL (ref 70–99)
HDLC SERPL-MCNC: 33 MG/DL (ref 40–59)
LDLC SERPL DIRECT ASSAY-MCNC: 83 MG/DL (ref ?–100)
M PROTEIN MFR SERPL ELPH: 8.3 G/DL (ref 6.4–8.2)
NONHDLC SERPL-MCNC: 158 MG/DL (ref ?–130)
OSMOLALITY SERPL CALC.SUM OF ELEC: 290 MOSM/KG (ref 275–295)
PATIENT FASTING Y/N/NP: YES
PATIENT FASTING Y/N/NP: YES
POTASSIUM SERPL-SCNC: 4.8 MMOL/L (ref 3.5–5.1)
SODIUM SERPL-SCNC: 139 MMOL/L (ref 136–145)
TRIGL SERPL-MCNC: 497 MG/DL (ref 30–149)
VIT D+METAB SERPL-MCNC: 13.5 NG/ML (ref 30–100)

## 2021-03-18 PROCEDURE — 36415 COLL VENOUS BLD VENIPUNCTURE: CPT

## 2021-03-18 PROCEDURE — 80053 COMPREHEN METABOLIC PANEL: CPT

## 2021-03-18 PROCEDURE — 83721 ASSAY OF BLOOD LIPOPROTEIN: CPT

## 2021-03-18 PROCEDURE — 82306 VITAMIN D 25 HYDROXY: CPT

## 2021-03-18 PROCEDURE — 80061 LIPID PANEL: CPT

## 2021-03-19 ENCOUNTER — OFFICE VISIT (OUTPATIENT)
Dept: FAMILY MEDICINE CLINIC | Facility: CLINIC | Age: 56
End: 2021-03-19
Payer: COMMERCIAL

## 2021-03-19 VITALS
OXYGEN SATURATION: 98 % | BODY MASS INDEX: 31.07 KG/M2 | SYSTOLIC BLOOD PRESSURE: 124 MMHG | HEIGHT: 68 IN | WEIGHT: 205 LBS | HEART RATE: 66 BPM | DIASTOLIC BLOOD PRESSURE: 76 MMHG | RESPIRATION RATE: 16 BRPM

## 2021-03-19 DIAGNOSIS — R73.09 ELEVATED GLUCOSE: ICD-10-CM

## 2021-03-19 DIAGNOSIS — G89.4 CHRONIC PAIN SYNDROME: Primary | ICD-10-CM

## 2021-03-19 DIAGNOSIS — E55.9 HYPOVITAMINOSIS D: ICD-10-CM

## 2021-03-19 DIAGNOSIS — E03.9 HYPOTHYROIDISM, UNSPECIFIED TYPE: ICD-10-CM

## 2021-03-19 PROCEDURE — 3078F DIAST BP <80 MM HG: CPT | Performed by: FAMILY MEDICINE

## 2021-03-19 PROCEDURE — 3008F BODY MASS INDEX DOCD: CPT | Performed by: FAMILY MEDICINE

## 2021-03-19 PROCEDURE — 3074F SYST BP LT 130 MM HG: CPT | Performed by: FAMILY MEDICINE

## 2021-03-19 PROCEDURE — 99214 OFFICE O/P EST MOD 30 MIN: CPT | Performed by: FAMILY MEDICINE

## 2021-03-19 RX ORDER — ERGOCALCIFEROL 1.25 MG/1
50000 CAPSULE ORAL WEEKLY
Qty: 4 CAPSULE | Refills: 0 | Status: SHIPPED | OUTPATIENT
Start: 2021-03-19 | End: 2021-04-18

## 2021-03-19 NOTE — PROGRESS NOTES
[facility] Progress Note    SUBJECTIVE: Leticia Paz 60year old male is here today for Patient presents with:  Medication Follow-Up  Lab Results      Had a rough month, with busy with weather.  Felt like fish oil helped with aches and pains laparotomy, repair of dehiscence   • OTHER SURGICAL HISTORY  12/11/2018    PDSBE-Wyjvlkrmczvu-vrewekiu resection of the terminal ileum with partial colon resection and ileocolic anastomosis, Peritonectomy, Omentectomy (greater and lesser), Cytoreductive pierce TABLET(20 MG) BY MOUTH EVERY NIGHT 90 tablet 3   • PEG 3350-KCl-Na Bicarb-NaCl (TRILYTE) 420 g Oral Recon Soln Starting at 4:00 pm the night before procedure, drink 8 ounces of the prep every 15-20 minutes until finished 1 Bottle 0   • indomethacin 50 MG O

## 2021-04-01 DIAGNOSIS — R10.9 CHRONIC ABDOMINAL PAIN: ICD-10-CM

## 2021-04-01 DIAGNOSIS — M79.673 CHRONIC FOOT PAIN, UNSPECIFIED LATERALITY: ICD-10-CM

## 2021-04-01 DIAGNOSIS — G89.29 CHRONIC FOOT PAIN, UNSPECIFIED LATERALITY: ICD-10-CM

## 2021-04-01 DIAGNOSIS — G89.29 CHRONIC ABDOMINAL PAIN: ICD-10-CM

## 2021-04-02 RX ORDER — HYDROCODONE BITARTRATE AND ACETAMINOPHEN 10; 325 MG/1; MG/1
1 TABLET ORAL EVERY 4 HOURS PRN
Qty: 180 TABLET | Refills: 0 | Status: SHIPPED | OUTPATIENT
Start: 2021-04-04 | End: 2021-04-30

## 2021-04-21 DIAGNOSIS — E78.1 HYPERTRIGLYCERIDEMIA: ICD-10-CM

## 2021-04-21 DIAGNOSIS — E88.81 METABOLIC SYNDROME: ICD-10-CM

## 2021-04-21 RX ORDER — LOSARTAN POTASSIUM 50 MG/1
50 TABLET ORAL DAILY
Qty: 90 TABLET | Refills: 3 | Status: SHIPPED | OUTPATIENT
Start: 2021-04-21

## 2021-04-21 RX ORDER — FENOFIBRATE 160 MG/1
160 TABLET ORAL DAILY
Qty: 90 TABLET | Refills: 3 | Status: SHIPPED | OUTPATIENT
Start: 2021-04-21

## 2021-04-21 RX ORDER — PRAVASTATIN SODIUM 20 MG
20 TABLET ORAL NIGHTLY
Qty: 90 TABLET | Refills: 3 | Status: SHIPPED | OUTPATIENT
Start: 2021-04-21

## 2021-04-30 DIAGNOSIS — G89.29 CHRONIC ABDOMINAL PAIN: ICD-10-CM

## 2021-04-30 DIAGNOSIS — R10.9 CHRONIC ABDOMINAL PAIN: ICD-10-CM

## 2021-04-30 DIAGNOSIS — G89.29 CHRONIC FOOT PAIN, UNSPECIFIED LATERALITY: ICD-10-CM

## 2021-04-30 DIAGNOSIS — M79.673 CHRONIC FOOT PAIN, UNSPECIFIED LATERALITY: ICD-10-CM

## 2021-04-30 RX ORDER — HYDROCODONE BITARTRATE AND ACETAMINOPHEN 10; 325 MG/1; MG/1
1 TABLET ORAL EVERY 4 HOURS PRN
Qty: 180 TABLET | Refills: 0 | Status: SHIPPED | OUTPATIENT
Start: 2021-04-30 | End: 2021-05-28

## 2021-05-28 DIAGNOSIS — G89.29 CHRONIC ABDOMINAL PAIN: ICD-10-CM

## 2021-05-28 DIAGNOSIS — M79.673 CHRONIC FOOT PAIN, UNSPECIFIED LATERALITY: ICD-10-CM

## 2021-05-28 DIAGNOSIS — R10.9 CHRONIC ABDOMINAL PAIN: ICD-10-CM

## 2021-05-28 DIAGNOSIS — G89.29 CHRONIC FOOT PAIN, UNSPECIFIED LATERALITY: ICD-10-CM

## 2021-05-28 RX ORDER — HYDROCODONE BITARTRATE AND ACETAMINOPHEN 10; 325 MG/1; MG/1
1 TABLET ORAL EVERY 4 HOURS PRN
Qty: 180 TABLET | Refills: 0 | Status: SHIPPED | OUTPATIENT
Start: 2021-05-28 | End: 2021-06-25

## 2021-05-28 NOTE — TELEPHONE ENCOUNTER
HYDROcodone-acetaminophen (NORCO)  MG Oral Tab        [facility] #[id number] - [place] [address] - [phone] AT [facility] OF [address], [phone], [phone]

## 2021-06-24 ENCOUNTER — TELEPHONE (OUTPATIENT)
Dept: FAMILY MEDICINE CLINIC | Facility: CLINIC | Age: 56
End: 2021-06-24

## 2021-06-24 DIAGNOSIS — G89.29 CHRONIC ABDOMINAL PAIN: ICD-10-CM

## 2021-06-24 DIAGNOSIS — G89.29 CHRONIC FOOT PAIN, UNSPECIFIED LATERALITY: ICD-10-CM

## 2021-06-24 DIAGNOSIS — R10.9 CHRONIC ABDOMINAL PAIN: ICD-10-CM

## 2021-06-24 DIAGNOSIS — M79.673 CHRONIC FOOT PAIN, UNSPECIFIED LATERALITY: ICD-10-CM

## 2021-06-25 RX ORDER — HYDROCODONE BITARTRATE AND ACETAMINOPHEN 10; 325 MG/1; MG/1
1 TABLET ORAL EVERY 4 HOURS PRN
Qty: 180 TABLET | Refills: 0 | Status: SHIPPED | OUTPATIENT
Start: 2021-06-25 | End: 2021-07-23

## 2021-07-19 NOTE — PROGRESS NOTES
MD f/u for appendiceal adenocarcinoma and due for C5 FOLFOX today. Reports gout symptoms; PCP sent rx for prednisone 20 mg QD x14 days and pt started on Sun. Also reports that he is off of Norco completely and managing fair.  Appetite is low right after david Orientation to room/Bed in low position, brakes on

## 2021-07-22 DIAGNOSIS — R10.9 CHRONIC ABDOMINAL PAIN: ICD-10-CM

## 2021-07-22 DIAGNOSIS — M79.673 CHRONIC FOOT PAIN, UNSPECIFIED LATERALITY: ICD-10-CM

## 2021-07-22 DIAGNOSIS — G89.29 CHRONIC ABDOMINAL PAIN: ICD-10-CM

## 2021-07-22 DIAGNOSIS — G89.29 CHRONIC FOOT PAIN, UNSPECIFIED LATERALITY: ICD-10-CM

## 2021-07-22 NOTE — TELEPHONE ENCOUNTER
Patient calling request refill of his HYDROcodone-acetaminophen (NORCO)  MG Oral Tab.      Send to [address], [address] [place] [address] [address] AT [facility] OF [address], [phone], [phone]

## 2021-07-22 NOTE — TELEPHONE ENCOUNTER
Dr. Francisco Vazquez,  Patient asking for refill of Norco.  Last refill 6/25/21  Last office visit  3/19/21    Medication pended

## 2021-07-23 RX ORDER — HYDROCODONE BITARTRATE AND ACETAMINOPHEN 10; 325 MG/1; MG/1
1 TABLET ORAL EVERY 4 HOURS PRN
Qty: 180 TABLET | Refills: 0 | Status: SHIPPED | OUTPATIENT
Start: 2021-07-23 | End: 2021-08-20

## 2021-07-23 NOTE — TELEPHONE ENCOUNTER
Recommend appointment prior to next refill I do like to see people every 3 months for management of this medication    Michelle Palomino MD

## 2021-08-20 DIAGNOSIS — R10.9 CHRONIC ABDOMINAL PAIN: ICD-10-CM

## 2021-08-20 DIAGNOSIS — G89.29 CHRONIC ABDOMINAL PAIN: ICD-10-CM

## 2021-08-20 DIAGNOSIS — G89.29 CHRONIC FOOT PAIN, UNSPECIFIED LATERALITY: ICD-10-CM

## 2021-08-20 DIAGNOSIS — M79.673 CHRONIC FOOT PAIN, UNSPECIFIED LATERALITY: ICD-10-CM

## 2021-08-20 RX ORDER — HYDROCODONE BITARTRATE AND ACETAMINOPHEN 10; 325 MG/1; MG/1
1 TABLET ORAL EVERY 4 HOURS PRN
Qty: 180 TABLET | Refills: 0 | Status: SHIPPED | OUTPATIENT
Start: 2021-08-20 | End: 2021-09-20

## 2021-08-20 NOTE — TELEPHONE ENCOUNTER
Approve/deny Frida  Last fill 7/23/21 or #180  Last OV 3/19/21  It looks like you had wanted pt to FU at last refill request and he did not. I left message on pt voicemail to please schedule a FU appt.

## 2021-09-09 ENCOUNTER — TELEPHONE (OUTPATIENT)
Dept: FAMILY MEDICINE CLINIC | Facility: CLINIC | Age: 56
End: 2021-09-09

## 2021-09-15 ENCOUNTER — LAB ENCOUNTER (OUTPATIENT)
Dept: LAB | Age: 56
End: 2021-09-15
Attending: FAMILY MEDICINE
Payer: COMMERCIAL

## 2021-09-15 DIAGNOSIS — R73.09 ELEVATED GLUCOSE: ICD-10-CM

## 2021-09-15 DIAGNOSIS — E03.9 HYPOTHYROIDISM, UNSPECIFIED TYPE: ICD-10-CM

## 2021-09-15 LAB
EST. AVERAGE GLUCOSE BLD GHB EST-MCNC: 120 MG/DL (ref 68–126)
HBA1C MFR BLD HPLC: 5.8 % (ref ?–5.7)
T4 FREE SERPL-MCNC: 0.8 NG/DL (ref 0.8–1.7)
TSI SER-ACNC: 9.23 MIU/ML (ref 0.36–3.74)

## 2021-09-15 PROCEDURE — 84443 ASSAY THYROID STIM HORMONE: CPT

## 2021-09-15 PROCEDURE — 36415 COLL VENOUS BLD VENIPUNCTURE: CPT

## 2021-09-15 PROCEDURE — 83036 HEMOGLOBIN GLYCOSYLATED A1C: CPT

## 2021-09-15 PROCEDURE — 84439 ASSAY OF FREE THYROXINE: CPT

## 2021-09-20 ENCOUNTER — OFFICE VISIT (OUTPATIENT)
Dept: FAMILY MEDICINE CLINIC | Facility: CLINIC | Age: 56
End: 2021-09-20
Payer: COMMERCIAL

## 2021-09-20 VITALS
BODY MASS INDEX: 31.07 KG/M2 | SYSTOLIC BLOOD PRESSURE: 128 MMHG | DIASTOLIC BLOOD PRESSURE: 82 MMHG | RESPIRATION RATE: 16 BRPM | HEIGHT: 68 IN | HEART RATE: 64 BPM | WEIGHT: 205 LBS | OXYGEN SATURATION: 98 %

## 2021-09-20 DIAGNOSIS — R10.9 CHRONIC ABDOMINAL PAIN: ICD-10-CM

## 2021-09-20 DIAGNOSIS — G89.29 CHRONIC ABDOMINAL PAIN: ICD-10-CM

## 2021-09-20 DIAGNOSIS — Z00.00 WELLNESS EXAMINATION: Primary | ICD-10-CM

## 2021-09-20 DIAGNOSIS — E03.9 HYPOTHYROIDISM, UNSPECIFIED TYPE: ICD-10-CM

## 2021-09-20 DIAGNOSIS — Z12.5 SCREENING FOR PROSTATE CANCER: ICD-10-CM

## 2021-09-20 DIAGNOSIS — M1A.1790 CHRONIC LEAD-INDUCED GOUT INVOLVING TOE WITHOUT TOPHUS, UNSPECIFIED LATERALITY, SUBSEQUENT ENCOUNTER: ICD-10-CM

## 2021-09-20 DIAGNOSIS — I10 ESSENTIAL HYPERTENSION: ICD-10-CM

## 2021-09-20 DIAGNOSIS — J45.30 MILD PERSISTENT ASTHMA WITHOUT COMPLICATION: ICD-10-CM

## 2021-09-20 DIAGNOSIS — G89.29 CHRONIC FOOT PAIN, UNSPECIFIED LATERALITY: ICD-10-CM

## 2021-09-20 DIAGNOSIS — M79.673 CHRONIC FOOT PAIN, UNSPECIFIED LATERALITY: ICD-10-CM

## 2021-09-20 DIAGNOSIS — T56.0X1D CHRONIC LEAD-INDUCED GOUT INVOLVING TOE WITHOUT TOPHUS, UNSPECIFIED LATERALITY, SUBSEQUENT ENCOUNTER: ICD-10-CM

## 2021-09-20 PROCEDURE — 3008F BODY MASS INDEX DOCD: CPT | Performed by: FAMILY MEDICINE

## 2021-09-20 PROCEDURE — 99396 PREV VISIT EST AGE 40-64: CPT | Performed by: FAMILY MEDICINE

## 2021-09-20 PROCEDURE — 3074F SYST BP LT 130 MM HG: CPT | Performed by: FAMILY MEDICINE

## 2021-09-20 PROCEDURE — 3079F DIAST BP 80-89 MM HG: CPT | Performed by: FAMILY MEDICINE

## 2021-09-20 RX ORDER — ALLOPURINOL 100 MG/1
100 TABLET ORAL DAILY
Qty: 90 TABLET | Refills: 3 | Status: SHIPPED | OUTPATIENT
Start: 2021-09-20

## 2021-09-20 RX ORDER — HYDROCHLOROTHIAZIDE 12.5 MG/1
12.5 TABLET ORAL DAILY
Qty: 90 TABLET | Refills: 3 | Status: SHIPPED | OUTPATIENT
Start: 2021-09-20

## 2021-09-20 RX ORDER — KETOCONAZOLE 20 MG/G
1 CREAM TOPICAL 2 TIMES DAILY
Qty: 60 G | Refills: 1 | Status: SHIPPED | OUTPATIENT
Start: 2021-09-20

## 2021-09-20 RX ORDER — ALBUTEROL SULFATE 90 UG/1
2 AEROSOL, METERED RESPIRATORY (INHALATION) EVERY 6 HOURS PRN
Qty: 2 EACH | Refills: 0 | Status: SHIPPED | OUTPATIENT
Start: 2021-09-20

## 2021-09-20 RX ORDER — BUDESONIDE AND FORMOTEROL FUMARATE DIHYDRATE 160; 4.5 UG/1; UG/1
2 AEROSOL RESPIRATORY (INHALATION) 2 TIMES DAILY
Qty: 3 EACH | Refills: 3 | Status: SHIPPED | OUTPATIENT
Start: 2021-09-20

## 2021-09-20 RX ORDER — HYDROCODONE BITARTRATE AND ACETAMINOPHEN 10; 325 MG/1; MG/1
1 TABLET ORAL EVERY 4 HOURS PRN
Qty: 180 TABLET | Refills: 0 | Status: SHIPPED | OUTPATIENT
Start: 2021-09-20 | End: 2021-10-19

## 2021-09-20 NOTE — PROGRESS NOTES
HPI:   Gunner Gomez is a 60year old male who presents for an Annual Health Visit. Mom just passed, was in hospice was [de-identified]years old. Discussed a1c, mildly elevated. Has probably missed doses over the last month, due to the above.     Has drink 8 ounces of the prep every 15-20 minutes until finished 1 Bottle 0   • indomethacin 50 MG Oral Cap TAKE 1 CAPSULE BY MOUTH EVERY 6 HOURS AS NEEDED FOR GOUT PAIN 120 capsule 3   • Neomycin-Polymyxin-HC (CORTISPORIN) 1 % Otic Solution Apply 3 Drops in Family History   Problem Relation Age of Onset   • Lipids Maternal Grandfather       SOCIAL HISTORY   Social History    Tobacco Use      Smoking status: Former Smoker        Packs/day: 1.00        Years: 25.00        Pack years: 25        Types: Cigarett auscultation bilaterally  Chest wall: no tenderness  Abdomen: soft, non-tender; bowel sounds normal; no masses,  no organomegaly  : deferred  Back: symmetric, no curvature. ROM normal. No CVA tenderness.   Extremities: extremities normal, atraumatic, no c with Dr. Judy Jerez for the above. Recheck TSH in 4-6 weeks to see if just elevated due to missed doses. PSA screen at the same time. There are no Patient Instructions on file for this visit.     The patient indicates understanding of these issues and a

## 2021-10-18 DIAGNOSIS — R10.9 CHRONIC ABDOMINAL PAIN: ICD-10-CM

## 2021-10-18 DIAGNOSIS — G89.29 CHRONIC ABDOMINAL PAIN: ICD-10-CM

## 2021-10-18 DIAGNOSIS — G89.29 CHRONIC FOOT PAIN, UNSPECIFIED LATERALITY: ICD-10-CM

## 2021-10-18 DIAGNOSIS — M79.673 CHRONIC FOOT PAIN, UNSPECIFIED LATERALITY: ICD-10-CM

## 2021-10-18 NOTE — TELEPHONE ENCOUNTER
HYDROcodone-acetaminophen (NORCO)  MG Oral Tab    [facility] #[id number] - [place] - [phone] AT [facility] OF [address], [phone], [phone]

## 2021-10-19 RX ORDER — HYDROCODONE BITARTRATE AND ACETAMINOPHEN 10; 325 MG/1; MG/1
1 TABLET ORAL EVERY 4 HOURS PRN
Qty: 180 TABLET | Refills: 0 | Status: SHIPPED | OUTPATIENT
Start: 2021-10-19 | End: 2021-11-15

## 2021-11-15 DIAGNOSIS — M79.673 CHRONIC FOOT PAIN, UNSPECIFIED LATERALITY: ICD-10-CM

## 2021-11-15 DIAGNOSIS — G89.29 CHRONIC FOOT PAIN, UNSPECIFIED LATERALITY: ICD-10-CM

## 2021-11-15 DIAGNOSIS — R10.9 CHRONIC ABDOMINAL PAIN: ICD-10-CM

## 2021-11-15 DIAGNOSIS — G89.29 CHRONIC ABDOMINAL PAIN: ICD-10-CM

## 2021-11-15 RX ORDER — HYDROCODONE BITARTRATE AND ACETAMINOPHEN 10; 325 MG/1; MG/1
1 TABLET ORAL EVERY 4 HOURS PRN
Qty: 180 TABLET | Refills: 0 | Status: SHIPPED | OUTPATIENT
Start: 2021-11-15 | End: 2021-12-13

## 2021-12-13 DIAGNOSIS — R10.9 CHRONIC ABDOMINAL PAIN: ICD-10-CM

## 2021-12-13 DIAGNOSIS — M79.673 CHRONIC FOOT PAIN, UNSPECIFIED LATERALITY: ICD-10-CM

## 2021-12-13 DIAGNOSIS — G89.29 CHRONIC FOOT PAIN, UNSPECIFIED LATERALITY: ICD-10-CM

## 2021-12-13 DIAGNOSIS — G89.29 CHRONIC ABDOMINAL PAIN: ICD-10-CM

## 2021-12-13 RX ORDER — HYDROCODONE BITARTRATE AND ACETAMINOPHEN 10; 325 MG/1; MG/1
1 TABLET ORAL EVERY 4 HOURS PRN
Qty: 180 TABLET | Refills: 0 | Status: SHIPPED | OUTPATIENT
Start: 2021-12-13 | End: 2022-01-10

## 2022-01-10 DIAGNOSIS — G89.29 CHRONIC FOOT PAIN, UNSPECIFIED LATERALITY: ICD-10-CM

## 2022-01-10 DIAGNOSIS — M79.673 CHRONIC FOOT PAIN, UNSPECIFIED LATERALITY: ICD-10-CM

## 2022-01-10 DIAGNOSIS — G89.29 CHRONIC ABDOMINAL PAIN: ICD-10-CM

## 2022-01-10 DIAGNOSIS — R10.9 CHRONIC ABDOMINAL PAIN: ICD-10-CM

## 2022-01-10 RX ORDER — HYDROCODONE BITARTRATE AND ACETAMINOPHEN 10; 325 MG/1; MG/1
1 TABLET ORAL EVERY 4 HOURS PRN
Qty: 180 TABLET | Refills: 0 | Status: SHIPPED | OUTPATIENT
Start: 2022-01-10

## 2022-01-10 NOTE — TELEPHONE ENCOUNTER
Patient requesting refill of HYDROcodone-acetaminophen (NORCO)  MG Oral Tab.     Send script to [address], [address] [place] [address] [address] [facility] AT [facility] OF [address], [phone], [phone]

## 2022-02-08 RX ORDER — HYDROCODONE BITARTRATE AND ACETAMINOPHEN 10; 325 MG/1; MG/1
1 TABLET ORAL EVERY 4 HOURS PRN
Qty: 180 TABLET | Refills: 0 | Status: SHIPPED | OUTPATIENT
Start: 2022-02-08 | End: 2022-03-09

## 2022-03-09 RX ORDER — HYDROCODONE BITARTRATE AND ACETAMINOPHEN 10; 325 MG/1; MG/1
1 TABLET ORAL EVERY 4 HOURS PRN
Qty: 180 TABLET | Refills: 0 | Status: SHIPPED | OUTPATIENT
Start: 2022-03-09

## 2022-04-06 RX ORDER — LEVOTHYROXINE SODIUM 0.1 MG/1
TABLET ORAL
Qty: 90 TABLET | Refills: 3 | Status: SHIPPED | OUTPATIENT
Start: 2022-04-06

## 2022-04-06 NOTE — TELEPHONE ENCOUNTER
Last ov  9/20/2021    Last refill 1/11/2021    .   Thyroid:    Lab Results   Component Value Date    TSH 9.230 (H) 09/15/2021    TSH 5.250 (H) 11/07/2020    TSH 1.810 11/04/2019    T4F 0.8 09/15/2021    T4F 0.9 11/04/2019    T4F 0.8 10/25/2019

## 2022-04-07 RX ORDER — HYDROCODONE BITARTRATE AND ACETAMINOPHEN 10; 325 MG/1; MG/1
1 TABLET ORAL EVERY 4 HOURS PRN
Qty: 180 TABLET | Refills: 0 | Status: SHIPPED | OUTPATIENT
Start: 2022-04-07

## 2022-04-07 NOTE — TELEPHONE ENCOUNTER
With controlled substances I do like to see people every 3 months, so overdue, recommend appt prior to next fill    Olivia Ron MD

## 2022-05-05 DIAGNOSIS — G89.29 CHRONIC FOOT PAIN, UNSPECIFIED LATERALITY: ICD-10-CM

## 2022-05-05 DIAGNOSIS — G89.29 CHRONIC ABDOMINAL PAIN: ICD-10-CM

## 2022-05-05 DIAGNOSIS — M79.673 CHRONIC FOOT PAIN, UNSPECIFIED LATERALITY: ICD-10-CM

## 2022-05-05 DIAGNOSIS — R10.9 CHRONIC ABDOMINAL PAIN: ICD-10-CM

## 2022-05-06 RX ORDER — HYDROCODONE BITARTRATE AND ACETAMINOPHEN 10; 325 MG/1; MG/1
1 TABLET ORAL EVERY 4 HOURS PRN
Qty: 180 TABLET | Refills: 0 | Status: SHIPPED | OUTPATIENT
Start: 2022-05-06 | End: 2022-06-03

## 2022-05-06 NOTE — TELEPHONE ENCOUNTER
I have been filling this as I see appointments scheduled, but they end up being canceled, this is the last script without have an appointment completed.     Zhanna Mcclain MD

## 2022-05-09 ENCOUNTER — LAB ENCOUNTER (OUTPATIENT)
Dept: LAB | Age: 57
End: 2022-05-09
Attending: FAMILY MEDICINE
Payer: COMMERCIAL

## 2022-05-09 DIAGNOSIS — Z12.5 SCREENING FOR PROSTATE CANCER: ICD-10-CM

## 2022-05-09 DIAGNOSIS — E03.9 HYPOTHYROIDISM, UNSPECIFIED TYPE: ICD-10-CM

## 2022-05-09 LAB
COMPLEXED PSA SERPL-MCNC: 0.97 NG/ML (ref ?–4)
T4 FREE SERPL-MCNC: 0.8 NG/DL (ref 0.8–1.7)
TSI SER-ACNC: 8.28 MIU/ML (ref 0.36–3.74)

## 2022-05-09 PROCEDURE — 84443 ASSAY THYROID STIM HORMONE: CPT

## 2022-05-09 PROCEDURE — 36415 COLL VENOUS BLD VENIPUNCTURE: CPT

## 2022-05-09 PROCEDURE — 84439 ASSAY OF FREE THYROXINE: CPT

## 2022-05-16 ENCOUNTER — OFFICE VISIT (OUTPATIENT)
Dept: FAMILY MEDICINE CLINIC | Facility: CLINIC | Age: 57
End: 2022-05-16
Payer: COMMERCIAL

## 2022-05-16 VITALS
BODY MASS INDEX: 30.77 KG/M2 | RESPIRATION RATE: 16 BRPM | OXYGEN SATURATION: 98 % | WEIGHT: 203 LBS | SYSTOLIC BLOOD PRESSURE: 152 MMHG | DIASTOLIC BLOOD PRESSURE: 82 MMHG | HEIGHT: 68 IN | HEART RATE: 86 BPM

## 2022-05-16 DIAGNOSIS — S46.912A STRAIN OF LEFT SHOULDER, INITIAL ENCOUNTER: ICD-10-CM

## 2022-05-16 DIAGNOSIS — S20.212A CONTUSION OF RIB ON LEFT SIDE, INITIAL ENCOUNTER: Primary | ICD-10-CM

## 2022-05-16 DIAGNOSIS — E78.1 HYPERTRIGLYCERIDEMIA: ICD-10-CM

## 2022-05-16 DIAGNOSIS — E03.9 HYPOTHYROIDISM, UNSPECIFIED TYPE: ICD-10-CM

## 2022-05-16 DIAGNOSIS — Z13.0 SCREENING FOR DEFICIENCY ANEMIA: ICD-10-CM

## 2022-05-16 DIAGNOSIS — I10 ESSENTIAL HYPERTENSION: ICD-10-CM

## 2022-05-16 PROCEDURE — 3079F DIAST BP 80-89 MM HG: CPT | Performed by: FAMILY MEDICINE

## 2022-05-16 PROCEDURE — 3077F SYST BP >= 140 MM HG: CPT | Performed by: FAMILY MEDICINE

## 2022-05-16 PROCEDURE — 99214 OFFICE O/P EST MOD 30 MIN: CPT | Performed by: FAMILY MEDICINE

## 2022-05-16 PROCEDURE — 3008F BODY MASS INDEX DOCD: CPT | Performed by: FAMILY MEDICINE

## 2022-05-16 RX ORDER — LEVOTHYROXINE SODIUM 0.12 MG/1
125 TABLET ORAL
Qty: 30 TABLET | Refills: 2 | Status: SHIPPED | OUTPATIENT
Start: 2022-05-16

## 2022-05-16 RX ORDER — METHYLPREDNISOLONE 4 MG/1
TABLET ORAL
Qty: 21 TABLET | Refills: 0 | Status: SHIPPED | OUTPATIENT
Start: 2022-05-16

## 2022-06-03 DIAGNOSIS — M79.673 CHRONIC FOOT PAIN, UNSPECIFIED LATERALITY: ICD-10-CM

## 2022-06-03 DIAGNOSIS — G89.29 CHRONIC FOOT PAIN, UNSPECIFIED LATERALITY: ICD-10-CM

## 2022-06-03 DIAGNOSIS — G89.29 CHRONIC ABDOMINAL PAIN: ICD-10-CM

## 2022-06-03 DIAGNOSIS — R10.9 CHRONIC ABDOMINAL PAIN: ICD-10-CM

## 2022-06-03 RX ORDER — HYDROCODONE BITARTRATE AND ACETAMINOPHEN 10; 325 MG/1; MG/1
1 TABLET ORAL EVERY 4 HOURS PRN
Qty: 180 TABLET | Refills: 0 | Status: SHIPPED | OUTPATIENT
Start: 2022-06-03

## 2022-06-03 NOTE — TELEPHONE ENCOUNTER
HYDROcodone-acetaminophen (NORCO)  MG Oral Tab    [facility] #[id number] - [place] [facility] [phone] AT [facility] OF [address], [phone], [phone]

## 2022-07-01 DIAGNOSIS — G89.29 CHRONIC FOOT PAIN, UNSPECIFIED LATERALITY: ICD-10-CM

## 2022-07-01 DIAGNOSIS — M79.673 CHRONIC FOOT PAIN, UNSPECIFIED LATERALITY: ICD-10-CM

## 2022-07-01 DIAGNOSIS — R10.9 CHRONIC ABDOMINAL PAIN: ICD-10-CM

## 2022-07-01 DIAGNOSIS — G89.29 CHRONIC ABDOMINAL PAIN: ICD-10-CM

## 2022-07-01 RX ORDER — HYDROCODONE BITARTRATE AND ACETAMINOPHEN 10; 325 MG/1; MG/1
1 TABLET ORAL EVERY 4 HOURS PRN
Qty: 180 TABLET | Refills: 0 | Status: SHIPPED | OUTPATIENT
Start: 2022-07-01

## 2022-07-01 NOTE — TELEPHONE ENCOUNTER
HYDROcodone-acetaminophen (NORCO)  MG Oral Tab      [facility] #[id number] - [address] - [phone] AT [facility] OF [address], [phone], [phone]

## 2022-07-29 ENCOUNTER — TELEPHONE (OUTPATIENT)
Dept: FAMILY MEDICINE CLINIC | Facility: CLINIC | Age: 57
End: 2022-07-29

## 2022-07-29 DIAGNOSIS — G89.29 CHRONIC ABDOMINAL PAIN: ICD-10-CM

## 2022-07-29 DIAGNOSIS — R10.9 CHRONIC ABDOMINAL PAIN: ICD-10-CM

## 2022-07-29 DIAGNOSIS — M79.673 CHRONIC FOOT PAIN, UNSPECIFIED LATERALITY: ICD-10-CM

## 2022-07-29 DIAGNOSIS — G89.29 CHRONIC FOOT PAIN, UNSPECIFIED LATERALITY: ICD-10-CM

## 2022-07-29 RX ORDER — HYDROCODONE BITARTRATE AND ACETAMINOPHEN 10; 325 MG/1; MG/1
1 TABLET ORAL EVERY 4 HOURS PRN
Qty: 180 TABLET | Refills: 0 | Status: SHIPPED | OUTPATIENT
Start: 2022-07-29 | End: 2022-08-24

## 2022-08-09 NOTE — PLAN OF CARE
Impaired Functional Mobility    • Achieve highest/safest level of mobility/gait Adequate for Discharge        Patient/Family Goals    • Patient/Family Long Term Goal Adequate for Discharge    • Patient/Family Short Term Goal Adequate for Discharge        S 04-May-2021

## 2022-08-24 DIAGNOSIS — G89.29 CHRONIC FOOT PAIN, UNSPECIFIED LATERALITY: ICD-10-CM

## 2022-08-24 DIAGNOSIS — G89.29 CHRONIC ABDOMINAL PAIN: ICD-10-CM

## 2022-08-24 DIAGNOSIS — R10.9 CHRONIC ABDOMINAL PAIN: ICD-10-CM

## 2022-08-24 DIAGNOSIS — M79.673 CHRONIC FOOT PAIN, UNSPECIFIED LATERALITY: ICD-10-CM

## 2022-08-24 RX ORDER — HYDROCODONE BITARTRATE AND ACETAMINOPHEN 10; 325 MG/1; MG/1
1 TABLET ORAL EVERY 4 HOURS PRN
Qty: 180 TABLET | Refills: 0 | Status: SHIPPED | OUTPATIENT
Start: 2022-08-24

## 2022-08-24 NOTE — TELEPHONE ENCOUNTER
[facility]one to [facility] [address] compatible with the test strips and lancets.      2020  He is here for follow up diabetes management. He is still not testing his blood sugar. I did confirm with the pharmacy that he is taking . 75 mg once weekly. Pt states he has made a lot of changes in his diet eating mostly non starchy veggies. However today he has a mini cake and frosty.      20      Diet: Healthy processed foods  Exercise: None  BS testing: Refuses to test blood sugars at home due to fear  Issues: Is complaining of symptoms of hypoglycemia frequently during sleeping and midday. 10/06/20   At previous visit we stopped amaryl due to hypoglycemia but was refilled  by pcp   Diet:high salt diet   Exercise: none   BS testing: none  Issues: denies     High cholesterol-  Takes crestor and denies any adverse effects with its use.  Watches diet and exercise.      Hypertension-  Takes lisinopril and lopressor and denies any adverse effects with their use. Watches diet and exercise. Denies any chest pain, dizziness or edema.  Follows with cardiology:Yes.     Obesity- Working on weight loss.       Past Medical History:   Diagnosis Date    Aortic stenosis     Aortic valve replacement 2019    Degenerative disc disease, lumbar     Diabetes mellitus (HCC)     PCP Dr. Karuna James Diverticulosis of sigmoid colon     GERD (gastroesophageal reflux disease)     Hyperlipidemia     Hypertension     Kidney stone     Mitral regurgitation     Obesity      Family History   Problem Relation Age of Onset    Cancer Father         prostate    Alzheimer's Disease Father     Heart Disease Neg Hx     Diabetes Neg Hx     Glaucoma Neg Hx     Macular Degen Neg Hx      Social History     Tobacco Use    Smoking status: Former Smoker     Packs/day: 1.50     Years: 40.00     Pack years: 60.00     Types: Cigarettes     Last attempt to quit:      Years since quittin.7    Smokeless tobacco: Never Used   Substance Use Topics    Alcohol use: No    Drug use: No     Allergies   Allergen Reactions    Invokana [Canagliflozin]      dizziness    Januvia [Sitagliptin]      dizziness       MEDICATIONS:  Current Outpatient Medications   Medication Sig Dispense Refill    lisinopril-hydroCHLOROthiazide (PRINZIDE;ZESTORETIC) 10-12.5 MG per tablet Take 1 tablet by mouth daily 90 tablet 1    metFORMIN (GLUCOPHAGE) 500 MG tablet TAKE 2 TABLETS BY MOUTH TWICE DAILY WITH MEALS 360 tablet 0    blood glucose monitor strips by Other route daily Test 1 times a day & as needed for symptoms of irregular blood glucose. 50 strip 3    Blood Glucose Monitoring Suppl KIT Meter as covered by insurance also test strips and lancets. 1 kit 3    Dulaglutide 1.5 MG/0.5ML SOPN Inject 1.5 mg into the skin once a week 4 pen 3    tamsulosin (FLOMAX) 0.4 MG capsule Take 1 capsule by mouth daily 90 capsule 5    Lancets MISC 1 each by Does not apply route daily 50 each 3    glimepiride (AMARYL) 4 MG tablet TAKE 1 TABLET BY MOUTH EVERY MORNING BEFORE BREAKFAST 90 tablet 1    warfarin (COUMADIN) 4 MG tablet TK 1 T PO D 90 tablet 1    omeprazole (PRILOSEC) 20 MG delayed release capsule TAKE 1 CAPSULE BY MOUTH DAILY 90 capsule 1    Elastic Bandages & Supports (JOBST KNEE HIGH COMPRESSION SM) MISC 1 each by Does not apply route daily as needed (leg swelling/CHF) 2 each 1    amiodarone (CORDARONE) 200 MG tablet Take 1 tablet by mouth daily 90 tablet 3    metoprolol tartrate (LOPRESSOR) 25 MG tablet TAKE 1 TABLET BY MOUTH TWICE DAILY 180 tablet 3    rosuvastatin (CRESTOR) 20 MG tablet TAKE 1 TABLET BY MOUTH DAILY. 90 tablet 3    Blood Pressure Monitoring (BLOOD PRESSURE CUFF) MISC 1 Device by Does not apply route daily 1 each 0    blood glucose monitor strips Test 1 times a day & as needed for symptoms of irregular blood glucose.  100 strip 3    aspirin 81 MG tablet Take 1 tablet by mouth daily Pt.will stop 10-30 -18 AM for OR (Patient taking differently: Take 81 mg by mouth daily ) 30 tablet 3    Multiple Vitamins-Minerals (THERAPEUTIC MULTIVITAMIN-MINERALS) tablet Take 1 tablet by mouth daily (with breakfast) 30 tablet 3     No current facility-administered medications for this visit. Review ofSymptoms:  Review of Systems   Constitutional: Negative for unexpected weight change. Eyes: Negative for visual disturbance. Respiratory: Negative. Negative for shortness of breath. Cardiovascular: Negative for chest pain and leg swelling. Gastrointestinal: Negative for abdominal pain and diarrhea. Endocrine: Negative for polydipsia, polyphagia and polyuria. Genitourinary: Negative. Musculoskeletal: Negative. Skin: Negative for rash and wound. Neurological: Positive for tremors. Negative for dizziness, seizures and headaches. Psychiatric/Behavioral: Negative. Negative for confusion and decreased concentration. Theremainder of a complete 14-point review of systems is negative. Vital Signs: /86   Resp 16   Wt 216 lb (98 kg)   BMI 37.08 kg/m²      Wt Readings from Last 3 Encounters:   10/06/20 216 lb (98 kg)   07/21/20 221 lb (100.2 kg)   06/08/20 219 lb 6.4 oz (99.5 kg)     Body mass index is 37.08 kg/m².   LABS:  Hemoglobin A1C   Date Value Ref Range Status   10/06/2020 6.4 % Final   06/08/2020 6.0 % Final     Lab Results   Component Value Date    LABMICR 28 (H) 01/28/2020     Lab Results   Component Value Date     (L) 12/16/2019    K 4.8 12/16/2019    CL 95 (L) 12/16/2019    CO2 19 (L) 12/16/2019    BUN 12 12/16/2019    CREATININE 1.03 12/16/2019    GLUCOSE 247 02/04/2020    CALCIUM 10.0 12/16/2019    PROT 7.1 10/26/2018    LABALBU 4.2 10/26/2018    BILITOT 0.42 10/26/2018    ALKPHOS 47 10/26/2018    AST 29 10/26/2018    ALT 25 10/26/2018    LABGLOM >60 12/16/2019    GFRAA >60 12/16/2019     Lab Results   Component Value Date    CHOL 126 12/16/2019    CHOL 179 05/21/2019    CHOL 337 (H) 02/15/2019     Lab Results   Component Value Date to treat. Encouraged 150 minutes of physical activity per week. Follow a low carbohydrate diet consuming 45 grams of carbohydrates at breakfast, lunch and dinner with two separate snacks [id number] grams of carbohydrates. Encouraged at least 7 hours of sleep. The patient was informed of the goals of diabetes management. This can only be accomplished by watching their diet and exercise levels. We certainly use medicines to help attain these goals. The consequences of not controlling blood sugars were discussed. These include blindness, heart disease, stroke, kidney disease, and possibly need for dialysis. They were told to be careful with their foot care as diabetics often have nerve damage, infections and risk for limb amutations . They also need a dilated eye exam yearly. We discussed the issues of diet, exercise, medication, complication avoidance, reviewed the signs and symptoms of diabetes, hypoglycemic episodes, significance of HbA1C.       3. Mixed hyperlipidemia  stable, lipid panel reviewed, continue current medications. Diet and exercise      4. Essential hypertension   stable, continue current medications. Diet and exercise Seek emergent care if chest pain develops. 5. BMI 37.0-37.9, adult  6. Class 2 severe obesity due to excess calories with serious comorbidity and body mass index (BMI) of 37.0 to 37.9 in adult [facility])  Reduce calories and increase physical activity to achieve a slow and steady weight loss to improve blood pressure, cholesterol and diabetes. Answered all patient questions. Agrees to follow plan of care and to follow up in 3 months, sooner if needed. Call office if unexplained blood sugars less than 70 occur or above 400. Call office or access MyChart with any further questions or concerns. Be sure to bring glucometer/food log at next appointment. Patient was seen with total face to face time of 30 minutes.  More than 50%  of this visit was counseling and education regarding his diabetes.     Electronically signed by PAIGE French CNP on 10/6/2020 at 2:08 PM      (Please note that portions of this note were completed with a voice-recognition program. Efforts were made to edit the dictation but occasionally words are mis-transcribed.)

## 2022-08-30 DIAGNOSIS — E88.81 METABOLIC SYNDROME: ICD-10-CM

## 2022-08-30 DIAGNOSIS — E78.1 HYPERTRIGLYCERIDEMIA: ICD-10-CM

## 2022-08-30 RX ORDER — FENOFIBRATE 160 MG/1
160 TABLET ORAL DAILY
Qty: 90 TABLET | Refills: 3 | Status: SHIPPED | OUTPATIENT
Start: 2022-08-30

## 2022-09-02 ENCOUNTER — TELEPHONE (OUTPATIENT)
Dept: FAMILY MEDICINE CLINIC | Facility: CLINIC | Age: 57
End: 2022-09-02

## 2022-09-02 RX ORDER — LOSARTAN POTASSIUM 50 MG/1
50 TABLET ORAL DAILY
Qty: 90 TABLET | Refills: 1 | Status: SHIPPED | OUTPATIENT
Start: 2022-09-02

## 2022-09-02 NOTE — TELEPHONE ENCOUNTER
Dr. Ghulam Proctor,  This was regarding Losartan. Last refilled April 2021 for 1 year. Patient had wellness exam 9/20/21. Shows hydrochlorothiazide for hypertension. Is patient to be taking Losartan and hydrochlorothiazide?

## 2022-09-02 NOTE — TELEPHONE ENCOUNTER
Dr. Josseline Coronel sent fenofibrate to pharmacy - pharmacy told pt their system tells them he is no longer a pt of Dr. Jackson Dos Santos.   Pt said this is incorrect and he's asking for nurse to call pharmacy as they will not give him the script

## 2022-09-22 DIAGNOSIS — R10.9 CHRONIC ABDOMINAL PAIN: ICD-10-CM

## 2022-09-22 DIAGNOSIS — M79.673 CHRONIC FOOT PAIN, UNSPECIFIED LATERALITY: ICD-10-CM

## 2022-09-22 DIAGNOSIS — G89.29 CHRONIC ABDOMINAL PAIN: ICD-10-CM

## 2022-09-22 DIAGNOSIS — J45.30 MILD PERSISTENT ASTHMA WITHOUT COMPLICATION: ICD-10-CM

## 2022-09-22 DIAGNOSIS — G89.29 CHRONIC FOOT PAIN, UNSPECIFIED LATERALITY: ICD-10-CM

## 2022-09-22 RX ORDER — HYDROCODONE BITARTRATE AND ACETAMINOPHEN 10; 325 MG/1; MG/1
1 TABLET ORAL EVERY 4 HOURS PRN
Qty: 180 TABLET | Refills: 0 | Status: SHIPPED | OUTPATIENT
Start: 2022-09-22

## 2022-09-22 RX ORDER — ALBUTEROL SULFATE 90 UG/1
2 AEROSOL, METERED RESPIRATORY (INHALATION) EVERY 6 HOURS PRN
Qty: 1 EACH | Refills: 0 | Status: SHIPPED | OUTPATIENT
Start: 2022-09-22

## 2022-09-22 RX ORDER — BUDESONIDE AND FORMOTEROL FUMARATE DIHYDRATE 160; 4.5 UG/1; UG/1
2 AEROSOL RESPIRATORY (INHALATION) 2 TIMES DAILY
Qty: 3 EACH | Refills: 0 | Status: SHIPPED | OUTPATIENT
Start: 2022-09-22

## 2022-09-22 NOTE — TELEPHONE ENCOUNTER
Please refill hydrocodone   Albuterol  Budesonide-formoterol fumarate inhaler   [facility] in [place]

## 2022-10-03 DIAGNOSIS — E88.81 METABOLIC SYNDROME: ICD-10-CM

## 2022-10-03 DIAGNOSIS — E78.1 HYPERTRIGLYCERIDEMIA: ICD-10-CM

## 2022-10-03 RX ORDER — PRAVASTATIN SODIUM 20 MG
20 TABLET ORAL NIGHTLY
Qty: 90 TABLET | Refills: 0 | Status: SHIPPED | OUTPATIENT
Start: 2022-10-03

## 2022-10-13 ENCOUNTER — LAB ENCOUNTER (OUTPATIENT)
Dept: LAB | Age: 57
End: 2022-10-13
Attending: FAMILY MEDICINE
Payer: COMMERCIAL

## 2022-10-13 DIAGNOSIS — E03.9 HYPOTHYROIDISM, UNSPECIFIED TYPE: ICD-10-CM

## 2022-10-13 DIAGNOSIS — E78.1 HYPERTRIGLYCERIDEMIA: ICD-10-CM

## 2022-10-13 DIAGNOSIS — R73.09 ELEVATED GLUCOSE: ICD-10-CM

## 2022-10-13 DIAGNOSIS — Z13.0 SCREENING FOR DEFICIENCY ANEMIA: ICD-10-CM

## 2022-10-13 LAB
ALBUMIN SERPL-MCNC: 4.2 G/DL (ref 3.4–5)
ALBUMIN/GLOB SERPL: 1 {RATIO} (ref 1–2)
ALP LIVER SERPL-CCNC: 74 U/L
ALT SERPL-CCNC: 38 U/L
ANION GAP SERPL CALC-SCNC: 4 MMOL/L (ref 0–18)
AST SERPL-CCNC: 26 U/L (ref 15–37)
BASOPHILS # BLD AUTO: 0.07 X10(3) UL (ref 0–0.2)
BASOPHILS NFR BLD AUTO: 1 %
BILIRUB SERPL-MCNC: 0.3 MG/DL (ref 0.1–2)
BUN BLD-MCNC: 20 MG/DL (ref 7–18)
CALCIUM BLD-MCNC: 9.8 MG/DL (ref 8.5–10.1)
CHLORIDE SERPL-SCNC: 106 MMOL/L (ref 98–112)
CHOLEST SERPL-MCNC: 166 MG/DL (ref ?–200)
CO2 SERPL-SCNC: 27 MMOL/L (ref 21–32)
CREAT BLD-MCNC: 1.19 MG/DL
EOSINOPHIL # BLD AUTO: 0.24 X10(3) UL (ref 0–0.7)
EOSINOPHIL NFR BLD AUTO: 3.3 %
ERYTHROCYTE [DISTWIDTH] IN BLOOD BY AUTOMATED COUNT: 14.6 %
FASTING PATIENT LIPID ANSWER: YES
FASTING STATUS PATIENT QL REPORTED: YES
GFR SERPLBLD BASED ON 1.73 SQ M-ARVRAT: 70 ML/MIN/1.73M2 (ref 60–?)
GLOBULIN PLAS-MCNC: 4.3 G/DL (ref 2.8–4.4)
GLUCOSE BLD-MCNC: 124 MG/DL (ref 70–99)
HCT VFR BLD AUTO: 45.8 %
HDLC SERPL-MCNC: 26 MG/DL (ref 40–59)
HGB BLD-MCNC: 14.9 G/DL
IMM GRANULOCYTES # BLD AUTO: 0.03 X10(3) UL (ref 0–1)
IMM GRANULOCYTES NFR BLD: 0.4 %
LDLC SERPL CALC-MCNC: 41 MG/DL (ref ?–100)
LYMPHOCYTES # BLD AUTO: 2.18 X10(3) UL (ref 1–4)
LYMPHOCYTES NFR BLD AUTO: 30 %
MCH RBC QN AUTO: 29.9 PG (ref 26–34)
MCHC RBC AUTO-ENTMCNC: 32.5 G/DL (ref 31–37)
MCV RBC AUTO: 91.8 FL
MONOCYTES # BLD AUTO: 0.55 X10(3) UL (ref 0.1–1)
MONOCYTES NFR BLD AUTO: 7.6 %
NEUTROPHILS # BLD AUTO: 4.19 X10 (3) UL (ref 1.5–7.7)
NEUTROPHILS # BLD AUTO: 4.19 X10(3) UL (ref 1.5–7.7)
NEUTROPHILS NFR BLD AUTO: 57.7 %
NONHDLC SERPL-MCNC: 140 MG/DL (ref ?–130)
OSMOLALITY SERPL CALC.SUM OF ELEC: 288 MOSM/KG (ref 275–295)
PLATELET # BLD AUTO: 206 10(3)UL (ref 150–450)
POTASSIUM SERPL-SCNC: 4.2 MMOL/L (ref 3.5–5.1)
PROT SERPL-MCNC: 8.5 G/DL (ref 6.4–8.2)
RBC # BLD AUTO: 4.99 X10(6)UL
SODIUM SERPL-SCNC: 137 MMOL/L (ref 136–145)
T4 FREE SERPL-MCNC: 0.9 NG/DL (ref 0.8–1.7)
TRIGL SERPL-MCNC: 707 MG/DL (ref 30–149)
TSI SER-ACNC: 3.27 MIU/ML (ref 0.36–3.74)
VLDLC SERPL CALC-MCNC: 98 MG/DL (ref 0–30)
WBC # BLD AUTO: 7.3 X10(3) UL (ref 4–11)

## 2022-10-13 PROCEDURE — 84439 ASSAY OF FREE THYROXINE: CPT

## 2022-10-13 PROCEDURE — 36415 COLL VENOUS BLD VENIPUNCTURE: CPT

## 2022-10-13 PROCEDURE — 80061 LIPID PANEL: CPT

## 2022-10-13 PROCEDURE — 85025 COMPLETE CBC W/AUTO DIFF WBC: CPT

## 2022-10-13 PROCEDURE — 83036 HEMOGLOBIN GLYCOSYLATED A1C: CPT

## 2022-10-13 PROCEDURE — 80053 COMPREHEN METABOLIC PANEL: CPT

## 2022-10-13 PROCEDURE — 84443 ASSAY THYROID STIM HORMONE: CPT

## 2022-10-14 ENCOUNTER — OFFICE VISIT (OUTPATIENT)
Dept: FAMILY MEDICINE CLINIC | Facility: CLINIC | Age: 57
End: 2022-10-14
Payer: COMMERCIAL

## 2022-10-14 VITALS
HEIGHT: 68 IN | WEIGHT: 202 LBS | HEART RATE: 86 BPM | RESPIRATION RATE: 16 BRPM | DIASTOLIC BLOOD PRESSURE: 84 MMHG | OXYGEN SATURATION: 98 % | BODY MASS INDEX: 30.62 KG/M2 | SYSTOLIC BLOOD PRESSURE: 130 MMHG

## 2022-10-14 DIAGNOSIS — Z12.12 SCREENING FOR COLORECTAL CANCER: ICD-10-CM

## 2022-10-14 DIAGNOSIS — Z00.00 WELLNESS EXAMINATION: Primary | ICD-10-CM

## 2022-10-14 DIAGNOSIS — E03.9 HYPOTHYROIDISM, UNSPECIFIED TYPE: ICD-10-CM

## 2022-10-14 DIAGNOSIS — Z12.11 SCREENING FOR COLORECTAL CANCER: ICD-10-CM

## 2022-10-14 DIAGNOSIS — R73.09 ELEVATED GLUCOSE: ICD-10-CM

## 2022-10-14 DIAGNOSIS — I10 ESSENTIAL HYPERTENSION: ICD-10-CM

## 2022-10-14 DIAGNOSIS — T56.0X1D CHRONIC LEAD-INDUCED GOUT INVOLVING TOE WITHOUT TOPHUS, UNSPECIFIED LATERALITY, SUBSEQUENT ENCOUNTER: ICD-10-CM

## 2022-10-14 DIAGNOSIS — M1A.1790 CHRONIC LEAD-INDUCED GOUT INVOLVING TOE WITHOUT TOPHUS, UNSPECIFIED LATERALITY, SUBSEQUENT ENCOUNTER: ICD-10-CM

## 2022-10-14 LAB
EST. AVERAGE GLUCOSE BLD GHB EST-MCNC: 120 MG/DL (ref 68–126)
HBA1C MFR BLD: 5.8 % (ref ?–5.7)

## 2022-10-14 PROCEDURE — 3075F SYST BP GE 130 - 139MM HG: CPT | Performed by: FAMILY MEDICINE

## 2022-10-14 PROCEDURE — 3079F DIAST BP 80-89 MM HG: CPT | Performed by: FAMILY MEDICINE

## 2022-10-14 PROCEDURE — 90471 IMMUNIZATION ADMIN: CPT | Performed by: FAMILY MEDICINE

## 2022-10-14 PROCEDURE — 99396 PREV VISIT EST AGE 40-64: CPT | Performed by: FAMILY MEDICINE

## 2022-10-14 PROCEDURE — 90686 IIV4 VACC NO PRSV 0.5 ML IM: CPT | Performed by: FAMILY MEDICINE

## 2022-10-14 PROCEDURE — 3008F BODY MASS INDEX DOCD: CPT | Performed by: FAMILY MEDICINE

## 2022-10-14 RX ORDER — ALLOPURINOL 100 MG/1
100 TABLET ORAL DAILY
Qty: 90 TABLET | Refills: 3 | Status: SHIPPED | OUTPATIENT
Start: 2022-10-14

## 2022-10-14 RX ORDER — HYDROCHLOROTHIAZIDE 12.5 MG/1
12.5 TABLET ORAL DAILY
Qty: 90 TABLET | Refills: 3 | Status: SHIPPED | OUTPATIENT
Start: 2022-10-14

## 2022-10-14 RX ORDER — LEVOTHYROXINE SODIUM 0.12 MG/1
125 TABLET ORAL
Qty: 90 TABLET | Refills: 2 | Status: SHIPPED | OUTPATIENT
Start: 2022-10-14

## 2022-10-20 DIAGNOSIS — G89.29 CHRONIC ABDOMINAL PAIN: ICD-10-CM

## 2022-10-20 DIAGNOSIS — M79.673 CHRONIC FOOT PAIN, UNSPECIFIED LATERALITY: ICD-10-CM

## 2022-10-20 DIAGNOSIS — R10.9 CHRONIC ABDOMINAL PAIN: ICD-10-CM

## 2022-10-20 DIAGNOSIS — G89.29 CHRONIC FOOT PAIN, UNSPECIFIED LATERALITY: ICD-10-CM

## 2022-10-21 RX ORDER — HYDROCODONE BITARTRATE AND ACETAMINOPHEN 10; 325 MG/1; MG/1
1 TABLET ORAL EVERY 4 HOURS PRN
Qty: 180 TABLET | Refills: 0 | Status: SHIPPED | OUTPATIENT
Start: 2022-10-21

## 2022-11-17 DIAGNOSIS — M79.673 CHRONIC FOOT PAIN, UNSPECIFIED LATERALITY: ICD-10-CM

## 2022-11-17 DIAGNOSIS — R10.9 CHRONIC ABDOMINAL PAIN: ICD-10-CM

## 2022-11-17 DIAGNOSIS — G89.29 CHRONIC ABDOMINAL PAIN: ICD-10-CM

## 2022-11-17 DIAGNOSIS — G89.29 CHRONIC FOOT PAIN, UNSPECIFIED LATERALITY: ICD-10-CM

## 2022-11-17 NOTE — TELEPHONE ENCOUNTER
Dr. Audra Newby,  See refill request    Last refill 10/21/22  Last office visit 10/14/22  Medication pended

## 2022-11-17 NOTE — TELEPHONE ENCOUNTER
HYDROcodone-acetaminophen ([facility])  MG Oral Tab      Please send to this pharmacy:    [facility] [facility], [address] [facility] [address], [phone], [address]   This pharmacy in chart

## 2022-11-18 RX ORDER — HYDROCODONE BITARTRATE AND ACETAMINOPHEN 10; 325 MG/1; MG/1
1 TABLET ORAL EVERY 4 HOURS PRN
Qty: 180 TABLET | Refills: 0 | Status: SHIPPED | OUTPATIENT
Start: 2022-11-18

## 2022-12-16 DIAGNOSIS — M79.673 CHRONIC FOOT PAIN, UNSPECIFIED LATERALITY: ICD-10-CM

## 2022-12-16 DIAGNOSIS — G89.29 CHRONIC FOOT PAIN, UNSPECIFIED LATERALITY: ICD-10-CM

## 2022-12-16 DIAGNOSIS — R10.9 CHRONIC ABDOMINAL PAIN: ICD-10-CM

## 2022-12-16 DIAGNOSIS — G89.29 CHRONIC ABDOMINAL PAIN: ICD-10-CM

## 2022-12-16 RX ORDER — HYDROCODONE BITARTRATE AND ACETAMINOPHEN 10; 325 MG/1; MG/1
1 TABLET ORAL EVERY 4 HOURS PRN
Qty: 180 TABLET | Refills: 0 | Status: SHIPPED | OUTPATIENT
Start: 2022-12-16 | End: 2022-12-16

## 2023-01-13 DIAGNOSIS — R10.9 CHRONIC ABDOMINAL PAIN: ICD-10-CM

## 2023-01-13 DIAGNOSIS — M79.673 CHRONIC FOOT PAIN, UNSPECIFIED LATERALITY: ICD-10-CM

## 2023-01-13 DIAGNOSIS — G89.29 CHRONIC FOOT PAIN, UNSPECIFIED LATERALITY: ICD-10-CM

## 2023-01-13 DIAGNOSIS — G89.29 CHRONIC ABDOMINAL PAIN: ICD-10-CM

## 2023-01-13 RX ORDER — HYDROCODONE BITARTRATE AND ACETAMINOPHEN 10; 325 MG/1; MG/1
1 TABLET ORAL EVERY 4 HOURS PRN
Qty: 180 TABLET | Refills: 0 | Status: SHIPPED | OUTPATIENT
Start: 2023-01-13

## 2023-02-10 DIAGNOSIS — M79.673 CHRONIC FOOT PAIN, UNSPECIFIED LATERALITY: ICD-10-CM

## 2023-02-10 DIAGNOSIS — R10.9 CHRONIC ABDOMINAL PAIN: ICD-10-CM

## 2023-02-10 DIAGNOSIS — G89.29 CHRONIC ABDOMINAL PAIN: ICD-10-CM

## 2023-02-10 DIAGNOSIS — G89.29 CHRONIC FOOT PAIN, UNSPECIFIED LATERALITY: ICD-10-CM

## 2023-02-10 RX ORDER — HYDROCODONE BITARTRATE AND ACETAMINOPHEN 10; 325 MG/1; MG/1
1 TABLET ORAL EVERY 4 HOURS PRN
Qty: 180 TABLET | Refills: 0 | Status: SHIPPED | OUTPATIENT
Start: 2023-02-10

## 2023-03-10 DIAGNOSIS — M79.673 CHRONIC FOOT PAIN, UNSPECIFIED LATERALITY: ICD-10-CM

## 2023-03-10 DIAGNOSIS — G89.29 CHRONIC ABDOMINAL PAIN: ICD-10-CM

## 2023-03-10 DIAGNOSIS — R10.9 CHRONIC ABDOMINAL PAIN: ICD-10-CM

## 2023-03-10 DIAGNOSIS — G89.29 CHRONIC FOOT PAIN, UNSPECIFIED LATERALITY: ICD-10-CM

## 2023-03-10 RX ORDER — HYDROCODONE BITARTRATE AND ACETAMINOPHEN 10; 325 MG/1; MG/1
1 TABLET ORAL EVERY 4 HOURS PRN
Qty: 180 TABLET | Refills: 0 | Status: SHIPPED | OUTPATIENT
Start: 2023-03-10

## 2023-03-10 NOTE — TELEPHONE ENCOUNTER
Patient is needing refill of HYDROcodone-acetaminophen ([facility])  MG Oral Tab    Send to [address], [address] [facility], [phone], [phone]

## 2023-03-16 ENCOUNTER — TELEPHONE (OUTPATIENT)
Dept: FAMILY MEDICINE CLINIC | Facility: CLINIC | Age: 58
End: 2023-03-16

## 2023-03-16 DIAGNOSIS — I10 ESSENTIAL HYPERTENSION: ICD-10-CM

## 2023-03-16 DIAGNOSIS — E03.9 HYPOTHYROIDISM, UNSPECIFIED TYPE: ICD-10-CM

## 2023-03-16 DIAGNOSIS — E88.81 METABOLIC SYNDROME: ICD-10-CM

## 2023-03-16 DIAGNOSIS — E78.1 HYPERTRIGLYCERIDEMIA: ICD-10-CM

## 2023-03-16 DIAGNOSIS — T56.0X1D CHRONIC LEAD-INDUCED GOUT INVOLVING TOE WITHOUT TOPHUS, UNSPECIFIED LATERALITY, SUBSEQUENT ENCOUNTER: ICD-10-CM

## 2023-03-16 DIAGNOSIS — M1A.1790 CHRONIC LEAD-INDUCED GOUT INVOLVING TOE WITHOUT TOPHUS, UNSPECIFIED LATERALITY, SUBSEQUENT ENCOUNTER: ICD-10-CM

## 2023-03-16 RX ORDER — LOSARTAN POTASSIUM 50 MG/1
50 TABLET ORAL DAILY
Qty: 90 TABLET | Refills: 1 | Status: SHIPPED | OUTPATIENT
Start: 2023-03-16

## 2023-03-16 RX ORDER — LEVOTHYROXINE SODIUM 0.12 MG/1
125 TABLET ORAL
Qty: 90 TABLET | Refills: 1 | Status: SHIPPED | OUTPATIENT
Start: 2023-03-16

## 2023-03-16 RX ORDER — ALLOPURINOL 100 MG/1
100 TABLET ORAL DAILY
Qty: 90 TABLET | Refills: 1 | Status: SHIPPED | OUTPATIENT
Start: 2023-03-16

## 2023-03-16 RX ORDER — FENOFIBRATE 160 MG/1
160 TABLET ORAL DAILY
Qty: 90 TABLET | Refills: 1 | Status: SHIPPED | OUTPATIENT
Start: 2023-03-16

## 2023-03-16 RX ORDER — HYDROCHLOROTHIAZIDE 12.5 MG/1
12.5 TABLET ORAL DAILY
Qty: 90 TABLET | Refills: 1 | Status: SHIPPED | OUTPATIENT
Start: 2023-03-16

## 2023-03-16 RX ORDER — PRAVASTATIN SODIUM 20 MG
20 TABLET ORAL NIGHTLY
Qty: 90 TABLET | Refills: 1 | Status: SHIPPED | OUTPATIENT
Start: 2023-03-16

## 2023-03-16 NOTE — TELEPHONE ENCOUNTER
Wife said they requested refills from pharmacy on 3-11. Wife was advised we did not receive request.  Pt is now out of some of his meds.   pls fill:    losartan 50 MG   levothyroxine 125 MCG  pravastatin 20 MG  hydroCHLOROthiazide 12.5 MG     To [facility] in [place] [address]

## 2023-03-16 NOTE — TELEPHONE ENCOUNTER
Called and spoke to life partner Archie Norwood - patient asking for refills - would like medication refills done at the same time to track more efficiently. Refills given. Mrs. Booker is a 85 y/o female with a PMHx of HTN, COPD, and hypothyroidism presents to the ED for evaluation of frequent falls x 1 week, and b/l lower extremity weakness. Patient reports she fell thursday (11/19), friday (11/20) and again monday (11/23) and tuesday (11/24). During the most recent fall, she reports she fell on her left side and has left hip and knee pain. . pt reports she trips frequently, but is unsure of how she fell this week. pt reports hitting the left side of her head x 2. pt reports she walks without any assistance and live with her daughter. She currently denies headache, dizziness, neck pain, back pain, visual changes, chest pain, sob, abdominal pain, n/v/d/c, urinary symptoms, blood in the stool, fever, chills, cough, palpitations, hx of seizures, illicit drug use, or use of alcohol. Patient is not on any anticoagulation.     In the ED, patient was hemodynamically stable, labs were within normal limits. Trauma work out was negative. Patient does have grossly positive UTI. Patient is currently admitted for evaluation of frequent falls. Mrs. Booker is a 85 y/o female with a PMHx of HTN, COPD, vitamin d deficiency, blepharospasm and hypothyroidism presents to the ED for evaluation of frequent falls x 1 week, and b/l lower extremity weakness. Patient reports she fell thursday (11/19), friday (11/20) and again monday (11/23) and tuesday (11/24). During the most recent fall, she reports she fell on her left side and has left hip and knee pain. patient reports she trips frequently, but is unsure of how she fell this week. pt reports hitting the left side of her head x 2. pt reports she walks without any assistance and live with her daughter. She currently denies headache, dizziness, neck pain, back pain, visual changes, chest pain, sob, abdominal pain, n/v/d/c, urinary symptoms, blood in the stool, fever, chills, cough, palpitations, hx of seizures, illicit drug use, or use of alcohol. Patient is not on any anticoagulation.     In the ED, patient was hemodynamically stable, labs were within normal limits. Trauma workup (ct head, ct cervical spine/neck, xray hip, knee and pelvis) was negative. Patient does have grossly positive UTI and started on rocephin 1 gm and given 1 L of LR. Patient is currently admitted for evaluation of frequent falls. Mrs. Booker is a 85 y/o female with a PMHx of HTN, COPD, vitamin d deficiency, blepharospasm and hypothyroidism presents to the ED for evaluation of frequent falls x 1 week and b/l lower extremity weakness. Patient reports she fell thursday (11/19), friday (11/20) and again monday (11/23) and tuesday (11/24). During the most recent fall, she reports she fell on her left side and hit the left side of her head and has left hip and knee pain. Patient reports she trips frequently, but is unsure of how she fell this week and rep (she is currently AAO x 2). Shereports she walks without any assistance and live with her daughter. She currently denies headache, dizziness, neck pain, back pain, visual changes, chest pain, sob, abdominal pain, n/v/d/c, urinary symptoms, blood in the stool, fever, chills, cough, palpitations, hx of seizures, illicit drug use, or use of alcohol. Patient is not on any anticoagulation.     In the ED, patient was hemodynamically stable, labs were within normal limits except for creatinine of 1.8. Trauma workup (ct head, ct cervical spine/neck, xray hip, knee and pelvis) was negative. Patient was positive for UTI and started on rocephin 1 gm and also given 1 L bolus of LR as well as tylenlol. Patient is currently admitted for evaluation of frequent falls. Mrs. Booker is a 85 y/o female with a PMHx of HTN, COPD, vitamin d deficiency, blepharospasm and hypothyroidism presents to the ED for evaluation of frequent falls x 1 week and b/l lower extremity weakness. Patient reports she fell thursday (11/19), friday (11/20) and again monday (11/23) and tuesday (11/24). During the most recent fall, she reports she fell on her left side and hit the left side of her head and has left hip and knee pain. Patient reports she can walk without any assistance and lives with her daughter however she has been falling frequently but unsure how (she is currently AAO x 2). She currently denies headache, dizziness, neck pain, back pain, visual changes, chest pain, sob, abdominal pain, n/v/d/c, urinary symptoms, blood in the stool, fever, chills, cough, palpitations, hx of seizures, illicit drug use, or use of alcohol. Patient is not on any anticoagulation.     In the ED, patient was hemodynamically stable, labs were within normal limits except for creatinine of 1.8. Trauma workup (ct head, ct cervical spine/neck, xray hip/knee/pelvis) was negative. Patient was positive for UTI and started on Rocephin 1 gm and also given 1 L bolus of LR as well as Tylenol Patient is currently admitted for evaluation of frequent falls.

## 2023-04-07 DIAGNOSIS — G89.29 CHRONIC ABDOMINAL PAIN: ICD-10-CM

## 2023-04-07 DIAGNOSIS — R10.9 CHRONIC ABDOMINAL PAIN: ICD-10-CM

## 2023-04-07 DIAGNOSIS — G89.29 CHRONIC FOOT PAIN, UNSPECIFIED LATERALITY: ICD-10-CM

## 2023-04-07 DIAGNOSIS — M79.673 CHRONIC FOOT PAIN, UNSPECIFIED LATERALITY: ICD-10-CM

## 2023-04-07 RX ORDER — HYDROCODONE BITARTRATE AND ACETAMINOPHEN 10; 325 MG/1; MG/1
1 TABLET ORAL EVERY 4 HOURS PRN
Qty: 180 TABLET | Refills: 0 | Status: SHIPPED | OUTPATIENT
Start: 2023-04-07

## 2023-04-07 NOTE — TELEPHONE ENCOUNTER
HYDROcodone-acetaminophen (NORCO)  MG Oral Tab 180 tablet 0 3/10/2023     Sig - Route: Take 1 tablet by mouth every 4 (four) hours as needed for Pain (Take 1 every 4 hours as needed for pain).  - Oral    Sent to pharmacy as: HYDROcodone-Acetaminophen  MG Oral Tablet      Future appt: 5/15/23  Last OV: 10/14/22  Approve/deny:

## 2023-04-24 DIAGNOSIS — J45.30 MILD PERSISTENT ASTHMA WITHOUT COMPLICATION: ICD-10-CM

## 2023-04-24 RX ORDER — ALBUTEROL SULFATE 90 UG/1
2 AEROSOL, METERED RESPIRATORY (INHALATION) EVERY 6 HOURS PRN
Qty: 1 EACH | Refills: 0 | Status: SHIPPED | OUTPATIENT
Start: 2023-04-24

## 2023-05-05 DIAGNOSIS — R10.9 CHRONIC ABDOMINAL PAIN: ICD-10-CM

## 2023-05-05 DIAGNOSIS — G89.29 CHRONIC ABDOMINAL PAIN: ICD-10-CM

## 2023-05-05 DIAGNOSIS — G89.29 CHRONIC FOOT PAIN, UNSPECIFIED LATERALITY: ICD-10-CM

## 2023-05-05 DIAGNOSIS — M79.673 CHRONIC FOOT PAIN, UNSPECIFIED LATERALITY: ICD-10-CM

## 2023-05-05 RX ORDER — HYDROCODONE BITARTRATE AND ACETAMINOPHEN 10; 325 MG/1; MG/1
1 TABLET ORAL EVERY 4 HOURS PRN
Qty: 180 TABLET | Refills: 0 | Status: SHIPPED | OUTPATIENT
Start: 2023-05-05

## 2023-05-08 ENCOUNTER — PATIENT MESSAGE (OUTPATIENT)
Dept: FAMILY MEDICINE CLINIC | Facility: CLINIC | Age: 58
End: 2023-05-08

## 2023-05-08 DIAGNOSIS — R73.09 ELEVATED GLUCOSE: ICD-10-CM

## 2023-05-08 DIAGNOSIS — E03.9 HYPOTHYROIDISM, UNSPECIFIED TYPE: ICD-10-CM

## 2023-05-08 DIAGNOSIS — E78.1 HYPERTRIGLYCERIDEMIA: ICD-10-CM

## 2023-05-08 DIAGNOSIS — Z00.00 LABORATORY EXAM ORDERED AS PART OF ROUTINE GENERAL MEDICAL EXAMINATION: Primary | ICD-10-CM

## 2023-05-08 DIAGNOSIS — Z12.5 SCREENING FOR PROSTATE CANCER: ICD-10-CM

## 2023-05-08 NOTE — TELEPHONE ENCOUNTER
From: Deni Virgen  To: Clare Figueroa MD  Sent: 5/8/2023 9:34 AM CDT  Subject: Upcoming physical appointment    Hi, I have my regular check up scheduled for Monday 5/15. Can you please put in an order for my annual bloodwork so that can go this week and you have the results at my appointment? Then we can discuss everything and change up any medications if needed vs me going for bloodwork after the appointment? I would greatly appreciate it!   Thank you,  Kavon Muller

## 2023-05-12 ENCOUNTER — LAB ENCOUNTER (OUTPATIENT)
Dept: LAB | Age: 58
End: 2023-05-12
Attending: FAMILY MEDICINE
Payer: COMMERCIAL

## 2023-05-12 DIAGNOSIS — E03.9 HYPOTHYROIDISM, UNSPECIFIED TYPE: ICD-10-CM

## 2023-05-12 DIAGNOSIS — Z00.00 LABORATORY EXAM ORDERED AS PART OF ROUTINE GENERAL MEDICAL EXAMINATION: ICD-10-CM

## 2023-05-12 DIAGNOSIS — E78.1 HYPERTRIGLYCERIDEMIA: ICD-10-CM

## 2023-05-12 DIAGNOSIS — Z12.5 SCREENING FOR PROSTATE CANCER: ICD-10-CM

## 2023-05-12 DIAGNOSIS — R73.09 ELEVATED GLUCOSE: ICD-10-CM

## 2023-05-12 LAB
ALBUMIN SERPL-MCNC: 4.3 G/DL (ref 3.4–5)
ALBUMIN/GLOB SERPL: 1.1 {RATIO} (ref 1–2)
ALP LIVER SERPL-CCNC: 58 U/L
ALT SERPL-CCNC: 33 U/L
ANION GAP SERPL CALC-SCNC: 9 MMOL/L (ref 0–18)
AST SERPL-CCNC: 28 U/L (ref 15–37)
BASOPHILS # BLD AUTO: 0.08 X10(3) UL (ref 0–0.2)
BASOPHILS NFR BLD AUTO: 1 %
BILIRUB SERPL-MCNC: 0.4 MG/DL (ref 0.1–2)
BUN BLD-MCNC: 20 MG/DL (ref 7–18)
CALCIUM BLD-MCNC: 10.1 MG/DL (ref 8.5–10.1)
CHLORIDE SERPL-SCNC: 105 MMOL/L (ref 98–112)
CHOLEST SERPL-MCNC: 153 MG/DL (ref ?–200)
CO2 SERPL-SCNC: 22 MMOL/L (ref 21–32)
COMPLEXED PSA SERPL-MCNC: 1.44 NG/ML (ref ?–4)
CREAT BLD-MCNC: 1.15 MG/DL
EOSINOPHIL # BLD AUTO: 0.21 X10(3) UL (ref 0–0.7)
EOSINOPHIL NFR BLD AUTO: 2.7 %
ERYTHROCYTE [DISTWIDTH] IN BLOOD BY AUTOMATED COUNT: 15.3 %
EST. AVERAGE GLUCOSE BLD GHB EST-MCNC: 108 MG/DL (ref 68–126)
FASTING PATIENT LIPID ANSWER: YES
FASTING STATUS PATIENT QL REPORTED: YES
GFR SERPLBLD BASED ON 1.73 SQ M-ARVRAT: 73 ML/MIN/1.73M2 (ref 60–?)
GLOBULIN PLAS-MCNC: 3.8 G/DL (ref 2.8–4.4)
GLUCOSE BLD-MCNC: 108 MG/DL (ref 70–99)
HBA1C MFR BLD: 5.4 % (ref ?–5.7)
HCT VFR BLD AUTO: 53.4 %
HDLC SERPL-MCNC: 27 MG/DL (ref 40–59)
HGB BLD-MCNC: 17.4 G/DL
IMM GRANULOCYTES # BLD AUTO: 0.03 X10(3) UL (ref 0–1)
IMM GRANULOCYTES NFR BLD: 0.4 %
LDLC SERPL CALC-MCNC: 33 MG/DL (ref ?–100)
LYMPHOCYTES # BLD AUTO: 1.77 X10(3) UL (ref 1–4)
LYMPHOCYTES NFR BLD AUTO: 23 %
MCH RBC QN AUTO: 29.3 PG (ref 26–34)
MCHC RBC AUTO-ENTMCNC: 32.6 G/DL (ref 31–37)
MCV RBC AUTO: 90.1 FL
MONOCYTES # BLD AUTO: 0.57 X10(3) UL (ref 0.1–1)
MONOCYTES NFR BLD AUTO: 7.4 %
NEUTROPHILS # BLD AUTO: 5.05 X10 (3) UL (ref 1.5–7.7)
NEUTROPHILS # BLD AUTO: 5.05 X10(3) UL (ref 1.5–7.7)
NEUTROPHILS NFR BLD AUTO: 65.5 %
NONHDLC SERPL-MCNC: 126 MG/DL (ref ?–130)
OSMOLALITY SERPL CALC.SUM OF ELEC: 285 MOSM/KG (ref 275–295)
PLATELET # BLD AUTO: 217 10(3)UL (ref 150–450)
POTASSIUM SERPL-SCNC: 4.2 MMOL/L (ref 3.5–5.1)
PROT SERPL-MCNC: 8.1 G/DL (ref 6.4–8.2)
RBC # BLD AUTO: 5.93 X10(6)UL
SODIUM SERPL-SCNC: 136 MMOL/L (ref 136–145)
T4 FREE SERPL-MCNC: 0.8 NG/DL (ref 0.8–1.7)
TRIGL SERPL-MCNC: 688 MG/DL (ref 30–149)
TSI SER-ACNC: 1.84 MIU/ML (ref 0.36–3.74)
VLDLC SERPL CALC-MCNC: 91 MG/DL (ref 0–30)
WBC # BLD AUTO: 7.7 X10(3) UL (ref 4–11)

## 2023-05-12 PROCEDURE — 80053 COMPREHEN METABOLIC PANEL: CPT

## 2023-05-12 PROCEDURE — 80061 LIPID PANEL: CPT

## 2023-05-12 PROCEDURE — 36415 COLL VENOUS BLD VENIPUNCTURE: CPT

## 2023-05-12 PROCEDURE — 83036 HEMOGLOBIN GLYCOSYLATED A1C: CPT

## 2023-05-12 PROCEDURE — 84439 ASSAY OF FREE THYROXINE: CPT

## 2023-05-12 PROCEDURE — 84443 ASSAY THYROID STIM HORMONE: CPT

## 2023-05-12 PROCEDURE — 85025 COMPLETE CBC W/AUTO DIFF WBC: CPT

## 2023-05-15 ENCOUNTER — OFFICE VISIT (OUTPATIENT)
Dept: FAMILY MEDICINE CLINIC | Facility: CLINIC | Age: 58
End: 2023-05-15
Payer: COMMERCIAL

## 2023-05-15 VITALS
DIASTOLIC BLOOD PRESSURE: 68 MMHG | WEIGHT: 216 LBS | HEART RATE: 58 BPM | OXYGEN SATURATION: 99 % | BODY MASS INDEX: 32.74 KG/M2 | RESPIRATION RATE: 16 BRPM | SYSTOLIC BLOOD PRESSURE: 108 MMHG | HEIGHT: 68 IN

## 2023-05-15 DIAGNOSIS — J45.30 MILD PERSISTENT ASTHMA WITHOUT COMPLICATION: ICD-10-CM

## 2023-05-15 DIAGNOSIS — Z00.00 WELLNESS EXAMINATION: Primary | ICD-10-CM

## 2023-05-15 PROCEDURE — 3078F DIAST BP <80 MM HG: CPT | Performed by: FAMILY MEDICINE

## 2023-05-15 PROCEDURE — 3008F BODY MASS INDEX DOCD: CPT | Performed by: FAMILY MEDICINE

## 2023-05-15 PROCEDURE — 3074F SYST BP LT 130 MM HG: CPT | Performed by: FAMILY MEDICINE

## 2023-05-15 PROCEDURE — 99396 PREV VISIT EST AGE 40-64: CPT | Performed by: FAMILY MEDICINE

## 2023-06-02 DIAGNOSIS — G89.29 CHRONIC ABDOMINAL PAIN: ICD-10-CM

## 2023-06-02 DIAGNOSIS — R10.9 CHRONIC ABDOMINAL PAIN: ICD-10-CM

## 2023-06-02 DIAGNOSIS — G89.29 CHRONIC FOOT PAIN, UNSPECIFIED LATERALITY: ICD-10-CM

## 2023-06-02 DIAGNOSIS — M79.673 CHRONIC FOOT PAIN, UNSPECIFIED LATERALITY: ICD-10-CM

## 2023-06-02 RX ORDER — HYDROCODONE BITARTRATE AND ACETAMINOPHEN 10; 325 MG/1; MG/1
1 TABLET ORAL EVERY 4 HOURS PRN
Qty: 180 TABLET | Refills: 0 | Status: SHIPPED | OUTPATIENT
Start: 2023-06-02

## 2023-06-30 DIAGNOSIS — R10.9 CHRONIC ABDOMINAL PAIN: ICD-10-CM

## 2023-06-30 DIAGNOSIS — G89.29 CHRONIC FOOT PAIN, UNSPECIFIED LATERALITY: ICD-10-CM

## 2023-06-30 DIAGNOSIS — M79.673 CHRONIC FOOT PAIN, UNSPECIFIED LATERALITY: ICD-10-CM

## 2023-06-30 DIAGNOSIS — G89.29 CHRONIC ABDOMINAL PAIN: ICD-10-CM

## 2023-06-30 RX ORDER — HYDROCODONE BITARTRATE AND ACETAMINOPHEN 10; 325 MG/1; MG/1
1 TABLET ORAL EVERY 4 HOURS PRN
Qty: 180 TABLET | Refills: 0 | Status: SHIPPED | OUTPATIENT
Start: 2023-06-30

## 2023-07-28 DIAGNOSIS — R10.9 CHRONIC ABDOMINAL PAIN: ICD-10-CM

## 2023-07-28 DIAGNOSIS — G89.29 CHRONIC FOOT PAIN, UNSPECIFIED LATERALITY: ICD-10-CM

## 2023-07-28 DIAGNOSIS — M79.673 CHRONIC FOOT PAIN, UNSPECIFIED LATERALITY: ICD-10-CM

## 2023-07-28 DIAGNOSIS — G89.29 CHRONIC ABDOMINAL PAIN: ICD-10-CM

## 2023-07-28 RX ORDER — HYDROCODONE BITARTRATE AND ACETAMINOPHEN 10; 325 MG/1; MG/1
1 TABLET ORAL EVERY 4 HOURS PRN
Qty: 180 TABLET | Refills: 0 | Status: SHIPPED | OUTPATIENT
Start: 2023-07-28

## 2023-07-28 NOTE — TELEPHONE ENCOUNTER
Disp Refills Start End    HYDROcodone-acetaminophen (NORCO)  MG Oral Tab 180 tablet 0 6/30/2023     Sig - Route: Take 1 tablet by mouth every 4 (four) hours as needed for Pain (Take 1 every 4 hours as needed for pain).  - Oral    Sent to pharmacy as: HYDROcodone-Acetaminophen  MG Oral Tablet    Earliest Fill Date: 6/30/2023      Last OV: 5/15/23    Approve/deny:

## 2023-08-29 DIAGNOSIS — G89.29 CHRONIC FOOT PAIN, UNSPECIFIED LATERALITY: ICD-10-CM

## 2023-08-29 DIAGNOSIS — M79.673 CHRONIC FOOT PAIN, UNSPECIFIED LATERALITY: ICD-10-CM

## 2023-08-29 DIAGNOSIS — G89.29 CHRONIC ABDOMINAL PAIN: ICD-10-CM

## 2023-08-29 DIAGNOSIS — R10.9 CHRONIC ABDOMINAL PAIN: ICD-10-CM

## 2023-08-29 RX ORDER — HYDROCODONE BITARTRATE AND ACETAMINOPHEN 10; 325 MG/1; MG/1
1 TABLET ORAL EVERY 4 HOURS PRN
Qty: 180 TABLET | Refills: 0 | Status: SHIPPED | OUTPATIENT
Start: 2023-08-29

## 2023-09-19 DIAGNOSIS — E03.9 HYPOTHYROIDISM, UNSPECIFIED TYPE: ICD-10-CM

## 2023-09-19 RX ORDER — LEVOTHYROXINE SODIUM 0.12 MG/1
125 TABLET ORAL
Qty: 90 TABLET | Refills: 1 | Status: SHIPPED | OUTPATIENT
Start: 2023-09-19

## 2023-09-29 DIAGNOSIS — M79.673 CHRONIC FOOT PAIN, UNSPECIFIED LATERALITY: ICD-10-CM

## 2023-09-29 DIAGNOSIS — R10.9 CHRONIC ABDOMINAL PAIN: ICD-10-CM

## 2023-09-29 DIAGNOSIS — G89.29 CHRONIC FOOT PAIN, UNSPECIFIED LATERALITY: ICD-10-CM

## 2023-09-29 DIAGNOSIS — G89.29 CHRONIC ABDOMINAL PAIN: ICD-10-CM

## 2023-09-29 RX ORDER — HYDROCODONE BITARTRATE AND ACETAMINOPHEN 10; 325 MG/1; MG/1
1 TABLET ORAL EVERY 4 HOURS PRN
Qty: 180 TABLET | Refills: 0 | Status: SHIPPED | OUTPATIENT
Start: 2023-09-29

## 2023-09-29 NOTE — TELEPHONE ENCOUNTER
HYDROcodone-acetaminophen [facility])  MG Oral Tab     [facility] [address] [facility], [address] [facility], [phone], [phone]

## 2023-10-03 ENCOUNTER — TELEPHONE (OUTPATIENT)
Dept: FAMILY MEDICINE CLINIC | Facility: CLINIC | Age: 58
End: 2023-10-03

## 2023-10-03 DIAGNOSIS — R10.9 CHRONIC ABDOMINAL PAIN: ICD-10-CM

## 2023-10-03 DIAGNOSIS — G89.29 CHRONIC ABDOMINAL PAIN: ICD-10-CM

## 2023-10-03 DIAGNOSIS — M79.673 CHRONIC FOOT PAIN, UNSPECIFIED LATERALITY: ICD-10-CM

## 2023-10-03 DIAGNOSIS — G89.29 CHRONIC FOOT PAIN, UNSPECIFIED LATERALITY: ICD-10-CM

## 2023-10-03 RX ORDER — HYDROCODONE BITARTRATE AND ACETAMINOPHEN 7.5; 325 MG/1; MG/1
1 TABLET ORAL EVERY 4 HOURS PRN
Qty: 60 TABLET | Refills: 0 | Status: SHIPPED | OUTPATIENT
Start: 2023-10-03 | End: 2023-10-04

## 2023-10-03 NOTE — TELEPHONE ENCOUNTER
Could write for lower dose, but that ends up being a 25% reduction in pain medication, so a relatively big change there. Could write for shorter course of lower amount but double so 5/325 1-2 tabs every 4 hours, but that would be too much tylenol.     For now will send in 7.5/325 #60 to help while looking to fill original script    Arnaldo Coats MD

## 2023-10-03 NOTE — TELEPHONE ENCOUNTER
Pt is having no luck in finding the hydrocho at the pharmacies. Can he be put on something else or different dose?

## 2023-10-04 RX ORDER — HYDROCODONE BITARTRATE AND ACETAMINOPHEN 10; 325 MG/1; MG/1
1 TABLET ORAL EVERY 4 HOURS PRN
Qty: 180 TABLET | Refills: 0 | Status: SHIPPED | OUTPATIENT
Start: 2023-10-04

## 2023-10-04 NOTE — TELEPHONE ENCOUNTER
Pt was unable to fill 9/29/23 Norco yet due to shortage.   Pt did not want lower dose  Pt wants script sent to [facility] in [place] [address]

## 2023-10-04 NOTE — TELEPHONE ENCOUNTER
Patient located the 10-325mg [address] listed below has it in stock. [address]  Pharmacy Phone:  [phone]    He did not  the other medication. He didn't want to cancel it until we get it filled at this pharmacy. It has been very hard to find. Patient is out of medication. Cancer survivor and cannot be without this medication.

## 2023-10-29 DIAGNOSIS — E88.810 METABOLIC SYNDROME: ICD-10-CM

## 2023-10-29 DIAGNOSIS — E78.1 HYPERTRIGLYCERIDEMIA: ICD-10-CM

## 2023-10-30 RX ORDER — PRAVASTATIN SODIUM 20 MG
20 TABLET ORAL NIGHTLY
Qty: 90 TABLET | Refills: 1 | Status: SHIPPED | OUTPATIENT
Start: 2023-10-30

## 2023-10-30 RX ORDER — PRAVASTATIN SODIUM 20 MG
20 TABLET ORAL NIGHTLY
Qty: 90 TABLET | Refills: 2 | Status: SHIPPED | OUTPATIENT
Start: 2023-10-30

## 2023-11-02 DIAGNOSIS — G89.29 CHRONIC FOOT PAIN, UNSPECIFIED LATERALITY: ICD-10-CM

## 2023-11-02 DIAGNOSIS — M79.673 CHRONIC FOOT PAIN, UNSPECIFIED LATERALITY: ICD-10-CM

## 2023-11-02 DIAGNOSIS — R10.9 CHRONIC ABDOMINAL PAIN: ICD-10-CM

## 2023-11-02 DIAGNOSIS — G89.29 CHRONIC ABDOMINAL PAIN: ICD-10-CM

## 2023-11-02 RX ORDER — HYDROCODONE BITARTRATE AND ACETAMINOPHEN 10; 325 MG/1; MG/1
1 TABLET ORAL EVERY 4 HOURS PRN
Qty: 180 TABLET | Refills: 0 | Status: SHIPPED | OUTPATIENT
Start: 2023-11-02

## 2023-11-02 NOTE — TELEPHONE ENCOUNTER
No future appt  Last OV: 5/15/23  Last refill:   Medication Quantity Refills Start End   HYDROcodone-acetaminophen (NORCO)  MG Oral Tab 180 tablet 0 10/4/2023    Sig:   Take 1 tablet by mouth every 4 (four) hours as needed for Pain (Take 1 every 4 hours as needed for       Approve/deny:

## 2023-11-07 RX ORDER — LOSARTAN POTASSIUM 50 MG/1
50 TABLET ORAL DAILY
Qty: 90 TABLET | Refills: 1 | Status: SHIPPED | OUTPATIENT
Start: 2023-11-07

## 2023-12-04 DIAGNOSIS — G89.29 CHRONIC ABDOMINAL PAIN: ICD-10-CM

## 2023-12-04 DIAGNOSIS — M79.673 CHRONIC FOOT PAIN, UNSPECIFIED LATERALITY: ICD-10-CM

## 2023-12-04 DIAGNOSIS — G89.29 CHRONIC FOOT PAIN, UNSPECIFIED LATERALITY: ICD-10-CM

## 2023-12-04 DIAGNOSIS — R10.9 CHRONIC ABDOMINAL PAIN: ICD-10-CM

## 2023-12-04 RX ORDER — HYDROCODONE BITARTRATE AND ACETAMINOPHEN 10; 325 MG/1; MG/1
1 TABLET ORAL EVERY 4 HOURS PRN
Qty: 180 TABLET | Refills: 0 | Status: SHIPPED | OUTPATIENT
Start: 2023-12-04

## 2024-01-02 DIAGNOSIS — R10.9 CHRONIC ABDOMINAL PAIN: ICD-10-CM

## 2024-01-02 DIAGNOSIS — G89.29 CHRONIC FOOT PAIN, UNSPECIFIED LATERALITY: ICD-10-CM

## 2024-01-02 DIAGNOSIS — G89.29 CHRONIC ABDOMINAL PAIN: ICD-10-CM

## 2024-01-02 DIAGNOSIS — M79.673 CHRONIC FOOT PAIN, UNSPECIFIED LATERALITY: ICD-10-CM

## 2024-01-02 RX ORDER — HYDROCODONE BITARTRATE AND ACETAMINOPHEN 10; 325 MG/1; MG/1
1 TABLET ORAL EVERY 4 HOURS PRN
Qty: 180 TABLET | Refills: 0 | Status: SHIPPED | OUTPATIENT
Start: 2024-01-02

## 2024-01-02 NOTE — TELEPHONE ENCOUNTER
HYDROcodone-acetaminophen (NORCO)  MG Oral Tab     [id number] - [place], [place] - [address] [facility] [address] [phone], [phone]

## 2024-01-23 NOTE — TELEPHONE ENCOUNTER
Tawnya called from [facility] she will like for the nurse to fax over a Blood pressure clearance letter because the patient BP was 192/112 on  (08/03/2023). BP was taken before his teeth cleaning.   F:[phone]  P:[phone]   Will approve, but do want visit soon, I like to see folks with chronic opioids every 3 months at minimum

## 2024-01-31 DIAGNOSIS — M79.673 CHRONIC FOOT PAIN, UNSPECIFIED LATERALITY: ICD-10-CM

## 2024-01-31 DIAGNOSIS — G89.29 CHRONIC FOOT PAIN, UNSPECIFIED LATERALITY: ICD-10-CM

## 2024-01-31 DIAGNOSIS — G89.29 CHRONIC ABDOMINAL PAIN: ICD-10-CM

## 2024-01-31 DIAGNOSIS — R10.9 CHRONIC ABDOMINAL PAIN: ICD-10-CM

## 2024-01-31 RX ORDER — HYDROCODONE BITARTRATE AND ACETAMINOPHEN 10; 325 MG/1; MG/1
1 TABLET ORAL EVERY 4 HOURS PRN
Qty: 180 TABLET | Refills: 0 | Status: SHIPPED | OUTPATIENT
Start: 2024-01-31

## 2024-02-19 DIAGNOSIS — I10 ESSENTIAL HYPERTENSION: ICD-10-CM

## 2024-02-19 RX ORDER — HYDROCHLOROTHIAZIDE 12.5 MG/1
12.5 TABLET ORAL DAILY
Qty: 90 TABLET | Refills: 1 | Status: SHIPPED | OUTPATIENT
Start: 2024-02-19

## 2024-02-28 DIAGNOSIS — R10.9 CHRONIC ABDOMINAL PAIN: ICD-10-CM

## 2024-02-28 DIAGNOSIS — G89.29 CHRONIC FOOT PAIN, UNSPECIFIED LATERALITY: ICD-10-CM

## 2024-02-28 DIAGNOSIS — M79.673 CHRONIC FOOT PAIN, UNSPECIFIED LATERALITY: ICD-10-CM

## 2024-02-28 DIAGNOSIS — G89.29 CHRONIC ABDOMINAL PAIN: ICD-10-CM

## 2024-02-28 RX ORDER — HYDROCODONE BITARTRATE AND ACETAMINOPHEN 10; 325 MG/1; MG/1
1 TABLET ORAL EVERY 4 HOURS PRN
Qty: 180 TABLET | Refills: 0 | Status: SHIPPED | OUTPATIENT
Start: 2024-02-28

## 2024-02-28 NOTE — TELEPHONE ENCOUNTER
I recommend appointment, it's not consider standard of care to prescribed controlled substances with once yearly visits    Emmanuel Bernal MD

## 2024-02-28 NOTE — TELEPHONE ENCOUNTER
Patient is needing refill of HYDROcodone-acetaminophen (NORCO)  MG Oral Tab     Send to   [id number] - [place], [place] - [address] AT [facility] ([facility], [phone], [phone]

## 2024-03-23 DIAGNOSIS — E78.1 HYPERTRIGLYCERIDEMIA: ICD-10-CM

## 2024-03-23 DIAGNOSIS — E88.810 METABOLIC SYNDROME: ICD-10-CM

## 2024-03-23 RX ORDER — FENOFIBRATE 160 MG/1
160 TABLET ORAL DAILY
Qty: 90 TABLET | Refills: 1 | Status: SHIPPED | OUTPATIENT
Start: 2024-03-23

## 2024-03-23 RX ORDER — FENOFIBRATE 160 MG/1
160 TABLET ORAL DAILY
Qty: 90 TABLET | Refills: 1 | OUTPATIENT
Start: 2024-03-23

## 2024-03-26 DIAGNOSIS — I10 ESSENTIAL HYPERTENSION: ICD-10-CM

## 2024-03-27 ENCOUNTER — TELEPHONE (OUTPATIENT)
Dept: FAMILY MEDICINE CLINIC | Facility: CLINIC | Age: 59
End: 2024-03-27

## 2024-03-27 DIAGNOSIS — M79.673 CHRONIC FOOT PAIN, UNSPECIFIED LATERALITY: ICD-10-CM

## 2024-03-27 DIAGNOSIS — G89.29 CHRONIC FOOT PAIN, UNSPECIFIED LATERALITY: ICD-10-CM

## 2024-03-27 DIAGNOSIS — G89.29 CHRONIC ABDOMINAL PAIN: ICD-10-CM

## 2024-03-27 DIAGNOSIS — R10.9 CHRONIC ABDOMINAL PAIN: ICD-10-CM

## 2024-03-27 RX ORDER — HYDROCHLOROTHIAZIDE 12.5 MG/1
12.5 TABLET ORAL DAILY
Qty: 90 TABLET | Refills: 1 | Status: SHIPPED | OUTPATIENT
Start: 2024-03-27 | End: 2024-03-27

## 2024-03-27 RX ORDER — HYDROCODONE BITARTRATE AND ACETAMINOPHEN 10; 325 MG/1; MG/1
1 TABLET ORAL EVERY 4 HOURS PRN
Qty: 180 TABLET | Refills: 0 | Status: SHIPPED | OUTPATIENT
Start: 2024-03-27

## 2024-04-09 NOTE — TELEPHONE ENCOUNTER
Please refill hydrocodone   [facility] [address] [facility] in [place] Alerted by RN patient with a critical pH of 7.13 on VBG.  D/w on-call nephrology Dr. Scout Erazo. Per recommendations, increase soduim bicarb tablets to 1950 TID as bicarb has been improving with tablets. Caution with bumex gtt for now in the setting of overload, pending TTE.  Repeat VBG in AM.  Will monitor urine output. Above discussed with Dr. Lynne who agrees with plan. Will endorse to primary team in AM    Vital Signs Last 24 Hrs  T(C): 36.4 (09 Apr 2024 21:04), Max: 36.8 (09 Apr 2024 01:31)  T(F): 97.5 (09 Apr 2024 21:04), Max: 98.2 (09 Apr 2024 01:31)  HR: 102 (09 Apr 2024 21:04) (67 - 115)  BP: 126/76 (09 Apr 2024 21:04) (126/76 - 137/93)  BP(mean): --  RR: 18 (09 Apr 2024 21:04) (18 - 18)  SpO2: 93% (09 Apr 2024 21:04) (93% - 100%)    Parameters below as of 09 Apr 2024 21:04  Patient On (Oxygen Delivery Method): room air    Keily Cuba PA-C  Dept of Medicine

## 2024-04-26 ENCOUNTER — TELEPHONE (OUTPATIENT)
Dept: FAMILY MEDICINE CLINIC | Facility: CLINIC | Age: 59
End: 2024-04-26

## 2024-04-26 DIAGNOSIS — M79.673 CHRONIC FOOT PAIN, UNSPECIFIED LATERALITY: ICD-10-CM

## 2024-04-26 DIAGNOSIS — G89.29 CHRONIC FOOT PAIN, UNSPECIFIED LATERALITY: ICD-10-CM

## 2024-04-26 DIAGNOSIS — R10.9 CHRONIC ABDOMINAL PAIN: ICD-10-CM

## 2024-04-26 DIAGNOSIS — G89.29 CHRONIC ABDOMINAL PAIN: ICD-10-CM

## 2024-04-26 DIAGNOSIS — G60.9 IDIOPATHIC PERIPHERAL NEUROPATHY: Primary | ICD-10-CM

## 2024-04-26 RX ORDER — HYDROCODONE BITARTRATE AND ACETAMINOPHEN 10; 325 MG/1; MG/1
1 TABLET ORAL EVERY 4 HOURS PRN
Qty: 180 TABLET | Refills: 0 | Status: SHIPPED | OUTPATIENT
Start: 2024-04-26

## 2024-05-07 DIAGNOSIS — E03.9 HYPOTHYROIDISM, UNSPECIFIED TYPE: ICD-10-CM

## 2024-05-07 RX ORDER — LEVOTHYROXINE SODIUM 0.12 MG/1
125 TABLET ORAL
Qty: 90 TABLET | Refills: 1 | Status: SHIPPED | OUTPATIENT
Start: 2024-05-07

## 2024-05-20 ENCOUNTER — OFFICE VISIT (OUTPATIENT)
Dept: FAMILY MEDICINE CLINIC | Facility: CLINIC | Age: 59
End: 2024-05-20

## 2024-05-20 VITALS
DIASTOLIC BLOOD PRESSURE: 84 MMHG | OXYGEN SATURATION: 98 % | SYSTOLIC BLOOD PRESSURE: 136 MMHG | HEIGHT: 68 IN | WEIGHT: 205 LBS | BODY MASS INDEX: 31.07 KG/M2 | HEART RATE: 57 BPM | RESPIRATION RATE: 16 BRPM

## 2024-05-20 DIAGNOSIS — Z12.5 PROSTATE CANCER SCREENING: ICD-10-CM

## 2024-05-20 DIAGNOSIS — Z13.21 ENCOUNTER FOR VITAMIN DEFICIENCY SCREENING: ICD-10-CM

## 2024-05-20 DIAGNOSIS — Z79.891 OPIOID USE AGREEMENT EXISTS: ICD-10-CM

## 2024-05-20 DIAGNOSIS — G60.9 IDIOPATHIC PERIPHERAL NEUROPATHY: ICD-10-CM

## 2024-05-20 DIAGNOSIS — Z00.00 WELLNESS EXAMINATION: Primary | ICD-10-CM

## 2024-05-20 DIAGNOSIS — E88.810 METABOLIC SYNDROME: ICD-10-CM

## 2024-05-20 DIAGNOSIS — Z13.0 SCREENING FOR DEFICIENCY ANEMIA: ICD-10-CM

## 2024-05-20 DIAGNOSIS — T56.0X1D CHRONIC LEAD-INDUCED GOUT INVOLVING TOE WITHOUT TOPHUS, UNSPECIFIED LATERALITY, SUBSEQUENT ENCOUNTER: ICD-10-CM

## 2024-05-20 DIAGNOSIS — R68.82 LIBIDO, DECREASED: ICD-10-CM

## 2024-05-20 DIAGNOSIS — I10 ESSENTIAL HYPERTENSION: ICD-10-CM

## 2024-05-20 DIAGNOSIS — G89.29 CHRONIC FOOT PAIN, UNSPECIFIED LATERALITY: ICD-10-CM

## 2024-05-20 DIAGNOSIS — M79.673 CHRONIC FOOT PAIN, UNSPECIFIED LATERALITY: ICD-10-CM

## 2024-05-20 DIAGNOSIS — G89.29 CHRONIC ABDOMINAL PAIN: ICD-10-CM

## 2024-05-20 DIAGNOSIS — R53.83 OTHER FATIGUE: ICD-10-CM

## 2024-05-20 DIAGNOSIS — E78.1 HYPERTRIGLYCERIDEMIA: ICD-10-CM

## 2024-05-20 DIAGNOSIS — M1A.1790 CHRONIC LEAD-INDUCED GOUT INVOLVING TOE WITHOUT TOPHUS, UNSPECIFIED LATERALITY, SUBSEQUENT ENCOUNTER: ICD-10-CM

## 2024-05-20 DIAGNOSIS — E03.9 HYPOTHYROIDISM, UNSPECIFIED TYPE: ICD-10-CM

## 2024-05-20 DIAGNOSIS — R10.9 CHRONIC ABDOMINAL PAIN: ICD-10-CM

## 2024-05-20 DIAGNOSIS — E53.8 B12 DEFICIENCY: ICD-10-CM

## 2024-05-20 DIAGNOSIS — Z13.220 LIPID SCREENING: ICD-10-CM

## 2024-05-20 DIAGNOSIS — R73.09 ELEVATED GLUCOSE: ICD-10-CM

## 2024-05-20 PROCEDURE — 99396 PREV VISIT EST AGE 40-64: CPT | Performed by: FAMILY MEDICINE

## 2024-05-20 RX ORDER — LOSARTAN POTASSIUM 50 MG/1
50 TABLET ORAL DAILY
Qty: 90 TABLET | Refills: 1 | Status: SHIPPED | OUTPATIENT
Start: 2024-05-20

## 2024-05-20 RX ORDER — HYDROCODONE BITARTRATE AND ACETAMINOPHEN 10; 325 MG/1; MG/1
1 TABLET ORAL EVERY 4 HOURS PRN
Qty: 180 TABLET | Refills: 0 | Status: SHIPPED | OUTPATIENT
Start: 2024-05-24

## 2024-05-20 NOTE — PROGRESS NOTES
HPI:   Rohan Decker is a 59 year old male who presents for an Annual Health Visit.     Dealing with rectal itching.    No other major changes, back has been more of a mess, usually his chronic pain has been neuropathy, and abdominal pain, this is worse and new. But can come and go, can be rough as the day goes on. Wife has had issues too so maybe new mattress.    Otherwise doing well.    Able to work through the pain, doesn't lock up.        Allergies:     Allergies   Allergen Reactions    Eggs Or Egg-Derived Products OTHER (SEE COMMENTS)     Fried eggs cause lip numbness       CURRENT MEDICATIONS   Current Outpatient Medications   Medication Sig Dispense Refill    levothyroxine 125 MCG Oral Tab Take 1 tablet (125 mcg total) by mouth before breakfast. 90 tablet 1    HYDROcodone-acetaminophen (NORCO)  MG Oral Tab Take 1 tablet by mouth every 4 (four) hours as needed for Pain. 180 tablet 0    hydroCHLOROthiazide 12.5 MG Oral Tab Take 1 tablet (12.5 mg total) by mouth daily. 90 tablet 1    HYDROcodone-acetaminophen (NORCO)  MG Oral Tab Take 1 tablet by mouth every 4 (four) hours as needed for Pain (Take 1 every 4 hours as needed for pain). 180 tablet 0    FENOFIBRATE 160 MG Oral Tab TAKE 1 TABLET(160 MG) BY MOUTH DAILY 90 tablet 1    LOSARTAN 50 MG Oral Tab TAKE 1 TABLET(50 MG) BY MOUTH DAILY 90 tablet 1    pravastatin 20 MG Oral Tab Take 1 tablet (20 mg total) by mouth nightly. 90 tablet 2    albuterol 108 (90 Base) MCG/ACT Inhalation Aero Soln Inhale 2 puffs into the lungs every 6 (six) hours as needed for Wheezing. 1 each 0    allopurinol 100 MG Oral Tab Take 1 tablet (100 mg total) by mouth daily. 90 tablet 1    Budesonide-Formoterol Fumarate 160-4.5 MCG/ACT Inhalation Aerosol Inhale 2 puffs into the lungs 2 (two) times daily. 3 each 0    ketoconazole 2 % External Cream Apply 1 g topically 2 (two) times daily. Apply bid 60 g 1    indomethacin 50 MG Oral Cap TAKE 1 CAPSULE BY MOUTH EVERY 6 HOURS  AS NEEDED FOR GOUT PAIN 120 capsule 3    Fexofenadine HCl 180 MG Oral Tab Take 1 tablet (180 mg total) by mouth as needed.        HISTORICAL INFORMATION   Past Medical History:    Asthma (HCC)    BPPV (benign paroxysmal positional vertigo)    Cancer (HCC)    Appendix cancer     Disorder of thyroid    Extrinsic asthma, unspecified    Gout    High blood pressure    High cholesterol    Localized cancer of appendix (HCC)    Neuropathy    feet    Personal history of antineoplastic chemotherapy    2019    PONV (postoperative nausea and vomiting)    Primary appendiceal adenocarcinoma (HCC)    Visual impairment    glasses      Past Surgical History:   Procedure Laterality Date    Appendectomy  2018    interval appey    Cholecystectomy      Colonoscopy N/A 2018    Procedure: COLONOSCOPY, POSSIBLE BIOPSY, POSSIBLE POLYPECTOMY 88734;  Surgeon: Zeus Ortiz MD;  Location: [facility]    Hernia surgery  2018    umbilical    Other surgical history  2018    Reopening of recent laparotomy, repair of dehiscence    Other surgical history  2018    ABEXO-Durjtkcwfmrs-qfoahztl resection of the terminal ileum with partial colon resection and ileocolic anastomosis, Peritonectomy, Omentectomy (greater and lesser), Cytoreductive surgery, Mobilization of the splenic flexure, and Hyperthermic intraperitoneal chemotherapy using mitomycin C on 18.     Other surgical history  2019    Debridement, abdominal wall wound, to include soft tissue and fascia, Vacuum dressing application      Family History   Problem Relation Age of Onset    Lipids Maternal Grandfather       SOCIAL HISTORY   Social History     Socioeconomic History    Marital status: Life Partner   Tobacco Use    Smoking status: Former     Current packs/day: 0.00     Average packs/day: 1 pack/day for 25.0 years (25.0 ttl pk-yrs)     Types: Cigarettes     Start date: 1991     Quit date: 2016     Years since quittin.1     Smokeless tobacco: Never   Vaping Use    Vaping status: Never Used   Substance and Sexual Activity    Alcohol use: Not Currently    Drug use: Yes     Types: Cannabis     Comment: Smokes Daily    Other Topics Concern    Caffeine Concern Yes    Exercise Yes     Comment: no regular regimen     Social History     Social History Narrative    Not on file        REVIEW OF SYSTEMS:     Constitutional: negative  Eyes: negative  ENT: negative  Respiratory: negative  Cardiovascular: negative  Gastrointestinal:  usual chronic diarrhea  Integument/Breast:  rectal itching  Genitourinary:  nocturia x2 soetimes.  Heme/Lymph: negative  Musculoskeletal:  see HPI  Neurological:  no changes  Psych: negative  Endocrine: negative  Allergic/Immune: negative    EXAM:   /84   Pulse 57   Resp 16   Ht 5' 8\" (1.727 m)   Wt 205 lb (93 kg)   SpO2 98%   BMI 31.17 kg/m²    Wt Readings from Last 6 Encounters:   05/20/24 205 lb (93 kg)   05/15/23 216 lb (98 kg)   10/14/22 202 lb (91.6 kg)   05/16/22 203 lb (92.1 kg)   09/20/21 205 lb (93 kg)   03/19/21 205 lb (93 kg)     Body mass index is 31.17 kg/m².    General: alert, appears stated age, and cooperative  Head: Normocephalic, without obvious abnormality, atraumatic  Eyes: conjunctivae/corneas clear. PERRL, EOM's intact. Fundi benign.  Ears: normal TM's and external ear canals both ears  Nose: Nares normal. Septum midline. Mucosa normal. No drainage or sinus tenderness.  Throat: lips, mucosa, and tongue normal; teeth and gums normal  Neck: no adenopathy, no carotid bruit, no JVD, supple, symmetrical, trachea midline, and thyroid not enlarged, symmetric, no tenderness/mass/nodules  Heart: S1, S2 normal, no murmur, click, rub or gallop, regular rate and rhythm  Lungs: clear to auscultation bilaterally  Chest wall: no tenderness  Abdomen: soft, non-tender; bowel sounds normal; no masses,  no organomegaly  : deferred  Back: symmetric, no curvature. ROM normal. No CVA  tenderness.  Extremities: extremities normal, atraumatic, no cyanosis or edema  Pulses: 2+ and symmetric  Skin: Skin color, texture, turgor normal. No rashes or lesions  Lymph Nodes: Cervical, supraclavicular, and axillary nodes normal.  Neurologic: Grossly normal    ASSESSMENT AND PLAN:   Rohan was seen today for physical.    Diagnoses and all orders for this visit:    Wellness examination  -     Comp Metabolic Panel (14); Future  -     CBC With Differential With Platelet; Future  -     Hemoglobin A1C; Future  -     Lipid Panel; Future  -     Vitamin B12; Future    Hypothyroidism, unspecified type  -     TSH and Free T4; Future    Idiopathic peripheral neuropathy    Essential hypertension  -     Comp Metabolic Panel (14); Future    Hypertriglyceridemia  -     Comp Metabolic Panel (14); Future  -     Lipid Panel; Future    Metabolic syndrome    Elevated glucose  -     Hemoglobin A1C; Future    B12 deficiency  -     Vitamin B12; Future    Encounter for vitamin deficiency screening  -     Vitamin B12; Future    Lipid screening  -     Lipid Panel; Future    Prostate cancer screening  -     PSA Screen; Future    Screening for deficiency anemia  -     CBC With Differential With Platelet; Future    Opioid use agreement exists  -     Drug Abuse Panel 11 screen; Future    Libido, decreased  -     Testosterone Total [E]; Future    Other fatigue    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    Will get screening and monitoring labs    Plan 6 month follow up    There are no Patient Instructions on file for this visit.    The patient indicates understanding of these issues and agrees to the plan.    Problem List:  Patient Active Problem List   Diagnosis    Hypertriglyceridemia    Hypothyroidism    BPPV (benign paroxysmal positional vertigo)    Tobacco abuse  counseling    Essential hypertension    Hyponatremia    Chronic gout without tophus    Iron deficiency anemia    Chemotherapy-induced thrombocytopenia    B12 deficiency    Idiopathic peripheral neuropathy    Mild persistent asthma without complication (HCC)       Emmanuel Bernal MD  5/20/2024  7:38 AM

## 2024-06-20 DIAGNOSIS — G89.29 CHRONIC FOOT PAIN, UNSPECIFIED LATERALITY: ICD-10-CM

## 2024-06-20 DIAGNOSIS — R10.9 CHRONIC ABDOMINAL PAIN: ICD-10-CM

## 2024-06-20 DIAGNOSIS — M79.673 CHRONIC FOOT PAIN, UNSPECIFIED LATERALITY: ICD-10-CM

## 2024-06-20 DIAGNOSIS — G89.29 CHRONIC ABDOMINAL PAIN: ICD-10-CM

## 2024-06-20 RX ORDER — HYDROCODONE BITARTRATE AND ACETAMINOPHEN 10; 325 MG/1; MG/1
1 TABLET ORAL EVERY 4 HOURS PRN
Qty: 180 TABLET | Refills: 0 | Status: SHIPPED | OUTPATIENT
Start: 2024-06-20

## 2024-07-06 DIAGNOSIS — E78.1 HYPERTRIGLYCERIDEMIA: ICD-10-CM

## 2024-07-06 DIAGNOSIS — E88.810 METABOLIC SYNDROME: ICD-10-CM

## 2024-07-08 RX ORDER — FENOFIBRATE 160 MG/1
160 TABLET ORAL DAILY
Qty: 90 TABLET | Refills: 1 | Status: SHIPPED | OUTPATIENT
Start: 2024-07-08

## 2024-07-08 NOTE — TELEPHONE ENCOUNTER
.A refill request was received for:  Requested Prescriptions     Pending Prescriptions Disp Refills    FENOFIBRATE 160 MG Oral Tab [Pharmacy Med Name: FENOFIBRATE 160MG TABLETS] 90 tablet 1     Sig: TAKE 1 TABLET(160 MG) BY MOUTH DAILY       Last refill date:   3/23/2024    Last office visit: 5/20/2024    Follow up due:  No future appointments.

## 2024-07-17 DIAGNOSIS — G89.29 CHRONIC FOOT PAIN, UNSPECIFIED LATERALITY: ICD-10-CM

## 2024-07-17 DIAGNOSIS — M79.673 CHRONIC FOOT PAIN, UNSPECIFIED LATERALITY: ICD-10-CM

## 2024-07-17 DIAGNOSIS — G89.29 CHRONIC ABDOMINAL PAIN: ICD-10-CM

## 2024-07-17 DIAGNOSIS — R10.9 CHRONIC ABDOMINAL PAIN: ICD-10-CM

## 2024-07-17 RX ORDER — HYDROCODONE BITARTRATE AND ACETAMINOPHEN 10; 325 MG/1; MG/1
1 TABLET ORAL EVERY 4 HOURS PRN
Qty: 180 TABLET | Refills: 0 | Status: SHIPPED | OUTPATIENT
Start: 2024-07-17

## 2024-08-16 DIAGNOSIS — E03.9 HYPOTHYROIDISM, UNSPECIFIED TYPE: ICD-10-CM

## 2024-08-16 DIAGNOSIS — G89.29 CHRONIC ABDOMINAL PAIN: ICD-10-CM

## 2024-08-16 DIAGNOSIS — M79.673 CHRONIC FOOT PAIN, UNSPECIFIED LATERALITY: ICD-10-CM

## 2024-08-16 DIAGNOSIS — R10.9 CHRONIC ABDOMINAL PAIN: ICD-10-CM

## 2024-08-16 DIAGNOSIS — G89.29 CHRONIC FOOT PAIN, UNSPECIFIED LATERALITY: ICD-10-CM

## 2024-08-16 DIAGNOSIS — G60.9 IDIOPATHIC PERIPHERAL NEUROPATHY: ICD-10-CM

## 2024-08-16 RX ORDER — HYDROCODONE BITARTRATE AND ACETAMINOPHEN 10; 325 MG/1; MG/1
1 TABLET ORAL EVERY 4 HOURS PRN
Qty: 180 TABLET | Refills: 0 | Status: SHIPPED | OUTPATIENT
Start: 2024-08-16

## 2024-08-16 NOTE — TELEPHONE ENCOUNTER
.A refill request was received for:  Requested Prescriptions     Pending Prescriptions Disp Refills    HYDROcodone-acetaminophen (NORCO)  MG Oral Tab 180 tablet 0     Sig: Take 1 tablet by mouth every 4 (four) hours as needed for Pain.       Last refill date:   7/17/24    Last office visit: 5/20/24    Follow up due:  No future appointments.

## 2024-08-19 RX ORDER — LEVOTHYROXINE SODIUM 0.12 MG/1
125 TABLET ORAL
Qty: 90 TABLET | Refills: 1 | Status: SHIPPED | OUTPATIENT
Start: 2024-08-19

## 2024-09-13 ENCOUNTER — TELEPHONE (OUTPATIENT)
Dept: FAMILY MEDICINE CLINIC | Facility: CLINIC | Age: 59
End: 2024-09-13

## 2024-09-13 DIAGNOSIS — G89.29 CHRONIC FOOT PAIN, UNSPECIFIED LATERALITY: ICD-10-CM

## 2024-09-13 DIAGNOSIS — R10.9 CHRONIC ABDOMINAL PAIN: ICD-10-CM

## 2024-09-13 DIAGNOSIS — G89.29 CHRONIC ABDOMINAL PAIN: ICD-10-CM

## 2024-09-13 DIAGNOSIS — M79.673 CHRONIC FOOT PAIN, UNSPECIFIED LATERALITY: ICD-10-CM

## 2024-09-13 NOTE — TELEPHONE ENCOUNTER
HYDROcodone-acetaminophen (NORCO)  [facility] to [facility]   Spoke with mom and discussed how to mix ready to use formula to make 22 alejandro.  Mom verbalized understanding.

## 2024-09-16 RX ORDER — HYDROCODONE BITARTRATE AND ACETAMINOPHEN 10; 325 MG/1; MG/1
1 TABLET ORAL EVERY 4 HOURS PRN
Qty: 180 TABLET | Refills: 0 | Status: SHIPPED | OUTPATIENT
Start: 2024-09-16

## 2024-09-22 DIAGNOSIS — E78.1 HYPERTRIGLYCERIDEMIA: ICD-10-CM

## 2024-09-22 DIAGNOSIS — E88.810 METABOLIC SYNDROME: ICD-10-CM

## 2024-09-22 DIAGNOSIS — I10 ESSENTIAL HYPERTENSION: ICD-10-CM

## 2024-09-23 RX ORDER — HYDROCHLOROTHIAZIDE 12.5 MG/1
12.5 TABLET ORAL DAILY
Qty: 90 TABLET | Refills: 1 | Status: SHIPPED | OUTPATIENT
Start: 2024-09-23

## 2024-09-23 RX ORDER — FENOFIBRATE 160 MG/1
160 TABLET ORAL DAILY
Qty: 90 TABLET | Refills: 1 | Status: SHIPPED | OUTPATIENT
Start: 2024-09-23

## 2024-09-23 NOTE — TELEPHONE ENCOUNTER
.A refill request was received for:  Requested Prescriptions     Pending Prescriptions Disp Refills    FENOFIBRATE 160 MG Oral Tab [Pharmacy Med Name: FENOFIBRATE 160MG TABLETS] 90 tablet 1     Sig: TAKE 1 TABLET(160 MG) BY MOUTH DAILY    HYDROCHLOROTHIAZIDE 12.5 MG Oral Tab [Pharmacy Med Name: HYDROCHLOROTHIAZIDE 12.5MG TABLETS] 90 tablet 1     Sig: TAKE 1 TABLET(12.5 MG) BY MOUTH DAILY       Last refill date:   hydrochlorothiazide 3/27/24  Fenofibrate 7/8/24      Last office visit: 5/20/24    Follow up due:  No future appointments.

## 2024-10-03 DIAGNOSIS — E03.9 HYPOTHYROIDISM, UNSPECIFIED TYPE: ICD-10-CM

## 2024-10-03 DIAGNOSIS — I10 ESSENTIAL HYPERTENSION: ICD-10-CM

## 2024-10-03 DIAGNOSIS — E78.1 HYPERTRIGLYCERIDEMIA: ICD-10-CM

## 2024-10-03 DIAGNOSIS — E88.810 METABOLIC SYNDROME: ICD-10-CM

## 2024-10-03 RX ORDER — HYDROCHLOROTHIAZIDE 12.5 MG/1
12.5 TABLET ORAL DAILY
Qty: 90 TABLET | Refills: 0 | Status: SHIPPED | OUTPATIENT
Start: 2024-10-03

## 2024-10-03 RX ORDER — HYDROCHLOROTHIAZIDE 12.5 MG/1
12.5 TABLET ORAL DAILY
Qty: 90 TABLET | Refills: 0 | Status: SHIPPED | OUTPATIENT
Start: 2024-10-03 | End: 2024-10-03

## 2024-10-03 RX ORDER — PRAVASTATIN SODIUM 20 MG
20 TABLET ORAL NIGHTLY
Qty: 90 TABLET | Refills: 0 | Status: SHIPPED | OUTPATIENT
Start: 2024-10-03 | End: 2024-10-03

## 2024-10-03 RX ORDER — FENOFIBRATE 160 MG/1
160 TABLET ORAL DAILY
Qty: 90 TABLET | Refills: 0 | Status: SHIPPED | OUTPATIENT
Start: 2024-10-03 | End: 2024-11-04

## 2024-10-03 RX ORDER — LOSARTAN POTASSIUM 50 MG/1
50 TABLET ORAL DAILY
Qty: 90 TABLET | Refills: 0 | Status: SHIPPED | OUTPATIENT
Start: 2024-10-03 | End: 2024-10-03

## 2024-10-03 RX ORDER — LOSARTAN POTASSIUM 50 MG/1
50 TABLET ORAL DAILY
Qty: 90 TABLET | Refills: 0 | Status: SHIPPED | OUTPATIENT
Start: 2024-10-03 | End: 2024-12-21

## 2024-10-03 RX ORDER — FENOFIBRATE 160 MG/1
160 TABLET ORAL DAILY
Qty: 90 TABLET | Refills: 0 | Status: SHIPPED | OUTPATIENT
Start: 2024-10-03 | End: 2024-10-03

## 2024-10-03 RX ORDER — PRAVASTATIN SODIUM 20 MG
20 TABLET ORAL NIGHTLY
Qty: 90 TABLET | Refills: 0 | Status: SHIPPED | OUTPATIENT
Start: 2024-10-03

## 2024-10-03 RX ORDER — LEVOTHYROXINE SODIUM 125 UG/1
125 TABLET ORAL
Qty: 90 TABLET | Refills: 0 | Status: SHIPPED | OUTPATIENT
Start: 2024-10-03 | End: 2024-10-03

## 2024-10-03 RX ORDER — LEVOTHYROXINE SODIUM 125 UG/1
125 TABLET ORAL
Qty: 90 TABLET | Refills: 0 | Status: SHIPPED | OUTPATIENT
Start: 2024-10-03

## 2024-10-05 ENCOUNTER — LAB ENCOUNTER (OUTPATIENT)
Dept: LAB | Age: 59
End: 2024-10-05
Attending: FAMILY MEDICINE
Payer: COMMERCIAL

## 2024-10-05 DIAGNOSIS — Z79.891 OPIOID USE AGREEMENT EXISTS: ICD-10-CM

## 2024-10-05 DIAGNOSIS — E78.1 HYPERTRIGLYCERIDEMIA: ICD-10-CM

## 2024-10-05 DIAGNOSIS — R73.09 ELEVATED GLUCOSE: ICD-10-CM

## 2024-10-05 DIAGNOSIS — I10 ESSENTIAL HYPERTENSION: ICD-10-CM

## 2024-10-05 DIAGNOSIS — Z12.5 PROSTATE CANCER SCREENING: ICD-10-CM

## 2024-10-05 DIAGNOSIS — E53.8 B12 DEFICIENCY: ICD-10-CM

## 2024-10-05 DIAGNOSIS — M1A.1790 CHRONIC LEAD-INDUCED GOUT INVOLVING TOE WITHOUT TOPHUS, UNSPECIFIED LATERALITY, SUBSEQUENT ENCOUNTER: ICD-10-CM

## 2024-10-05 DIAGNOSIS — Z13.21 ENCOUNTER FOR VITAMIN DEFICIENCY SCREENING: ICD-10-CM

## 2024-10-05 DIAGNOSIS — E03.9 HYPOTHYROIDISM, UNSPECIFIED TYPE: ICD-10-CM

## 2024-10-05 DIAGNOSIS — R68.82 LIBIDO, DECREASED: ICD-10-CM

## 2024-10-05 DIAGNOSIS — Z13.220 LIPID SCREENING: ICD-10-CM

## 2024-10-05 DIAGNOSIS — Z00.00 WELLNESS EXAMINATION: ICD-10-CM

## 2024-10-05 DIAGNOSIS — T56.0X1D CHRONIC LEAD-INDUCED GOUT INVOLVING TOE WITHOUT TOPHUS, UNSPECIFIED LATERALITY, SUBSEQUENT ENCOUNTER: ICD-10-CM

## 2024-10-05 DIAGNOSIS — Z13.0 SCREENING FOR DEFICIENCY ANEMIA: ICD-10-CM

## 2024-10-05 LAB
ALBUMIN SERPL-MCNC: 5.2 G/DL (ref 3.2–4.8)
ALBUMIN/GLOB SERPL: 1.5 {RATIO} (ref 1–2)
ALP LIVER SERPL-CCNC: 62 U/L
ALT SERPL-CCNC: 22 U/L
AMPHET UR QL SCN: NEGATIVE
ANION GAP SERPL CALC-SCNC: 7 MMOL/L (ref 0–18)
AST SERPL-CCNC: 19 U/L (ref ?–34)
BARBITURATES UR QL SCN: NEGATIVE
BASOPHILS # BLD AUTO: 0.09 X10(3) UL (ref 0–0.2)
BASOPHILS NFR BLD AUTO: 1 %
BENZODIAZ UR QL SCN: NEGATIVE
BILIRUB SERPL-MCNC: 0.3 MG/DL (ref 0.2–1.1)
BUN BLD-MCNC: 22 MG/DL (ref 9–23)
CALCIUM BLD-MCNC: 11.1 MG/DL (ref 8.7–10.4)
CHLORIDE SERPL-SCNC: 102 MMOL/L (ref 98–112)
CHOLEST SERPL-MCNC: 160 MG/DL (ref ?–200)
CO2 SERPL-SCNC: 27 MMOL/L (ref 21–32)
COCAINE UR QL: NEGATIVE
COMPLEXED PSA SERPL-MCNC: 1.25 NG/ML (ref ?–4)
CREAT BLD-MCNC: 1.3 MG/DL
CREAT UR-SCNC: 152.5 MG/DL
EGFRCR SERPLBLD CKD-EPI 2021: 63 ML/MIN/1.73M2 (ref 60–?)
EOSINOPHIL # BLD AUTO: 0.26 X10(3) UL (ref 0–0.7)
EOSINOPHIL NFR BLD AUTO: 2.8 %
ERYTHROCYTE [DISTWIDTH] IN BLOOD BY AUTOMATED COUNT: 14.3 %
EST. AVERAGE GLUCOSE BLD GHB EST-MCNC: 128 MG/DL (ref 68–126)
FASTING PATIENT LIPID ANSWER: YES
FASTING STATUS PATIENT QL REPORTED: YES
FENTANYL UR QL SCN: NEGATIVE
GLOBULIN PLAS-MCNC: 3.4 G/DL (ref 2–3.5)
GLUCOSE BLD-MCNC: 121 MG/DL (ref 70–99)
HBA1C MFR BLD: 6.1 % (ref ?–5.7)
HCT VFR BLD AUTO: 49.6 %
HDLC SERPL-MCNC: 30 MG/DL (ref 40–59)
HGB BLD-MCNC: 16.6 G/DL
IMM GRANULOCYTES # BLD AUTO: 0.04 X10(3) UL (ref 0–1)
IMM GRANULOCYTES NFR BLD: 0.4 %
LDLC SERPL CALC-MCNC: 57 MG/DL (ref ?–100)
LYMPHOCYTES # BLD AUTO: 2.64 X10(3) UL (ref 1–4)
LYMPHOCYTES NFR BLD AUTO: 27.9 %
MCH RBC QN AUTO: 29.3 PG (ref 26–34)
MCHC RBC AUTO-ENTMCNC: 33.5 G/DL (ref 31–37)
MCV RBC AUTO: 87.5 FL
MDMA UR QL SCN: NEGATIVE
METHADONE UR QL SCN: NEGATIVE
MONOCYTES # BLD AUTO: 0.6 X10(3) UL (ref 0.1–1)
MONOCYTES NFR BLD AUTO: 6.3 %
NEUTROPHILS # BLD AUTO: 5.82 X10 (3) UL (ref 1.5–7.7)
NEUTROPHILS # BLD AUTO: 5.82 X10(3) UL (ref 1.5–7.7)
NEUTROPHILS NFR BLD AUTO: 61.6 %
NONHDLC SERPL-MCNC: 130 MG/DL (ref ?–130)
OSMOLALITY SERPL CALC.SUM OF ELEC: 287 MOSM/KG (ref 275–295)
OXYCODONE UR QL SCN: NEGATIVE
PCP UR QL SCN: NEGATIVE
PLATELET # BLD AUTO: 238 10(3)UL (ref 150–450)
POTASSIUM SERPL-SCNC: 4.6 MMOL/L (ref 3.5–5.1)
PROT SERPL-MCNC: 8.6 G/DL (ref 5.7–8.2)
RBC # BLD AUTO: 5.67 X10(6)UL
SODIUM SERPL-SCNC: 136 MMOL/L (ref 136–145)
T4 FREE SERPL-MCNC: 1.5 NG/DL (ref 0.8–1.7)
TESTOST SERPL-MCNC: 211.35 NG/DL
TRIGL SERPL-MCNC: 488 MG/DL (ref 30–149)
TSI SER-ACNC: 5.26 MIU/ML (ref 0.55–4.78)
URATE SERPL-MCNC: 9.4 MG/DL
VIT B12 SERPL-MCNC: 315 PG/ML (ref 211–911)
VLDLC SERPL CALC-MCNC: 72 MG/DL (ref 0–30)
WBC # BLD AUTO: 9.5 X10(3) UL (ref 4–11)

## 2024-10-05 PROCEDURE — 84439 ASSAY OF FREE THYROXINE: CPT

## 2024-10-05 PROCEDURE — 83036 HEMOGLOBIN GLYCOSYLATED A1C: CPT

## 2024-10-05 PROCEDURE — 84550 ASSAY OF BLOOD/URIC ACID: CPT

## 2024-10-05 PROCEDURE — 82607 VITAMIN B-12: CPT

## 2024-10-05 PROCEDURE — 84403 ASSAY OF TOTAL TESTOSTERONE: CPT

## 2024-10-05 PROCEDURE — 36415 COLL VENOUS BLD VENIPUNCTURE: CPT

## 2024-10-05 PROCEDURE — 85025 COMPLETE CBC W/AUTO DIFF WBC: CPT

## 2024-10-05 PROCEDURE — 84443 ASSAY THYROID STIM HORMONE: CPT

## 2024-10-05 PROCEDURE — 80307 DRUG TEST PRSMV CHEM ANLYZR: CPT

## 2024-10-05 PROCEDURE — 80053 COMPREHEN METABOLIC PANEL: CPT

## 2024-10-05 PROCEDURE — 80061 LIPID PANEL: CPT

## 2024-10-16 ENCOUNTER — TELEPHONE (OUTPATIENT)
Dept: FAMILY MEDICINE CLINIC | Facility: CLINIC | Age: 59
End: 2024-10-16

## 2024-10-16 DIAGNOSIS — M79.673 CHRONIC FOOT PAIN, UNSPECIFIED LATERALITY: ICD-10-CM

## 2024-10-16 DIAGNOSIS — G89.29 CHRONIC FOOT PAIN, UNSPECIFIED LATERALITY: ICD-10-CM

## 2024-10-16 DIAGNOSIS — G89.29 CHRONIC ABDOMINAL PAIN: ICD-10-CM

## 2024-10-16 DIAGNOSIS — R10.9 CHRONIC ABDOMINAL PAIN: ICD-10-CM

## 2024-10-16 RX ORDER — HYDROCODONE BITARTRATE AND ACETAMINOPHEN 10; 325 MG/1; MG/1
1 TABLET ORAL EVERY 4 HOURS PRN
Qty: 180 TABLET | Refills: 0 | Status: SHIPPED | OUTPATIENT
Start: 2024-10-16

## 2024-10-16 NOTE — TELEPHONE ENCOUNTER
Requesting   Requested Prescriptions     Pending Prescriptions Disp Refills    HYDROcodone-acetaminophen (NORCO)  MG Oral Tab 180 tablet 0     Sig: Take 1 tablet by mouth every 4 (four) hours as needed for Pain (Take 1 every 4 hours as needed for pain).       LOV: 5/20/24  RTC: 6 months   Filled: 9/16/24 #180 with 0 refills      Future Appointments   Date Time Provider Department Center   11/13/2024  7:30 AM Emmanuel Bernal MD [facility] [address]

## 2024-11-04 DIAGNOSIS — E78.1 HYPERTRIGLYCERIDEMIA: ICD-10-CM

## 2024-11-04 DIAGNOSIS — E88.810 METABOLIC SYNDROME: ICD-10-CM

## 2024-11-04 RX ORDER — FENOFIBRATE 160 MG/1
160 TABLET ORAL DAILY
Qty: 90 TABLET | Refills: 1 | Status: SHIPPED | OUTPATIENT
Start: 2024-11-04

## 2024-11-13 ENCOUNTER — OFFICE VISIT (OUTPATIENT)
Dept: FAMILY MEDICINE CLINIC | Facility: CLINIC | Age: 59
End: 2024-11-13
Payer: COMMERCIAL

## 2024-11-13 VITALS
BODY MASS INDEX: 31.98 KG/M2 | OXYGEN SATURATION: 97 % | RESPIRATION RATE: 18 BRPM | WEIGHT: 211 LBS | SYSTOLIC BLOOD PRESSURE: 120 MMHG | HEART RATE: 62 BPM | DIASTOLIC BLOOD PRESSURE: 64 MMHG | HEIGHT: 68 IN

## 2024-11-13 DIAGNOSIS — M1A.1790 CHRONIC LEAD-INDUCED GOUT INVOLVING TOE WITHOUT TOPHUS, UNSPECIFIED LATERALITY, SUBSEQUENT ENCOUNTER: ICD-10-CM

## 2024-11-13 DIAGNOSIS — G89.29 CHRONIC FOOT PAIN, UNSPECIFIED LATERALITY: ICD-10-CM

## 2024-11-13 DIAGNOSIS — T56.0X1D CHRONIC LEAD-INDUCED GOUT INVOLVING TOE WITHOUT TOPHUS, UNSPECIFIED LATERALITY, SUBSEQUENT ENCOUNTER: ICD-10-CM

## 2024-11-13 DIAGNOSIS — M1A.9XX0 CHRONIC GOUT INVOLVING TOE WITHOUT TOPHUS, UNSPECIFIED CAUSE, UNSPECIFIED LATERALITY: Primary | ICD-10-CM

## 2024-11-13 DIAGNOSIS — G60.9 IDIOPATHIC PERIPHERAL NEUROPATHY: ICD-10-CM

## 2024-11-13 DIAGNOSIS — G89.29 CHRONIC ABDOMINAL PAIN: ICD-10-CM

## 2024-11-13 DIAGNOSIS — R10.9 CHRONIC ABDOMINAL PAIN: ICD-10-CM

## 2024-11-13 DIAGNOSIS — M79.673 CHRONIC FOOT PAIN, UNSPECIFIED LATERALITY: ICD-10-CM

## 2024-11-13 PROCEDURE — 99214 OFFICE O/P EST MOD 30 MIN: CPT | Performed by: FAMILY MEDICINE

## 2024-11-13 RX ORDER — HYDROCODONE BITARTRATE AND ACETAMINOPHEN 10; 325 MG/1; MG/1
1 TABLET ORAL EVERY 4 HOURS PRN
Qty: 180 TABLET | Refills: 0 | Status: SHIPPED | OUTPATIENT
Start: 2024-11-15

## 2024-11-13 RX ORDER — COLCHICINE 0.6 MG/1
0.6 TABLET ORAL DAILY
Qty: 30 TABLET | Refills: 1 | Status: SHIPPED | OUTPATIENT
Start: 2024-11-13

## 2024-11-13 RX ORDER — ALLOPURINOL 100 MG/1
200 TABLET ORAL DAILY
Qty: 60 TABLET | Refills: 1 | Status: SHIPPED | OUTPATIENT
Start: 2024-11-13

## 2024-11-13 RX ORDER — FLUTICASONE PROPIONATE 0.05 MG/G
1 OINTMENT TOPICAL 2 TIMES DAILY
Qty: 60 G | Refills: 1 | Status: SHIPPED | OUTPATIENT
Start: 2024-11-13

## 2024-11-13 NOTE — PROGRESS NOTES
[facility] Progress Note    SUBJECTIVE: Rohan Decker 59 year old male is here today for   Chief Complaint   Patient presents with    Medication Follow-Up     Pt reports for med check.       6 month follow up    A1c was a bit elevated at recent check in the last month cut out the regular ice cream intake.    Just had dental work, so changed diet.    Has been getting gout once a month    PMH  Past Medical History:    Asthma (HCC)    BPPV (benign paroxysmal positional vertigo)    Cancer (HCC)    Appendix cancer     Disorder of thyroid    Extrinsic asthma, unspecified    Gout    High blood pressure    High cholesterol    Localized cancer of appendix (HCC)    Neuropathy    feet    Personal history of antineoplastic chemotherapy    4/2019    PONV (postoperative nausea and vomiting)    Primary appendiceal adenocarcinoma (HCC)    Visual impairment    glasses        PSH  Past Surgical History:   Procedure Laterality Date    Appendectomy  11/12/2018    interval appey    Cholecystectomy  2008    Colonoscopy N/A 12/7/2018    Procedure: COLONOSCOPY, POSSIBLE BIOPSY, POSSIBLE POLYPECTOMY 37086;  Surgeon: Zeus Ortiz MD;  Location: [facility]    Hernia surgery  11/12/2018    umbilical    Other surgical history  12/17/2018    Reopening of recent laparotomy, repair of dehiscence    Other surgical history  12/11/2018    WVPJE-Yfcpbmvnbdsr-ohrimgep resection of the terminal ileum with partial colon resection and ileocolic anastomosis, Peritonectomy, Omentectomy (greater and lesser), Cytoreductive surgery, Mobilization of the splenic flexure, and Hyperthermic intraperitoneal chemotherapy using mitomycin C on 12/11/18.     Other surgical history  01/26/2019    Debridement, abdominal wall wound, to include soft tissue and fascia, Vacuum dressing application        Social Hx:  No changes    ROS  See HPI    OBJECTIVE:  /64   Pulse 62   Resp 18   Ht 5' 8\" (1.727 m)   Wt 211 lb (95.7 kg)   SpO2 97%   BMI  32.08 kg/m²         Labs:          Meds:   Current Outpatient Medications   Medication Sig Dispense Refill    Fluticasone Propionate 0.005 % External Ointment Apply 1 Application topically 2 (two) times daily. 60 g 1    colchicine 0.6 MG Oral Tab Take 1 tablet (0.6 mg total) by mouth daily. 30 tablet 1    allopurinol 100 MG Oral Tab Take 2 tablets (200 mg total) by mouth daily. 60 tablet 1    [START ON 11/15/2024] HYDROcodone-acetaminophen (NORCO)  MG Oral Tab Take 1 tablet by mouth every 4 (four) hours as needed for Pain. 180 tablet 0    Fenofibrate 160 MG Oral Tab Take 1 tablet (160 mg total) by mouth daily. 90 tablet 1    HYDROcodone-acetaminophen (NORCO)  MG Oral Tab Take 1 tablet by mouth every 4 (four) hours as needed for Pain (Take 1 every 4 hours as needed for pain). 180 tablet 0    hydroCHLOROthiazide 12.5 MG Oral Tab Take 1 tablet (12.5 mg total) by mouth daily. 90 tablet 0    levothyroxine 125 MCG Oral Tab Take 1 tablet (125 mcg total) by mouth before breakfast. 90 tablet 0    losartan 50 MG Oral Tab Take 1 tablet (50 mg total) by mouth daily. 90 tablet 0    pravastatin 20 MG Oral Tab Take 1 tablet (20 mg total) by mouth nightly. 90 tablet 0    albuterol 108 (90 Base) MCG/ACT Inhalation Aero Soln Inhale 2 puffs into the lungs every 6 (six) hours as needed for Wheezing. 1 each 0    Budesonide-Formoterol Fumarate 160-4.5 MCG/ACT Inhalation Aerosol Inhale 2 puffs into the lungs 2 (two) times daily. 3 each 0    ketoconazole 2 % External Cream Apply 1 g topically 2 (two) times daily. Apply bid 60 g 1    indomethacin 50 MG Oral Cap TAKE 1 CAPSULE BY MOUTH EVERY 6 HOURS AS NEEDED FOR GOUT PAIN 120 capsule 3    Fexofenadine HCl 180 MG Oral Tab Take 1 tablet (180 mg total) by mouth as needed.           Assessment/Plan  Rohan was seen today for medication follow-up.    Diagnoses and all orders for this visit:    Chronic gout involving toe without tophus, unspecified cause, unspecified laterality  -      Uric Acid; Future  -     Comp Metabolic Panel (14) [E]; Future  -     colchicine 0.6 MG Oral Tab; Take 1 tablet (0.6 mg total) by mouth daily.    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    Idiopathic peripheral neuropathy  -     HYDROcodone-acetaminophen (NORCO)  MG Oral Tab; Take 1 tablet by mouth every 4 (four) hours as needed for Pain.    Chronic foot pain, unspecified laterality  -     HYDROcodone-acetaminophen (NORCO)  MG Oral Tab; Take 1 tablet by mouth every 4 (four) hours as needed for Pain.    Chronic abdominal pain  -     HYDROcodone-acetaminophen (NORCO)  MG Oral Tab; Take 1 tablet by mouth every 4 (four) hours as needed for Pain.    Other orders  -     Fluzone trivalent vaccine, PF 0.5mL, 6mo+ (88629)  -     Fluticasone Propionate 0.005 % External Ointment; Apply 1 Application topically 2 (two) times daily.         Overall doing well, reminded of need for colonoscopy.     Plans colonoscopy next year over due.    Will increase allopurinol and recheck labs cover with colchicine    Total Time spent with patient and coordinating care:  20 minutes.    Follow up: 6 months      Emmanuel Bernal MD

## 2024-11-18 ENCOUNTER — PATIENT MESSAGE (OUTPATIENT)
Dept: FAMILY MEDICINE CLINIC | Facility: CLINIC | Age: 59
End: 2024-11-18

## 2024-12-12 DIAGNOSIS — G89.29 CHRONIC FOOT PAIN, UNSPECIFIED LATERALITY: ICD-10-CM

## 2024-12-12 DIAGNOSIS — M79.673 CHRONIC FOOT PAIN, UNSPECIFIED LATERALITY: ICD-10-CM

## 2024-12-12 DIAGNOSIS — G60.9 IDIOPATHIC PERIPHERAL NEUROPATHY: Primary | ICD-10-CM

## 2024-12-12 DIAGNOSIS — G89.29 CHRONIC ABDOMINAL PAIN: ICD-10-CM

## 2024-12-12 DIAGNOSIS — R10.9 CHRONIC ABDOMINAL PAIN: ICD-10-CM

## 2024-12-12 RX ORDER — HYDROCODONE BITARTRATE AND ACETAMINOPHEN 10; 325 MG/1; MG/1
1 TABLET ORAL EVERY 4 HOURS PRN
Qty: 180 TABLET | Refills: 0 | Status: SHIPPED | OUTPATIENT
Start: 2024-12-12

## 2024-12-12 NOTE — TELEPHONE ENCOUNTER
Patient requests refill of  HYDROcodone-acetaminophen (NORCO)  MG Oral Tab   Be sent to  [facility] [address] [place] [address]

## 2024-12-21 DIAGNOSIS — I10 ESSENTIAL HYPERTENSION: ICD-10-CM

## 2024-12-21 RX ORDER — LOSARTAN POTASSIUM 50 MG/1
50 TABLET ORAL DAILY
Qty: 90 TABLET | Refills: 0 | Status: SHIPPED | OUTPATIENT
Start: 2024-12-21

## 2024-12-29 ENCOUNTER — PATIENT MESSAGE (OUTPATIENT)
Dept: FAMILY MEDICINE CLINIC | Facility: CLINIC | Age: 59
End: 2024-12-29

## 2025-01-10 DIAGNOSIS — G89.29 CHRONIC FOOT PAIN, UNSPECIFIED LATERALITY: ICD-10-CM

## 2025-01-10 DIAGNOSIS — G60.9 IDIOPATHIC PERIPHERAL NEUROPATHY: ICD-10-CM

## 2025-01-10 DIAGNOSIS — M79.673 CHRONIC FOOT PAIN, UNSPECIFIED LATERALITY: ICD-10-CM

## 2025-01-10 DIAGNOSIS — G89.29 CHRONIC ABDOMINAL PAIN: ICD-10-CM

## 2025-01-10 DIAGNOSIS — R10.9 CHRONIC ABDOMINAL PAIN: ICD-10-CM

## 2025-01-10 RX ORDER — HYDROCODONE BITARTRATE AND ACETAMINOPHEN 10; 325 MG/1; MG/1
1 TABLET ORAL EVERY 4 HOURS PRN
Qty: 180 TABLET | Refills: 0 | Status: SHIPPED | OUTPATIENT
Start: 2025-01-10

## 2025-01-10 NOTE — TELEPHONE ENCOUNTER
Requesting   Requested Prescriptions     Pending Prescriptions Disp Refills    HYDROcodone-acetaminophen (NORCO)  MG Oral Tab 180 tablet 0     Sig: Take 1 tablet by mouth every 4 (four) hours as needed for Pain.         LOV: 11/13/24  RTC: 6 months  Last Relevant Labs:   Filled: 12/12/24 #180 with 0 refills    No future appointments.

## 2025-01-31 DIAGNOSIS — E78.1 HYPERTRIGLYCERIDEMIA: ICD-10-CM

## 2025-01-31 DIAGNOSIS — M1A.9XX0 CHRONIC GOUT INVOLVING TOE WITHOUT TOPHUS, UNSPECIFIED CAUSE, UNSPECIFIED LATERALITY: ICD-10-CM

## 2025-01-31 DIAGNOSIS — I10 ESSENTIAL HYPERTENSION: ICD-10-CM

## 2025-01-31 DIAGNOSIS — E88.810 METABOLIC SYNDROME: ICD-10-CM

## 2025-01-31 RX ORDER — HYDROCHLOROTHIAZIDE 12.5 MG/1
12.5 TABLET ORAL DAILY
Qty: 90 TABLET | Refills: 3 | Status: SHIPPED | OUTPATIENT
Start: 2025-01-31

## 2025-01-31 RX ORDER — COLCHICINE 0.6 MG/1
0.6 TABLET ORAL DAILY
Qty: 90 TABLET | Refills: 3 | Status: SHIPPED | OUTPATIENT
Start: 2025-01-31

## 2025-01-31 RX ORDER — PRAVASTATIN SODIUM 20 MG
20 TABLET ORAL NIGHTLY
Qty: 90 TABLET | Refills: 3 | Status: SHIPPED | OUTPATIENT
Start: 2025-01-31

## 2025-02-07 DIAGNOSIS — G89.29 CHRONIC FOOT PAIN, UNSPECIFIED LATERALITY: ICD-10-CM

## 2025-02-07 DIAGNOSIS — G60.9 IDIOPATHIC PERIPHERAL NEUROPATHY: ICD-10-CM

## 2025-02-07 DIAGNOSIS — M79.673 CHRONIC FOOT PAIN, UNSPECIFIED LATERALITY: ICD-10-CM

## 2025-02-07 DIAGNOSIS — G89.29 CHRONIC ABDOMINAL PAIN: ICD-10-CM

## 2025-02-07 DIAGNOSIS — R10.9 CHRONIC ABDOMINAL PAIN: ICD-10-CM

## 2025-02-07 RX ORDER — HYDROCODONE BITARTRATE AND ACETAMINOPHEN 10; 325 MG/1; MG/1
1 TABLET ORAL EVERY 4 HOURS PRN
Qty: 180 TABLET | Refills: 0 | Status: SHIPPED | OUTPATIENT
Start: 2025-02-07

## 2025-02-07 NOTE — TELEPHONE ENCOUNTER
Medication requested: HYDROcodone-acetaminophen (NORCO)  MG Oral Tab         Pharmacy name/location:    [facility] #[id number] [place] [address] AT [facility] ([facility], [phone], [phone]       LOV:  11/18/24

## 2025-02-10 DIAGNOSIS — I10 ESSENTIAL HYPERTENSION: ICD-10-CM

## 2025-02-10 RX ORDER — LOSARTAN POTASSIUM 50 MG/1
50 TABLET ORAL DAILY
Qty: 90 TABLET | Refills: 0 | Status: SHIPPED | OUTPATIENT
Start: 2025-02-10

## 2025-03-07 ENCOUNTER — TELEPHONE (OUTPATIENT)
Dept: FAMILY MEDICINE CLINIC | Facility: CLINIC | Age: 60
End: 2025-03-07

## 2025-03-07 DIAGNOSIS — G60.9 IDIOPATHIC PERIPHERAL NEUROPATHY: Primary | ICD-10-CM

## 2025-03-07 DIAGNOSIS — Z79.891 OPIOID USE AGREEMENT EXISTS: ICD-10-CM

## 2025-03-07 RX ORDER — HYDROCODONE BITARTRATE AND ACETAMINOPHEN 10; 325 MG/1; MG/1
1 TABLET ORAL EVERY 4 HOURS PRN
Qty: 180 TABLET | Refills: 0 | Status: SHIPPED | OUTPATIENT
Start: 2025-03-07

## 2025-03-07 NOTE — TELEPHONE ENCOUNTER
HYDROcodone-acetaminophen (NORCO)  MG to [facility] on [address]  [address]

## 2025-03-07 NOTE — TELEPHONE ENCOUNTER
OV with Dr Bernal on 11/13/24  Norco  mg  #180 on 02/07/25  Approve/ deny?  Refill pending physician signature.

## 2025-04-04 DIAGNOSIS — G60.9 IDIOPATHIC PERIPHERAL NEUROPATHY: ICD-10-CM

## 2025-04-04 DIAGNOSIS — Z79.891 OPIOID USE AGREEMENT EXISTS: ICD-10-CM

## 2025-04-04 RX ORDER — HYDROCODONE BITARTRATE AND ACETAMINOPHEN 10; 325 MG/1; MG/1
1 TABLET ORAL EVERY 4 HOURS PRN
Qty: 180 TABLET | Refills: 0 | Status: SHIPPED | OUTPATIENT
Start: 2025-04-04

## 2025-04-04 NOTE — TELEPHONE ENCOUNTER
Medication requested:   HYDROcodone-acetaminophen  MG Oral Tab           Pharmacy name/location:  [facility] #[id number] [place] [address] AT [facility], [phone], [phone]     LOV:  11/13/2024

## 2025-05-02 ENCOUNTER — TELEPHONE (OUTPATIENT)
Dept: FAMILY MEDICINE CLINIC | Facility: CLINIC | Age: 60
End: 2025-05-02

## 2025-05-02 DIAGNOSIS — G60.9 IDIOPATHIC PERIPHERAL NEUROPATHY: ICD-10-CM

## 2025-05-02 DIAGNOSIS — Z79.891 OPIOID USE AGREEMENT EXISTS: ICD-10-CM

## 2025-05-02 RX ORDER — HYDROCODONE BITARTRATE AND ACETAMINOPHEN 10; 325 MG/1; MG/1
1 TABLET ORAL EVERY 4 HOURS PRN
Qty: 180 TABLET | Refills: 0 | Status: SHIPPED | OUTPATIENT
Start: 2025-05-02

## 2025-06-02 DIAGNOSIS — R10.9 CHRONIC ABDOMINAL PAIN: ICD-10-CM

## 2025-06-02 DIAGNOSIS — G89.29 CHRONIC FOOT PAIN, UNSPECIFIED LATERALITY: ICD-10-CM

## 2025-06-02 DIAGNOSIS — G89.29 CHRONIC ABDOMINAL PAIN: ICD-10-CM

## 2025-06-02 DIAGNOSIS — G60.9 IDIOPATHIC PERIPHERAL NEUROPATHY: ICD-10-CM

## 2025-06-02 DIAGNOSIS — M79.673 CHRONIC FOOT PAIN, UNSPECIFIED LATERALITY: ICD-10-CM

## 2025-06-02 RX ORDER — HYDROCODONE BITARTRATE AND ACETAMINOPHEN 10; 325 MG/1; MG/1
1 TABLET ORAL EVERY 4 HOURS PRN
Qty: 180 TABLET | Refills: 0 | Status: SHIPPED | OUTPATIENT
Start: 2025-06-02

## 2025-06-22 DIAGNOSIS — I10 ESSENTIAL HYPERTENSION: ICD-10-CM

## 2025-06-23 RX ORDER — LOSARTAN POTASSIUM 50 MG/1
50 TABLET ORAL DAILY
Qty: 90 TABLET | Refills: 0 | Status: SHIPPED | OUTPATIENT
Start: 2025-06-23

## 2025-07-01 DIAGNOSIS — R10.9 CHRONIC ABDOMINAL PAIN: ICD-10-CM

## 2025-07-01 DIAGNOSIS — G89.29 CHRONIC FOOT PAIN, UNSPECIFIED LATERALITY: ICD-10-CM

## 2025-07-01 DIAGNOSIS — G89.29 CHRONIC ABDOMINAL PAIN: ICD-10-CM

## 2025-07-01 DIAGNOSIS — M79.673 CHRONIC FOOT PAIN, UNSPECIFIED LATERALITY: ICD-10-CM

## 2025-07-01 RX ORDER — HYDROCODONE BITARTRATE AND ACETAMINOPHEN 10; 325 MG/1; MG/1
1 TABLET ORAL EVERY 4 HOURS PRN
Qty: 180 TABLET | Refills: 0 | Status: SHIPPED | OUTPATIENT
Start: 2025-07-01

## 2025-07-01 NOTE — TELEPHONE ENCOUNTER
Medication requested: HYDROcodone-acetaminophen (NORCO)  MG Oral Tab     Is patient requesting 30 or 90 day supply: 180    Pharmacy name/location:    [facility] #[id number] [place] [address] AT [facility] ([facility], [phone], [phone]       LOV:  11/13/24

## 2025-07-01 NOTE — TELEPHONE ENCOUNTER
Requesting   Requested Prescriptions     Pending Prescriptions Disp Refills    HYDROcodone-acetaminophen (NORCO)  MG Oral Tab 180 tablet 0     Sig: Take 1 tablet by mouth every 4 (four) hours as needed for Pain (Take 1 every 4 hours as needed for pain).       LOV: 11/13/24  RTC:   Last Relevant Labs:   Filled: 6/2/25 #180 with 0 refills    Future Appointments   Date Time Provider Department Center   8/4/2025  3:30 PM Emmanuel Bernal MD [facility] [address]

## 2025-07-29 DIAGNOSIS — G60.9 IDIOPATHIC PERIPHERAL NEUROPATHY: ICD-10-CM

## 2025-07-29 DIAGNOSIS — Z79.891 OPIOID USE AGREEMENT EXISTS: ICD-10-CM

## 2025-07-29 RX ORDER — HYDROCODONE BITARTRATE AND ACETAMINOPHEN 10; 325 MG/1; MG/1
1 TABLET ORAL EVERY 4 HOURS PRN
Qty: 180 TABLET | Refills: 0 | Status: SHIPPED | OUTPATIENT
Start: 2025-07-29

## 2025-08-04 ENCOUNTER — OFFICE VISIT (OUTPATIENT)
Dept: FAMILY MEDICINE CLINIC | Facility: CLINIC | Age: 60
End: 2025-08-04

## 2025-08-04 ENCOUNTER — LAB ENCOUNTER (OUTPATIENT)
Dept: LAB | Age: 60
End: 2025-08-04
Attending: FAMILY MEDICINE

## 2025-08-04 VITALS
WEIGHT: 205 LBS | DIASTOLIC BLOOD PRESSURE: 70 MMHG | SYSTOLIC BLOOD PRESSURE: 120 MMHG | HEART RATE: 76 BPM | BODY MASS INDEX: 31.07 KG/M2 | OXYGEN SATURATION: 98 % | RESPIRATION RATE: 16 BRPM | HEIGHT: 68 IN

## 2025-08-04 DIAGNOSIS — M1A.1790 CHRONIC LEAD-INDUCED GOUT INVOLVING TOE WITHOUT TOPHUS, UNSPECIFIED LATERALITY, SUBSEQUENT ENCOUNTER: ICD-10-CM

## 2025-08-04 DIAGNOSIS — Z13.220 LIPID SCREENING: ICD-10-CM

## 2025-08-04 DIAGNOSIS — E78.1 HYPERTRIGLYCERIDEMIA: ICD-10-CM

## 2025-08-04 DIAGNOSIS — T56.0X1D CHRONIC LEAD-INDUCED GOUT INVOLVING TOE WITHOUT TOPHUS, UNSPECIFIED LATERALITY, SUBSEQUENT ENCOUNTER: ICD-10-CM

## 2025-08-04 DIAGNOSIS — J45.30 MILD PERSISTENT ASTHMA WITHOUT COMPLICATION (HCC): ICD-10-CM

## 2025-08-04 DIAGNOSIS — Z12.11 SCREENING FOR COLORECTAL CANCER: ICD-10-CM

## 2025-08-04 DIAGNOSIS — E03.9 HYPOTHYROIDISM, UNSPECIFIED TYPE: ICD-10-CM

## 2025-08-04 DIAGNOSIS — I10 ESSENTIAL HYPERTENSION: ICD-10-CM

## 2025-08-04 DIAGNOSIS — Z00.00 WELLNESS EXAMINATION: Primary | ICD-10-CM

## 2025-08-04 DIAGNOSIS — R73.09 ELEVATED GLUCOSE: ICD-10-CM

## 2025-08-04 DIAGNOSIS — Z12.5 SCREENING FOR PROSTATE CANCER: ICD-10-CM

## 2025-08-04 DIAGNOSIS — Z00.00 WELLNESS EXAMINATION: ICD-10-CM

## 2025-08-04 DIAGNOSIS — M1A.9XX0 CHRONIC GOUT INVOLVING TOE WITHOUT TOPHUS, UNSPECIFIED CAUSE, UNSPECIFIED LATERALITY: ICD-10-CM

## 2025-08-04 DIAGNOSIS — E88.810 METABOLIC SYNDROME: ICD-10-CM

## 2025-08-04 DIAGNOSIS — Z12.12 SCREENING FOR COLORECTAL CANCER: ICD-10-CM

## 2025-08-04 LAB
ALBUMIN SERPL-MCNC: 4.8 G/DL (ref 3.2–4.8)
ALBUMIN/GLOB SERPL: 1.5 (ref 1–2)
ALP LIVER SERPL-CCNC: 47 U/L (ref 45–117)
ALT SERPL-CCNC: 22 U/L (ref 10–49)
ANION GAP SERPL CALC-SCNC: 7 MMOL/L (ref 0–18)
AST SERPL-CCNC: 20 U/L (ref ?–34)
BILIRUB SERPL-MCNC: 0.4 MG/DL (ref 0.2–1.1)
BUN BLD-MCNC: 18 MG/DL (ref 9–23)
CALCIUM BLD-MCNC: 10.3 MG/DL (ref 8.7–10.6)
CHLORIDE SERPL-SCNC: 103 MMOL/L (ref 98–112)
CHOLEST SERPL-MCNC: 177 MG/DL (ref ?–200)
CO2 SERPL-SCNC: 30 MMOL/L (ref 21–32)
CREAT BLD-MCNC: 1.27 MG/DL (ref 0.7–1.3)
EGFRCR SERPLBLD CKD-EPI 2021: 64 ML/MIN/1.73M2 (ref 60–?)
FASTING STATUS PATIENT QL REPORTED: YES
GLOBULIN PLAS-MCNC: 3.1 G/DL (ref 2–3.5)
GLUCOSE BLD-MCNC: 115 MG/DL (ref 70–99)
HDLC SERPL-MCNC: 30 MG/DL (ref 40–59)
LDLC SERPL CALC-MCNC: 54 MG/DL (ref ?–100)
NONHDLC SERPL-MCNC: 147 MG/DL (ref ?–130)
OSMOLALITY SERPL CALC.SUM OF ELEC: 293 MOSM/KG (ref 275–295)
POTASSIUM SERPL-SCNC: 4.1 MMOL/L (ref 3.5–5.1)
PROT SERPL-MCNC: 7.9 G/DL (ref 5.7–8.2)
SODIUM SERPL-SCNC: 140 MMOL/L (ref 136–145)
T4 FREE SERPL-MCNC: 1.4 NG/DL (ref 0.8–1.7)
TRIGL SERPL-MCNC: 630 MG/DL (ref 30–149)
TSI SER-ACNC: 3.43 UIU/ML (ref 0.55–4.78)
URATE SERPL-MCNC: 9.3 MG/DL (ref 3.7–9.2)
VLDLC SERPL CALC-MCNC: 92 MG/DL (ref 0–30)

## 2025-08-04 PROCEDURE — 84550 ASSAY OF BLOOD/URIC ACID: CPT

## 2025-08-04 PROCEDURE — 36415 COLL VENOUS BLD VENIPUNCTURE: CPT

## 2025-08-04 PROCEDURE — 80053 COMPREHEN METABOLIC PANEL: CPT

## 2025-08-04 PROCEDURE — 80061 LIPID PANEL: CPT

## 2025-08-04 PROCEDURE — 84443 ASSAY THYROID STIM HORMONE: CPT

## 2025-08-04 PROCEDURE — 84439 ASSAY OF FREE THYROXINE: CPT

## 2025-08-04 RX ORDER — FENOFIBRATE 160 MG/1
160 TABLET ORAL DAILY
Qty: 90 TABLET | Refills: 2 | Status: SHIPPED | OUTPATIENT
Start: 2025-08-04

## 2025-08-04 RX ORDER — ALLOPURINOL 100 MG/1
200 TABLET ORAL DAILY
Qty: 180 TABLET | Refills: 1 | Status: SHIPPED | OUTPATIENT
Start: 2025-08-04

## 2025-08-04 RX ORDER — LEVOTHYROXINE SODIUM 125 UG/1
125 TABLET ORAL
Qty: 90 TABLET | Refills: 3 | Status: SHIPPED | OUTPATIENT
Start: 2025-08-04

## 2025-08-04 RX ORDER — BUDESONIDE AND FORMOTEROL FUMARATE DIHYDRATE 160; 4.5 UG/1; UG/1
2 AEROSOL RESPIRATORY (INHALATION) 2 TIMES DAILY
Qty: 3 EACH | Refills: 1 | Status: SHIPPED | OUTPATIENT
Start: 2025-08-04

## 2025-08-04 RX ORDER — ALBUTEROL SULFATE 90 UG/1
2 INHALANT RESPIRATORY (INHALATION) EVERY 6 HOURS PRN
Qty: 1 EACH | Refills: 0 | Status: SHIPPED | OUTPATIENT
Start: 2025-08-04

## 2025-08-04 RX ORDER — LOSARTAN POTASSIUM 50 MG/1
50 TABLET ORAL DAILY
Qty: 90 TABLET | Refills: 1 | Status: SHIPPED | OUTPATIENT
Start: 2025-08-04

## 2025-08-28 DIAGNOSIS — G60.9 IDIOPATHIC PERIPHERAL NEUROPATHY: ICD-10-CM

## 2025-08-28 DIAGNOSIS — Z79.891 OPIOID USE AGREEMENT EXISTS: ICD-10-CM

## 2025-08-29 RX ORDER — HYDROCODONE BITARTRATE AND ACETAMINOPHEN 10; 325 MG/1; MG/1
1 TABLET ORAL EVERY 4 HOURS PRN
Qty: 180 TABLET | Refills: 0 | Status: SHIPPED | OUTPATIENT
Start: 2025-08-29

## (undated) DIAGNOSIS — M79.673 CHRONIC FOOT PAIN, UNSPECIFIED LATERALITY: ICD-10-CM

## (undated) DIAGNOSIS — R10.9 CHRONIC ABDOMINAL PAIN: ICD-10-CM

## (undated) DIAGNOSIS — G89.29 CHRONIC ABDOMINAL PAIN: ICD-10-CM

## (undated) DIAGNOSIS — E03.9 HYPOTHYROIDISM, UNSPECIFIED TYPE: ICD-10-CM

## (undated) DIAGNOSIS — G89.29 CHRONIC FOOT PAIN, UNSPECIFIED LATERALITY: ICD-10-CM

## (undated) DEVICE — 3M(TM) MICROPORE TAPE DISPENSER 1535-2: Brand: 3M™ MICROPORE™

## (undated) DEVICE — GAUZE SPONGES,USP TYPE VII GAUZE, 12 PLY: Brand: CURITY

## (undated) DEVICE — ECHELON FLEX POWERED PLUS ARTICULATING ENDOSCOPIC LINEAR CUTTER , 60MM: Brand: ECHELON FLEX

## (undated) DEVICE — GLOVE SURG NEOLON SZ 7-1/2

## (undated) DEVICE — SOL  .9 1000ML BTL

## (undated) DEVICE — UNDYED BRAIDED (POLYGLACTIN 910), SYNTHETIC ABSORBABLE SUTURE: Brand: COATED VICRYL

## (undated) DEVICE — 5 MM BABCOCKS WITH RATCHET HANDLES: Brand: ENDOPATH

## (undated) DEVICE — PROCEDURE KIT FOR HT-2000

## (undated) DEVICE — SUTURE PROLENE 4-0 RB-1

## (undated) DEVICE — LAP CHOLE/APPY CDS-LF: Brand: MEDLINE INDUSTRIES, INC.

## (undated) DEVICE — SUTURE VICRYL 0 UR-6

## (undated) DEVICE — TROCAR: Brand: KII FIOS FIRST ENTRY

## (undated) DEVICE — HIPEC PACK: Brand: MEDLINE INDUSTRIES, INC.

## (undated) DEVICE — Device

## (undated) DEVICE — BLADE #24 STRL PERSONNA PLUS

## (undated) DEVICE — SUTURE PROLENE 3-0 SH

## (undated) DEVICE — CHLORAPREP 26ML APPLICATOR

## (undated) DEVICE — ACCESS PLATFORM FOR MINIMALLY INVASIVE SURGERY.: Brand: GELPORT® LAPAROSCOPIC  SYSTEM

## (undated) DEVICE — PROXIMATE RELOADABLE LINEAR STAPLER: Brand: PROXIMATE

## (undated) DEVICE — COVER,MAYO STAND,STERILE: Brand: MEDLINE

## (undated) DEVICE — KENDALL SCD EXPRESS SLEEVES, KNEE LENGTH, MEDIUM: Brand: KENDALL SCD

## (undated) DEVICE — SUTURE PROLENE 2-0 MH

## (undated) DEVICE — BREAST-HERNIA-PORT CDS-LF: Brand: MEDLINE INDUSTRIES, INC.

## (undated) DEVICE — SUTURE ETHIBOND 2-0 CT-2

## (undated) DEVICE — BETADINE SOLUTION 8 OZ BTL

## (undated) DEVICE — ENDOPATH ULTRA VERESS INSUFFLATION NEEDLES WITH LUER LOCK CONNECTORS: Brand: ENDOPATH

## (undated) DEVICE — SUTURE PDS II 1 TP-1

## (undated) DEVICE — LIGAMAX 5 MM ENDOSCOPIC MULTIPLE CLIP APPLIER: Brand: LIGAMAX

## (undated) DEVICE — CATH RED RUBBER 18FR

## (undated) DEVICE — LIGHT HANDLE

## (undated) DEVICE — TROCAR: Brand: KII SHIELDED BLADED ACCESS SYSTEM

## (undated) DEVICE — SUTURE VICRYL 3-0

## (undated) DEVICE — BANDAID COVERLET 1X3

## (undated) DEVICE — GLOVE SURG NEOLON SZ 7

## (undated) DEVICE — REM POLYHESIVE ADULT PATIENT RETURN ELECTRODE: Brand: VALLEYLAB

## (undated) DEVICE — BASIC DOUBLE BASIN 1-LF: Brand: MEDLINE INDUSTRIES, INC.

## (undated) DEVICE — DRESSING AQUACEL AG 3.5X3.75

## (undated) DEVICE — PROXIMATE RH ROTATING HEAD SKIN STAPLERS (35 WIDE) CONTAINS 35 STAINLESS STEEL STAPLES: Brand: PROXIMATE

## (undated) DEVICE — ENDOPATH 5MM ENDOSCOPIC BLUNT TIP DISSECTORS (12 POUCHES CONTAINING 3 DISSECTORS EACH): Brand: ENDOPATH

## (undated) DEVICE — POOLE SUCTION HANDLE: Brand: CARDINAL HEALTH

## (undated) DEVICE — DRESSING AQUACEL AG 3.5 X 6

## (undated) DEVICE — DRAIN HIPEC LAPAROSCOPIC LASSO

## (undated) DEVICE — MINI LAP PACK-LF: Brand: MEDLINE INDUSTRIES, INC.

## (undated) DEVICE — 3M(TM) TEGADERM(TM) TRANSPARENT FILM DRESSING FRAME STYLE 9505W: Brand: 3M™ TEGADERM™

## (undated) DEVICE — [HIGH FLOW INSUFFLATOR,  DO NOT USE IF PACKAGE IS DAMAGED,  KEEP DRY,  KEEP AWAY FROM SUNLIGHT,  PROTECT FROM HEAT AND RADIOACTIVE SOURCES.]: Brand: PNEUMOSURE

## (undated) DEVICE — CAUTERY PENCIL

## (undated) DEVICE — 3M™ TEGADERM™ TRANSPARENT FILM DRESSING, 1626W, 4 IN X 4-3/4 IN (10 CM X 12 CM), 50 EACH/CARTON, 4 CARTON/CASE: Brand: 3M™ TEGADERM™

## (undated) DEVICE — LAPAROTOMY CDS: Brand: MEDLINE INDUSTRIES, INC.

## (undated) DEVICE — SUTURE ETHILON 3-0 PS-2

## (undated) DEVICE — HEX-LOCKING BLADE ELECTRODE: Brand: EDGE

## (undated) DEVICE — SCRUB EZ BETADINE 245

## (undated) DEVICE — #15 STERILE STAINLESS BLADE: Brand: STERILE STAINLESS BLADES

## (undated) DEVICE — DISPOSABLE GRASPER CARTRIDGE: Brand: DIRECT DRIVE REPOSABLE GRASPERS

## (undated) DEVICE — PLASTC TOOMEY SYRNG DISP

## (undated) DEVICE — ENDOPATH ECHELON ENDOSCOPIC LINEAR CUTTER RELOADS, WHITE, 60MM: Brand: ECHELON ENDOPATH

## (undated) DEVICE — SUTURE VICRYL 0

## (undated) DEVICE — GOWN,PREVENTION PLUS,XLARGE,STERILE: Brand: MEDLINE

## (undated) DEVICE — DRAPE EQUIPMENT INTRATEMP THER

## (undated) DEVICE — GOWN,SIRUS,FABRIC-REINFORCED,X-LARGE: Brand: MEDLINE

## (undated) DEVICE — SUTURE ETHILON 2-0 FS

## (undated) DEVICE — PAD SACRAL SPAN AID

## (undated) DEVICE — TISSUE RETRIEVAL SYSTEM: Brand: INZII RETRIEVAL SYSTEM

## (undated) DEVICE — SOLUTION ENDOSCOPIC ANTI-FOG NON-TOXIC NON-ABRASIVE 6 CUBIC CENTIMETER WITH RADIOPAQUE ADHESIVE-BACKED SPONGE STERILE NOT MADE WITH NATURAL RUBBER LATEX MEDICHOICE: Brand: MEDICHOICE

## (undated) DEVICE — GOWN,PREVENTION PLUS,LARGE,STERILE: Brand: MEDLINE

## (undated) DEVICE — STRYKER HARMONIC 36CM REPRCSED

## (undated) DEVICE — LOWER EXTREMITY CDS-LF: Brand: MEDLINE INDUSTRIES, INC.

## (undated) DEVICE — CHEMOTHERAPY CONTAINER,SLIDE LID, YELLOW: Brand: SHARPSAFETY

## (undated) DEVICE — DRAPE,TAPE STRIPS,STERILE: Brand: MEDLINE

## (undated) DEVICE — DRAPE HALF 40X58 DYNJP2410

## (undated) DEVICE — 2000CC GUARDIAN II: Brand: GUARDIAN

## (undated) DEVICE — GAUZE PACKING IODOFORM 1/4X5

## (undated) DEVICE — TOWEL OR BLU 16X26 STRL

## (undated) DEVICE — DRAIN RELIAVAC 100CC

## (undated) DEVICE — GAMMEX® NON-LATEX PI TEXTURED SIZE 6.5, STERILE POLYISOPRENE POWDER-FREE SURGICAL GLOVE: Brand: GAMMEX

## (undated) DEVICE — MEDI-VAC NON-CONDUCTIVE SUCTION TUBING: Brand: CARDINAL HEALTH

## (undated) DEVICE — GLOVE SURG NEOLON SZ 8

## (undated) DEVICE — SOL H2O 1000ML BTL

## (undated) DEVICE — THE ECHELON FLEX POWERED PLUS ARTICULATING ENDOSCOPIC LINEAR CUTTERS ARE STERILE, SINGLE PATIENT USE INSTRUMENTS THAT SIMULTANEOUSLYCUT AND STAPLE TISSUE. THERE ARE SIX STAGGERED ROWS OF STAPLES, THREE ON EITHER SIDE OF THE CUT LINE. THE ECHELON FLEX 45 POWERED PLUSINSTRUMENTS HAVE A STAPLE LINE THAT IS APPROXIMATELY 45 MM LONG AND A CUT LINE THAT IS APPROXIMATELY 42 MM LONG. THE SHAFT CAN ROTATE FREELYIN BOTH DIRECTIONS AND AN ARTICULATION MECHANISM ENABLES THE DISTAL PORTION OF THE SHAFT TO PIVOT TO FACILITATE LATERAL ACCESS TO THE OPERATIVESITE.THE INSTRUMENTS ARE PACKAGED WITH A PRIMARY LITHIUM BATTERY PACK THAT MUST BE INSTALLED PRIOR TO USE. THERE ARE SPECIFIC REQUIREMENTS FORDISPOSING OF THE BATTERY PACK. REFER TO THE BATTERY PACK DISPOSAL SECTION.THE INSTRUMENTS ARE PACKAGED WITHOUT A RELOAD AND MUST BE LOADED PRIOR TO USE. A STAPLE RETAINING CAP ON THE RELOAD PROTECTS THE STAPLE LEGPOINTS DURING SHIPPING AND TRANSPORTATION. THE INSTRUMENTS’ LOCK-OUT FEATURE IS DESIGNED TO PREVENT A USED OR IMPROPERLY INSTALLED RELOADFROM BEING REFIRED OR AN INSTRUMENT FROM BEING FIRED WITHOUT A RELOAD.: Brand: ECHELON FLEX

## (undated) DEVICE — DRAPE C-ARM UNIVERSAL

## (undated) DEVICE — TROCAR: Brand: KII® SLEEVE

## (undated) DEVICE — SUTURE VICRYL 3-0 SH

## (undated) DEVICE — ENDOPATH ECHELON ENDOSCOPIC LINEAR CUTTER RELOADS, BLUE, 60MM: Brand: ECHELON ENDOPATH

## (undated) DEVICE — MEDI-VAC SUCTION HANDLE REGULAR CAPACITY: Brand: CARDINAL HEALTH

## (undated) DEVICE — DRAPE SADDLE BAG-DUAL INST

## (undated) DEVICE — DRAIN CHANNEL 19FR BLAKE

## (undated) DEVICE — PLUMEPORT ACTIV LAPAROSCOPIC SMOKE FILTRATION DEVICE: Brand: PLUMEPORT ACTIVE

## (undated) DEVICE — Device: Brand: FABCO

## (undated) DEVICE — FLOTRAC SENSOR (84" / 213CM): Brand: FLOTRAC

## (undated) DEVICE — THE ECHELON, ECHELON ENDOPATH™ AND ECHELON FLEX™ FAMILIES OF ENDOSCOPIC LINEAR CUTTERS AND RELOADS ARE STERILE, SINGLE PATIENT USE INSTRUMENTS THAT SIMULTANEOUSLY CUT AND STAPLE TISSUE. THERE ARE SIX STAGGERED ROWS OF STAPLES, THREE ON EITHER SIDE OF THE CUT LINE. THE 45 MM INSTRUMENTS HAVE A STAPLE LINE THATIS APPROXIMATELY 45 MM LONG AND A CUT LINE THAT IS APPROXIMATELY 42 MM LONG. THE SHAFT CAN ROTATE FREELY IN BOTH DIRECTIONS AND AN ARTICULATION MECHANISM ON ARTICULATING INSTRUMENTS ENABLES BENDING THE DISTAL PORTIONOF THE SHAFT TO FACILITATE LATERAL ACCESS OF THE OPERATIVE SITE.THE INSTRUMENTS ARE SHIPPED WITHOUT A RELOAD AND MUST BE LOADED PRIOR TO USE. A STAPLE RETAINING CAP ON THE RELOAD PROTECTS THE STAPLE LEG POINTS DURING SHIPPING AND TRANSPORTATION. THE INSTRUMENTS’ LOCK-OUT FEATURE IS DESIGNED TO PREVENT A USED RELOAD FROM BEING REFIRED.: Brand: ECHELON ENDOPATH

## (undated) DEVICE — SPONGE STICK WITH PVP-I: Brand: KENDALL

## (undated) DEVICE — LAPAROSCOPIC TROCAR SLEEVE/SINGLE USE: Brand: KII® SLEEVE

## (undated) DEVICE — SPECIMEN TRAP LUKI

## (undated) DEVICE — SUTURE SILK 3-0 SH

## (undated) DEVICE — 2, DISPOSABLE SUCTION/IRRIGATOR WITHOUT DISPOSABLE TIP: Brand: STRYKEFLOW

## (undated) DEVICE — DRAIN BLAKE ROUND 15FR

## (undated) DEVICE — SUTURE SILK 0

## (undated) DEVICE — GLOVE SURG NEOLON SZ 6-1/2

## (undated) DEVICE — SUTURE SILK 0 FSL

## (undated) NOTE — LETTER
Rhoda Falcon [address] [address]  [place], [address] [facility]    Consent for Operation  Date: __________________                                Time: _______________    1.  I authorize the performance upon Zac Cuellar the following operation:  Proced procedure has been videotaped, the surgeon will obtain the original videotape. The hospital will not be responsible for storage or maintenance of this tape.   7. For the purpose of advancing medical education, I consent to the admittance of observers to the STATEMENTS REQUIRING INSERTION OR COMPLETION WERE FILLED IN.     Signature of Patient:   ___________________________    When the patient is a minor or mentally incompetent to give consent:  Signature of person authorized to consent for patient: ____________ supplements, and pills I can buy without a prescription (including street drugs/illegal medications). Failure to inform my anesthesiologist about these medicines may increase my risk of anesthetic complications. iv.  If I am allergic to anything or have ha Anesthesiologist Signature     Date   Time  I have discussed the procedure and information above with the patient (or patient’s representative) and answered their questions. The patient or their representative has agreed to have anesthesia services.     ___

## (undated) NOTE — LETTER
ASTHMA ACTION PLAN for Rohan Decker     : 1965     Date: 24  Doctor:  Emmanuel Bernal MD  Phone for doctor or clinic: [facility], [address] [place] [id number] [phone]      ACT Score: 24    ACT Goal: 20 or greater    Call your provider if you require your rescue/quick reliever medication more than 2-3 times in a 24 hour period.    If you require your rescue inhaler/medication more than 2-3 times weekly, your asthma may not be under proper control and you should seek medical attention.    *Quick Relievers are Xopenex and Albuterol*    You can use the colors of a traffic light to help learn about your asthma medicines.  Year Round       1. Green - Go! % of Personal Best Peak Flow   Use controller medicine.   Breathing is good  No cough or wheeze  Can work and play Medicine How much to take When to take it    Medications       Sympathomimetics Instructions     albuterol 108 (90 Base) MCG/ACT Inhalation Aero Soln Inhale 2 puffs into the lungs every 6 (six) hours as needed for Wheezing.     Budesonide-Formoterol Fumarate 160-4.5 MCG/ACT Inhalation Aerosol Inhale 2 puffs into the lungs 2 (two) times daily.                    2. Yellow - Caution. 50-79% Personal Best Peak Flow  Use reliever medicine to keep an asthma attack from getting bad.   Cough  Quick Relievers  Wheezing  Tight Chest  Wake up at night Medicine How much to take When to take it    If symptoms are not improving in 24-48 hrs, call office for further instructions  Medications       Sympathomimetics Instructions     albuterol 108 (90 Base) MCG/ACT Inhalation Aero Soln Inhale 2 puffs into the lungs every 6 (six) hours as needed for Wheezing.     Budesonide-Formoterol Fumarate 160-4.5 MCG/ACT Inhalation Aerosol Inhale 2 puffs into the lungs 2 (two) times daily.                    3. Red - Stop! Danger! <50% Personal Best Peak Flow  Continue Controller Medications But  ADD:   Medicine not helping  Breathing is hard and fast  Nose opens wide  Can't walk  Ribs show  Can't talk well Medicine How much to take When to take it    If your symptoms do not improve in ONE hour -  go to the emergency room or call 911 immediately! If symptoms improve, call office for appointment immediately.    Albuterol inhaler 2 puffs every 20 minutes for three treatments       Don't forget:  Rinse mouth after using inhaler  Use spacer for inhaler  Remember to get your Flu vaccine every fall!    [x] Asthma Action Plan reviewed with the caregiver and patient, and a copy of the plan was given to the patient/caregiver.   [] Asthma Action Plan reviewed with the caregiver and patient on the phone, and copy mailed to patient/caregiver or sent via [employer].     Signatures:   Provider  Emmanuel Bernal MD Patient  Rohan Decker Caretaker

## (undated) NOTE — MR AVS SNAPSHOT
[address]  [address] [address] [phone]               Thank you for choosing us for your health care visit with Tanya Wallace DO.   We are glad to serve you and happy to provide you with this INHALE 2 PUFFS INTO THE LUNGS TWICE DAILY   Commonly known as:  SYMBICORT           * Fenofibrate 48 MG Tabs   2 TABS DAILY   Commonly known as:  TRICOR           * Fenofibrate 48 MG Tabs   Take 2 tablets (96 mg total) by mouth once daily.    Commonly known office, you can view your past visit information in [employer] by going to Visits < Visit Summaries. [employer] questions? Call [phone] for help. [employer] is NOT to be used for urgent needs. For medical emergencies, dial 911.         Educational Inform

## (undated) NOTE — ED AVS SNAPSHOT
Yuniel Self   MRN: [id number]    Department:  [facility] Emergency Department   Date of Visit:  1/23/2018           Disclosure     Insurance plans vary and the physician(s) referred by the ER may not be covered by your plan.  Please contact tell this physician (or your personal doctor if your instructions are to return to your personal doctor) about any new or lasting problems. The primary care or specialist physician will see patients referred from the [facility] Emergency Department.  Wyatt Ruiz

## (undated) NOTE — LETTER
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     I understand that the treat questions have been answered to my satisfaction. I understand that I can contact my oncologist,  or my Cancer Care Team at any time if I have questions, by calling [facility] at [phone].    Additional written information will be given to me

## (undated) NOTE — LETTER
10/02/18    Patient: Lyda Severance  : 1965 Visit date: 10/2/2018    Dear  Dr. Yolie Ramos, DO,    Thank you for referring Lyda Severance to my practice. Please find my assessment and plan below.                Assessment   Appendicitis, unspe

## (undated) NOTE — LETTER
[facility]  José Downing 59 [address] [facility], [address] [place] [address]    Consent for Operation    Date: __________________    Time: _______________    1.  I authorize the performance upon Archana Jacobs the following operation:    Procedure(s):  LAPAROSCOPIC APPE procedure has been videotaped, the surgeon will obtain the original videotape. The hospital will not be responsible for storage or maintenance of this tape.     6. For the purpose of advancing medical education, I consent to the admittance of observers to t STATEMENTS REQUIRING INSERTION OR COMPLETION WERE FILLED IN.     Signature of Patient:   ___________________________    When the patient is a minor or mentally incompetent to give consent:  Signature of person authorized to consent for patient: ____________ supplements, and pills I can buy without a prescription (including street drugs/illegal medications). Failure to inform my anesthesiologist about these medicines may increase my risk of anesthetic complications.   · If I am allergic to anything or have had Anesthesiologist Signature     Date   Time  I have discussed the procedure and information above with the patient (or patient’s representative) and answered their questions. The patient or their representative has agreed to have anesthesia services.     ___

## (undated) NOTE — LETTER
10/2/2018    Name: Luan Umanzor        : 1965    To Whom It May Concern,    Luan Umanzor is under my care since 18 and is scheduled for an upcoming procedure on 18.     Comments:    If there are any further questions regarding

## (undated) NOTE — MR AVS SNAPSHOT
[facility]  [address]  [phone]                    After Visit Summary   11/25/2019    Louisa Parish    MRN: [id number]           Visit Information     Date & Time  11/25/2019  1:00 daily.    hydrochlorothiazide 12.5 MG Oral Tab Take 1 tablet (12.5 mg total) by mouth daily.     PRAVASTATIN SODIUM 20 MG Oral Tab TAKE 1 TABLET(20 MG) BY MOUTH EVERY NIGHT    LORazepam 0.5 MG Oral Tab Take 1 tablet (0.5 mg total) by mouth 2 (two) times deanna Summaries. If you've been to the Emergency Department or your doctor's office, you can view your past visit information in [employer] by going to Visits < Visit Summaries. [employer] questions? Call [phone] for help.   [employer] is NOT to be used for urge

## (undated) NOTE — MR AVS SNAPSHOT
[address]  [address] [facility], [address] [address] [phone]               Thank you for choosing us for your health care visit with Peyton Keller DO.   We are glad to serve you and happy to provide you with this Take 2 tablets (96 mg total) by mouth once daily. Commonly known as:  TRICOR           * Fenofibrate 160 MG Tabs   Take 1 tablet (160 mg total) by mouth daily.            Levothyroxine Sodium 100 MCG Tabs   Take 1 tablet (100 mcg total) by mouth before br

## (undated) NOTE — LETTER
PATIENT AGREEMENT: Opioid Treatment Agreement   PATIENT NAME:  ELIU RIVERS      MRN:  [id number]  PHYSICIAN/SITE:   Emmanuel Bernal MD / [facility], [address] [place] [address]    I understand that this agreement between myself, ____________________________________,  and my physician/medical group is intended to clarify the manner in which chronic (long- term) opioids will be used to manage my chronic pain.   I understand that there are side effects to this therapy; these include, but are not limited to, allergic reactions, depression, sedation, decreased mental ability, itching, difficulty in urinating, nausea and vomiting, loss of energy, decreased balance and falling, constipation, decreased sexual desire and function, potential for overdose and death.  I understand that I must be very cautious with regard to driving or operating heavy machinery while taking these medications, and that I cannot drive, use machinery, or do any activity requiring mental alertness if the medications are slowing my mental or physical functioning.  When opioids are used long-term, some particular concerns include the development of physical dependence and addiction. I understand these risks and have had all of my questions answered by my physician.   I understand that my physician/medical group will prescribe opioids only if I adhere to the following rules:   All opioid prescriptions should be obtained from my primary care physician. If a new condition develops, such as trauma or surgery, then the physician caring for that condition may prescribe opioids for the increase in pain that may be expected. I will notify my primary care physician within 48 hours of my receiving a narcotic prescription from any other physician or other licensed medical provider. For females only: If I become pregnant while taking this medicine, I will immediately inform my obstetrician and obtain counseling on risks to the baby.   I will  submit urine and/or blood upon request by my physician or medical provider for testing at any time without prior notification to detect the use of non-prescribed drugs and medications, and confirm the use of prescribed ones. I will submit to pill counts without notice as per my physician’s request. I will pay any portion of the costs associated with urine and blood testing that is not covered by my insurance.   If I have a history of alcohol or drug misuse/addiction, I must notify my physician of such history and I understand that treatment with opioids for pain may increase the possibility of relapse.  All requests for refills must be made by contacting my primary care physician or specialist during business hours at least 3 business days in advance of the anticipated need for the refill. All prescriptions must be filled by a pharmacy in the [facility], which is authorized to release a record of my medications to this office upon request.  Initial each page of this agreement _________      Refill prescriptions will be written for a one month supply and increased to a maximum of two months at my physician’s discretion.  The daily dose prescribed to me by my physician may not be changed without my physician’s consent. This includes either increasing or decreasing the daily dose.   Prescription refills will not be given prior to the planned refill date determined by the dose and quantity prescribed. I will accept generic medications in place of name brand medications.   Accidental destruction and/or other explanations made for loss of medications or prescriptions is not a valid reason to refill medications or rewrite prescriptions early. I will keep my medications in a safe and secure place, such as a locked cabinet or safe, and I will safeguard my opioid medications from use by family members, children or other unauthorized persons.   I will inform my physician of all medications that I am currently taking;  including prescription, non-prescription, herbal remedies and supplements.  I understand that I may be referred to a specialist as determined by my primary care physician to evaluate my physical condition.   I understand that I may be asked to have an evaluation by either a psychiatrist or psychologist to help manage my medication needs.   If my physician determines that I am not a good candidate to continue with the medication, I may be referred to a detoxification program or evaluation by a pain management center.   I understand that my treatment with these medications may be discontinued or adjusted at any time in my physician’s discretion.   I understand that it is my physician’s policy that all appointments must be kept or cancelled at least 2 business days in advance. I understand that the original bottle of each prescribed opioid medication must be brought to every visit.   I acknowledge that I read the above conditions or the conditions have been read to me.  I further understand that I am responsible for complying with the terms of this agreement and that failure to do so will very likely result in my discharge as a patient from the medical group of my primary care physician. Grounds for dismissal include, but are not limited to: evidence of recreational drug use, drug diversion, altering scripts (this may result in criminal prosecution), obtaining controlled substance prescriptions from other doctors without notifying this office, abusive language toward staff, development of progressive tolerance, use of alcohol or intoxicants, engagement in criminal activities, etc.     ______________________________    ____________    ________________________            Patient Signature                                  Date                  Patient Printed Name  ______________________________    ____________    ________________________            Witness Signature                                Date                   Witness Printed Name

## (undated) NOTE — LETTER
18    Patient: Christian Ang  : 1965 Visit date: 2018    Dear  Dr. Asya Garcia, DO,    Thank you for referring Christian Ang to my practice. Please find my assessment and plan below.                 History of Present Illness   5 Final treatment recommendations will be pending staging workup  He will also need to see medical oncology for recommendations regarding adjuvant therapy  Litzy Marquis and  his wife have no further questions at this time and will proceed as discussed    Assessment

## (undated) NOTE — Clinical Note
05/11/2017        Ora Wan  [address]      Dear Humphrey Crowe records indicate that you have outstanding lab work and or testing that was ordered for you and has not yet been completed:      Comp Metabolic Panel

## (undated) NOTE — LETTER
ASTHMA ACTION PLAN for Emilia Marion     : 1965     Date: 2018  Provider:  Dale Dias DO  Phone for doctor or clinic: [facility] [address] [place] [address]  [phone]

## (undated) NOTE — MR AVS SNAPSHOT
[address]  [address] [id number] [phone]               Thank you for choosing us for your health care visit with Dale Dias DO.   We are glad to serve you and happy to provide you with this and this prescription was added. Make sure you understand how and when to take each.            Levothyroxine Sodium 25 MCG Tabs   Take 1 tab each morning for 2 weeks, tehn increase to 2 daily for the next 2 weeeks     Then increase to 4 tabs   100 mcg  deanna You can access your MyChart to more actively manage your health care and view more details from this visit by going to [email or web address].   If you've recently had a stay at the Hospital you can access your discharge instructions in [address] by [email or web address]

## (undated) NOTE — LETTER
[facility]  José [address] 59 [address] [facility], [address] [place] [address]    Consent for Operation    Date: __________________    Time: _______________    1.  I authorize the performance upon Ailyn Briseno the following operation:    Procedure(s):  LAPAROSCOPIC APPE procedure has been videotaped, the surgeon will obtain the original videotape. The hospital will not be responsible for storage or maintenance of this tape.     6. For the purpose of advancing medical education, I consent to the admittance of observers to t STATEMENTS REQUIRING INSERTION OR COMPLETION WERE FILLED IN.     Signature of Patient:   ___________________________    When the patient is a minor or mentally incompetent to give consent:  Signature of person authorized to consent for patient: ____________ supplements, and pills I can buy without a prescription (including street drugs/illegal medications). Failure to inform my anesthesiologist about these medicines may increase my risk of anesthetic complications.   · If I am allergic to anything or have had Anesthesiologist Signature     Date   Time  I have discussed the procedure and information above with the patient (or patient’s representative) and answered their questions. The patient or their representative has agreed to have anesthesia services.     ___